# Patient Record
Sex: MALE | Race: WHITE | NOT HISPANIC OR LATINO | Employment: FULL TIME | URBAN - METROPOLITAN AREA
[De-identification: names, ages, dates, MRNs, and addresses within clinical notes are randomized per-mention and may not be internally consistent; named-entity substitution may affect disease eponyms.]

---

## 2018-09-12 ENCOUNTER — HOSPITAL ENCOUNTER (OUTPATIENT)
Dept: ORTHOPEDIC SURGERY | Facility: CLINIC | Age: 43
Discharge: HOME | End: 2018-09-12
Attending: ORTHOPAEDIC SURGERY | Admitting: ORTHOPAEDIC SURGERY

## 2019-02-12 ENCOUNTER — HOSPITAL ENCOUNTER (OUTPATIENT)
Dept: PREADMISSION TESTING | Facility: HOSPITAL | Age: 44
Discharge: HOME | End: 2019-02-12
Attending: ORTHOPAEDIC SURGERY | Admitting: ORTHOPAEDIC SURGERY

## 2019-02-12 LAB
ANION GAP SERPL CALC-SCNC: 13.4 MMOL/L (ref 10–20)
APTT BLD: 27.4 SEC (ref 24–31)
BACTERIA SPEC AEROBE CULT: NORMAL
BASOPHILS # BLD AUTO: 0 10*3/UL (ref 0–0.2)
BASOPHILS NFR BLD AUTO: 1 % (ref 0–2)
BUN SERPL-MCNC: 7 MG/DL (ref 8–20)
BUN/CREAT SERPL: 8.8 (ref 6.2–20.3)
CALCIUM SERPL-MCNC: 9.3 MG/DL (ref 8.9–10.3)
CHLORIDE SERPL-SCNC: 99 MMOL/L (ref 101–111)
CONV CO2: 27 MMOL/L (ref 22–32)
CREAT UR-MCNC: 0.8 MG/DL (ref 0.7–1.2)
DIFFERENTIAL METHOD BLD: (no result)
EOSINOPHIL # BLD AUTO: 0.1 10*3/UL (ref 0–0.3)
EOSINOPHIL # BLD AUTO: 1 % (ref 0–3)
ERYTHROCYTE [DISTWIDTH] IN BLOOD BY AUTOMATED COUNT: 13.6 % (ref 11.5–14.5)
GLUCOSE SERPL-MCNC: 97 MG/DL (ref 65–99)
HCT VFR BLD AUTO: 49.2 % (ref 40–54)
HGB BLD-MCNC: 16.7 G/DL (ref 14–18)
INR PPP: 1.1
LYMPHOCYTES # BLD AUTO: 1.4 10*3/UL (ref 0.8–4.8)
LYMPHOCYTES NFR BLD AUTO: 22 % (ref 18–42)
Lab: NORMAL
MCH RBC QN AUTO: 28.6 PG (ref 26–32)
MCHC RBC AUTO-ENTMCNC: 33.8 G/DL (ref 32–36)
MCV RBC AUTO: 84.5 FL (ref 80–94)
MICRO REPORT STATUS: NORMAL
MONOCYTES # BLD AUTO: 0.6 10*3/UL (ref 0.1–1.3)
MONOCYTES NFR BLD AUTO: 9 % (ref 2–11)
NEUTROPHILS # BLD AUTO: 4.3 10*3/UL (ref 2.3–8.6)
NEUTROPHILS NFR BLD AUTO: 67 % (ref 50–75)
NRBC BLD AUTO-RTO: 0 /100{WBCS}
NRBC/RBC NFR BLD MANUAL: 0 10*3/UL
PLATELET # BLD AUTO: 246 10*3/UL (ref 150–450)
PMV BLD AUTO: 7.7 FL (ref 7.4–10.4)
POTASSIUM SERPL-SCNC: 4.4 MMOL/L (ref 3.6–5.1)
PROTHROMBIN TIME: 10.9 SEC (ref 9.6–11.7)
RBC # BLD AUTO: 5.83 10*6/UL (ref 4.6–6)
SODIUM SERPL-SCNC: 135 MMOL/L (ref 136–144)
SPECIMEN SOURCE: NORMAL
WBC # BLD AUTO: 6.4 10*3/UL (ref 4.5–11.5)

## 2019-02-22 ENCOUNTER — HOSPITAL ENCOUNTER (OUTPATIENT)
Dept: PREOP | Facility: HOSPITAL | Age: 44
Setting detail: HOSPITAL OUTPATIENT SURGERY
Discharge: HOME | End: 2019-02-22
Attending: ORTHOPAEDIC SURGERY | Admitting: ORTHOPAEDIC SURGERY

## 2019-06-07 ENCOUNTER — HOSPITAL ENCOUNTER (OUTPATIENT)
Dept: MRI IMAGING | Facility: HOSPITAL | Age: 44
Discharge: HOME | End: 2019-06-07
Attending: PSYCHIATRY & NEUROLOGY | Admitting: PSYCHIATRY & NEUROLOGY

## 2019-06-08 ENCOUNTER — HOSPITAL ENCOUNTER (OUTPATIENT)
Dept: OTHER | Facility: HOSPITAL | Age: 44
Setting detail: RECURRING SERIES
Discharge: HOME | End: 2019-06-10
Attending: PSYCHIATRY & NEUROLOGY | Admitting: PSYCHIATRY & NEUROLOGY

## 2019-07-15 ENCOUNTER — TRANSCRIBE ORDERS (OUTPATIENT)
Dept: ADMINISTRATIVE | Facility: HOSPITAL | Age: 44
End: 2019-07-15

## 2019-07-15 DIAGNOSIS — G35 MS (MULTIPLE SCLEROSIS) (HCC): Primary | ICD-10-CM

## 2019-07-23 ENCOUNTER — APPOINTMENT (OUTPATIENT)
Dept: MRI IMAGING | Facility: HOSPITAL | Age: 44
End: 2019-07-23

## 2019-12-16 ENCOUNTER — TRANSCRIBE ORDERS (OUTPATIENT)
Dept: ADMINISTRATIVE | Facility: HOSPITAL | Age: 44
End: 2019-12-16

## 2019-12-16 DIAGNOSIS — G35 MULTIPLE SCLEROSIS (HCC): Primary | ICD-10-CM

## 2019-12-18 ENCOUNTER — HOSPITAL ENCOUNTER (OUTPATIENT)
Dept: MRI IMAGING | Facility: HOSPITAL | Age: 44
Discharge: HOME | End: 2019-12-18
Admitting: NURSE PRACTITIONER

## 2019-12-18 DIAGNOSIS — G35 MULTIPLE SCLEROSIS (HCC): ICD-10-CM

## 2019-12-18 LAB — CREAT BLDA-MCNC: 0.9 MG/DL (ref 0.6–1.3)

## 2019-12-18 PROCEDURE — 72156 MRI NECK SPINE W/O & W/DYE: CPT

## 2019-12-18 PROCEDURE — 82565 ASSAY OF CREATININE: CPT

## 2019-12-18 PROCEDURE — 25010000002 GADOTERIDOL PER 1 ML: Performed by: NURSE PRACTITIONER

## 2019-12-18 PROCEDURE — A9576 INJ PROHANCE MULTIPACK: HCPCS | Performed by: NURSE PRACTITIONER

## 2019-12-18 RX ADMIN — GADOTERIDOL 20 ML: 279.3 INJECTION, SOLUTION INTRAVENOUS at 16:11

## 2019-12-19 ENCOUNTER — TRANSCRIBE ORDERS (OUTPATIENT)
Dept: ADMINISTRATIVE | Facility: HOSPITAL | Age: 44
End: 2019-12-19

## 2019-12-19 DIAGNOSIS — G35 MULTIPLE SCLEROSIS (HCC): Primary | ICD-10-CM

## 2020-01-06 ENCOUNTER — TRANSCRIBE ORDERS (OUTPATIENT)
Dept: ADMINISTRATIVE | Facility: HOSPITAL | Age: 45
End: 2020-01-06

## 2020-01-06 DIAGNOSIS — Q61.3 POLYCYSTIC KIDNEY DISEASE: Primary | ICD-10-CM

## 2020-01-10 ENCOUNTER — OFFICE (OUTPATIENT)
Dept: URBAN - METROPOLITAN AREA CLINIC 64 | Facility: CLINIC | Age: 45
End: 2020-01-10
Payer: COMMERCIAL

## 2020-01-10 VITALS
HEIGHT: 71 IN | HEART RATE: 84 BPM | DIASTOLIC BLOOD PRESSURE: 83 MMHG | SYSTOLIC BLOOD PRESSURE: 137 MMHG | WEIGHT: 223 LBS

## 2020-01-10 DIAGNOSIS — R10.32 LEFT LOWER QUADRANT PAIN: ICD-10-CM

## 2020-01-10 DIAGNOSIS — G35 MULTIPLE SCLEROSIS: ICD-10-CM

## 2020-01-10 DIAGNOSIS — N53.12 PAINFUL EJACULATION: ICD-10-CM

## 2020-01-10 PROCEDURE — 99202 OFFICE O/P NEW SF 15 MIN: CPT | Performed by: NURSE PRACTITIONER

## 2020-01-13 ENCOUNTER — APPOINTMENT (OUTPATIENT)
Dept: ULTRASOUND IMAGING | Facility: HOSPITAL | Age: 45
End: 2020-01-13

## 2020-03-09 ENCOUNTER — RESULTS ENCOUNTER (OUTPATIENT)
Dept: PAIN MEDICINE | Facility: CLINIC | Age: 45
End: 2020-03-09

## 2020-03-09 ENCOUNTER — OFFICE VISIT (OUTPATIENT)
Dept: PAIN MEDICINE | Facility: CLINIC | Age: 45
End: 2020-03-09

## 2020-03-09 VITALS
DIASTOLIC BLOOD PRESSURE: 87 MMHG | HEART RATE: 71 BPM | HEIGHT: 71 IN | SYSTOLIC BLOOD PRESSURE: 137 MMHG | RESPIRATION RATE: 16 BRPM | OXYGEN SATURATION: 99 % | TEMPERATURE: 97.6 F | WEIGHT: 200 LBS | BODY MASS INDEX: 28 KG/M2

## 2020-03-09 DIAGNOSIS — G89.4 CHRONIC PAIN SYNDROME: ICD-10-CM

## 2020-03-09 DIAGNOSIS — G35 MULTIPLE SCLEROSIS (HCC): ICD-10-CM

## 2020-03-09 DIAGNOSIS — F19.90 CURRENT DRUG USE: Primary | ICD-10-CM

## 2020-03-09 DIAGNOSIS — F19.90 CURRENT DRUG USE: ICD-10-CM

## 2020-03-09 DIAGNOSIS — Z79.899 HIGH RISK MEDICATION USE: ICD-10-CM

## 2020-03-09 DIAGNOSIS — M79.2 NEUROPATHIC PAIN: ICD-10-CM

## 2020-03-09 DIAGNOSIS — M79.18 MYOFASCIAL PAIN SYNDROME: ICD-10-CM

## 2020-03-09 PROCEDURE — G0463 HOSPITAL OUTPT CLINIC VISIT: HCPCS | Performed by: ANESTHESIOLOGY

## 2020-03-09 PROCEDURE — 99204 OFFICE O/P NEW MOD 45 MIN: CPT | Performed by: ANESTHESIOLOGY

## 2020-03-09 RX ORDER — HYDROCODONE BITARTRATE AND ACETAMINOPHEN 7.5; 325 MG/1; MG/1
1 TABLET ORAL 2 TIMES DAILY PRN
Qty: 14 TABLET | Refills: 0 | Status: SHIPPED | OUTPATIENT
Start: 2020-03-09 | End: 2020-03-09 | Stop reason: SDUPTHER

## 2020-03-09 RX ORDER — PREGABALIN 75 MG/1
75 CAPSULE ORAL DAILY
COMMUNITY
End: 2021-06-17

## 2020-03-09 RX ORDER — HYDROCODONE BITARTRATE AND ACETAMINOPHEN 7.5; 325 MG/1; MG/1
1 TABLET ORAL 2 TIMES DAILY PRN
Qty: 46 TABLET | Refills: 0 | Status: SHIPPED | OUTPATIENT
Start: 2020-03-09 | End: 2020-04-08 | Stop reason: SDUPTHER

## 2020-03-09 RX ORDER — HYDROCODONE BITARTRATE AND ACETAMINOPHEN 7.5; 325 MG/1; MG/1
1 TABLET ORAL EVERY 8 HOURS PRN
COMMUNITY
End: 2020-03-09 | Stop reason: SDUPTHER

## 2020-03-09 RX ORDER — LISINOPRIL AND HYDROCHLOROTHIAZIDE 12.5; 1 MG/1; MG/1
1 TABLET ORAL DAILY
COMMUNITY
Start: 2020-01-17

## 2020-03-09 RX ORDER — GLATIRAMER 40 MG/ML
40 INJECTION, SOLUTION SUBCUTANEOUS 3 TIMES WEEKLY
COMMUNITY
Start: 2019-06-24 | End: 2021-06-17

## 2020-03-09 NOTE — PROGRESS NOTES
Subjective    CC bilateral feet and leg pain.  Andrea Dumont is a 44 y.o. male with multiple sclerosis, peripheral neuropathy here for evaluation.  Referred by PCP  Complains of continued and worsening bilateral feet and lower leg neuropathic pain and myofascial pain constant but worse with activity.  Denies new weakness saddle anesthesia bladder bowel continence.  Chronic axial back pain without radicular pain.  Chronic polyarthralgia.  Sees neurology for MS  Pain interfering with daily activity work and sleep.  Tried gabapentin did not tolerate.  Currently utilizes Lyrica but can only tolerate once a day 75 mg.  Prescriber PCP.  Also utilizes hydrocodone as needed with good relief and functional benefit.  Cannot take NSAIDs due to polycystic kidney disease    T-spine MRI 2019 suspected demyelinating plaque centered at about the T8-9 level.  No other definite demyelinating plaques are seen.  L-spine and C-spine MRI 2019 no cord abnormality, minimal degenerative disc disease without canal stenosis or significant foraminal narrowing.    Pain Assessment   Location of Pain:  ana Leg, joint, back  Description of Pain: Dull/Aching, Throbbing, Stabbing  Previous Pain Rating :4   Current Pain Ratin  Aggravating Factors: Activity  Alleviating Factors: Rest, Medication    PEG Assessment   What number best describes your pain on average in the past week?4  What number best describes how, during the past week, pain has interfered with your enjoyment of life?4  What number best describes how, during the past week, pain has interfered with your general activity?10      The following portions of the patient's history were reviewed and updated as appropriate: allergies, current medications, past family history, past medical history, past social history, past surgical history and problem list.     has a past medical history of Arthritis, High blood pressure, Knee pain, left, Leg pain, Multiple sclerosis (CMS/HCC), Pain in  both feet, and Polycystic kidney disease.   has a past surgical history that includes Vasectomy; Cyst Removal; and Shoulder surgery (Left, 02/22/2018).  family history includes Cancer in an other family member; Diabetes in an other family member; Hyperlipidemia in his father; Transient ischemic attack in his father.  Social History     Tobacco Use   • Smoking status: Former Smoker     Last attempt to quit: 2010     Years since quitting: 10.1   • Smokeless tobacco: Never Used   Substance Use Topics   • Alcohol use: Yes     Frequency: Never     Comment: occ       Review of Systems   Constitutional: Negative for chills, diaphoresis, fatigue and fever.   HENT: Negative for swollen glands and trouble swallowing.    Respiratory: Negative.  Negative for shortness of breath.    Cardiovascular: Negative for chest pain, palpitations and leg swelling.   Gastrointestinal: Negative for abdominal pain, nausea and vomiting.   Genitourinary: Negative for urinary incontinence.   Musculoskeletal: Positive for back pain and neck pain. Negative for arthralgias, gait problem, joint swelling, myalgias and neck stiffness.        Ronan leg pain   Skin: Negative for color change, dry skin, rash and skin lesions.   Neurological: Positive for weakness. Negative for dizziness, syncope, light-headedness, headache and confusion.   Hematological: Negative for adenopathy. Does not bruise/bleed easily.   Psychiatric/Behavioral: Negative for behavioral problems, suicidal ideas and depressed mood.   All other systems reviewed and are negative.    Objective   Physical Exam   Constitutional: He is oriented to person, place, and time. He appears well-developed and well-nourished. No distress.   Eyes: Pupils are equal, round, and reactive to light. Conjunctivae are normal.   Neck: Normal range of motion and full passive range of motion without pain. No Kernig's sign noted.   Cardiovascular: Normal heart sounds and intact distal pulses.   Pulmonary/Chest:  "Effort normal and breath sounds normal.   Musculoskeletal:        Lumbar back: He exhibits tenderness.   Back: Paraspinal tenderness, negative leg raise.  Pain with extension/side rotation.   Lumbar loading positive, pain on extension of low back past 5 degrees.  TTP on the lumbar facets noted     Vascular Status -  His right foot exhibits no edema. His left foot exhibits no edema.  Lymphadenopathy:     He has no cervical adenopathy.   Neurological: He is alert and oriented to person, place, and time. He has normal strength and normal reflexes. A sensory deficit is present. Coordination and gait normal.   Reflex Scores:       Bicep reflexes are 2+ on the right side and 2+ on the left side.       Brachioradialis reflexes are 2+ on the right side and 2+ on the left side.       Patellar reflexes are 2+ on the right side and 2+ on the left side.       Achilles reflexes are 2+ on the right side and 2+ on the left side.  Skin: Skin is warm and dry. Capillary refill takes less than 2 seconds.   Psychiatric: He has a normal mood and affect. His behavior is normal.   Vitals reviewed.    /87   Pulse 71   Temp 97.6 °F (36.4 °C)   Resp 16   Ht 180.3 cm (71\")   Wt 90.7 kg (200 lb)   SpO2 99%   BMI 27.89 kg/m²     PHQ 9 on chart  Opioid risk tool moderate risk (age)    Assessment/Plan   Andrea was seen today for foot pain, leg pain and initial evaluation.    Diagnoses and all orders for this visit:    Current drug use  -     Urine Drug Screen - Urine, Clean Catch; Future    Chronic pain syndrome  -     Discontinue: HYDROcodone-acetaminophen (NORCO) 7.5-325 MG per tablet; Take 1 tablet by mouth 2 (Two) Times a Day As Needed for Severe Pain  (takes prn).  -     HYDROcodone-acetaminophen (NORCO) 7.5-325 MG per tablet; Take 1 tablet by mouth 2 (Two) Times a Day As Needed for Severe Pain  (takes prn).    Neuropathic pain  -     Discontinue: HYDROcodone-acetaminophen (NORCO) 7.5-325 MG per tablet; Take 1 tablet by " mouth 2 (Two) Times a Day As Needed for Severe Pain  (takes prn).  -     HYDROcodone-acetaminophen (NORCO) 7.5-325 MG per tablet; Take 1 tablet by mouth 2 (Two) Times a Day As Needed for Severe Pain  (takes prn).    Multiple sclerosis (CMS/HCC)  -     Discontinue: HYDROcodone-acetaminophen (NORCO) 7.5-325 MG per tablet; Take 1 tablet by mouth 2 (Two) Times a Day As Needed for Severe Pain  (takes prn).  -     HYDROcodone-acetaminophen (NORCO) 7.5-325 MG per tablet; Take 1 tablet by mouth 2 (Two) Times a Day As Needed for Severe Pain  (takes prn).    Myofascial pain syndrome    High risk medication use    Summary  Andrea Dumont is a 44 y.o. male with multiple sclerosis, peripheral neuropathy here for evaluation.  Referred by PCP  Continued worsening bilateral lower extremity neuropathic pain/myofascial pain related to MS.  Fitting with ADL and work.  Cannot take NSAIDs due to polycystic kidney disease.  Good relief with hydrocodone as needed and Lyrica once daily.    We will continue hydrocodone 7.5/325 twice daily as needed for severe pain.  UDS sent.  Inspect reviewed.  Controlled substance agreement signed.  Discussed risk of tolerance, dependence, respiratory depression, coma and death associated with use of oral opioids for treatment of chronic nonmalignant pain.     Keep follow-up with neurology.    RTC 1 month

## 2020-04-02 ENCOUNTER — TELEPHONE (OUTPATIENT)
Dept: PAIN MEDICINE | Facility: CLINIC | Age: 45
End: 2020-04-02

## 2020-04-02 NOTE — TELEPHONE ENCOUNTER
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 He will be out of medication if we reschedule after the quarantine

## 2020-04-08 ENCOUNTER — TELEMEDICINE (OUTPATIENT)
Dept: PAIN MEDICINE | Facility: CLINIC | Age: 45
End: 2020-04-08

## 2020-04-08 DIAGNOSIS — G35 MULTIPLE SCLEROSIS (HCC): ICD-10-CM

## 2020-04-08 DIAGNOSIS — M79.18 MYOFASCIAL PAIN SYNDROME: Primary | ICD-10-CM

## 2020-04-08 DIAGNOSIS — Z79.899 HIGH RISK MEDICATION USE: ICD-10-CM

## 2020-04-08 DIAGNOSIS — M79.2 NEUROPATHIC PAIN: ICD-10-CM

## 2020-04-08 DIAGNOSIS — G89.4 CHRONIC PAIN SYNDROME: ICD-10-CM

## 2020-04-08 PROCEDURE — 99214 OFFICE O/P EST MOD 30 MIN: CPT | Performed by: ANESTHESIOLOGY

## 2020-04-08 RX ORDER — HYDROCODONE BITARTRATE AND ACETAMINOPHEN 7.5; 325 MG/1; MG/1
1 TABLET ORAL 2 TIMES DAILY PRN
Qty: 60 TABLET | Refills: 0 | Status: SHIPPED | OUTPATIENT
Start: 2020-04-08 | End: 2020-05-05 | Stop reason: SDUPTHER

## 2020-04-08 NOTE — PROGRESS NOTES
Subjective    CC bilateral feet and leg pain.  Andrea Dumont is a 45 y.o. male with multiple sclerosis, peripheral neuropathy here for follow up via Video to mitigate coronavirus exposure/epidemic.  Continued Hydrocodone last visit and increased to BID with good relief. Chronic bilateral feet and lower leg neuropathic pain and myofascial pain constant but worse with activity.  Denies new weakness saddle anesthesia bladder bowel continence.  Chronic axial back pain without radicular pain.  Chronic polyarthralgia.  Sees neurology for MS  Pain interfering with daily activity work and sleep.  Tried gabapentin did not tolerate.  Currently utilizes Lyrica but can only tolerate once a day 75 mg.  Prescribed by PCP.  Cannot take NSAIDs due to polycystic kidney disease    T-spine MRI 2019 suspected demyelinating plaque centered at about the T8-9 level.  No other definite demyelinating plaques are seen.  L-spine and C-spine MRI 2019 no cord abnormality, minimal degenerative disc disease without canal stenosis or significant foraminal narrowing.    Pain Assessment   Location of Pain:  ana Leg, joint, back  Description of Pain: Dull/Aching, Throbbing, Stabbing  Previous Pain Rating :4   Current Pain Rating: 3  Aggravating Factors: Activity  Alleviating Factors: Rest, Medication    PEG Assessment   What number best describes your pain on average in the past week?5  What number best describes how, during the past week, pain has interfered with your enjoyment of life?4  What number best describes how, during the past week, pain has interfered with your general activity?7     The following portions of the patient's history were reviewed and updated as appropriate: allergies, current medications, past family history, past medical history, past social history, past surgical history and problem list.     has a past medical history of Arthritis, High blood pressure, Knee pain, left, Leg pain, Multiple sclerosis (CMS/HCC), Pain in  both feet, and Polycystic kidney disease.   has a past surgical history that includes Vasectomy; Cyst Removal; and Shoulder surgery (Left, 02/22/2018).  family history includes Cancer in an other family member; Diabetes in an other family member; Hyperlipidemia in his father; Transient ischemic attack in his father.  Social History     Tobacco Use   • Smoking status: Former Smoker     Last attempt to quit: 2010     Years since quitting: 10.2   • Smokeless tobacco: Never Used   Substance Use Topics   • Alcohol use: Yes     Frequency: Never     Comment: occ       Review of Systems   Constitutional: Negative for chills, diaphoresis, fatigue and fever.   HENT: Negative for swollen glands and trouble swallowing.    Respiratory: Negative.  Negative for shortness of breath.    Cardiovascular: Negative for chest pain, palpitations and leg swelling.   Gastrointestinal: Negative for abdominal pain, nausea and vomiting.   Genitourinary: Negative for urinary incontinence.   Musculoskeletal: Positive for back pain and neck pain. Negative for arthralgias, gait problem, joint swelling, myalgias and neck stiffness.        Ronan leg pain   Skin: Negative for color change, dry skin, rash and skin lesions.   Neurological: Positive for weakness. Negative for dizziness, syncope, light-headedness, headache and confusion.   Hematological: Negative for adenopathy. Does not bruise/bleed easily.   Psychiatric/Behavioral: Negative for behavioral problems, suicidal ideas and depressed mood.   All other systems reviewed and are negative.    Objective   Physical Exam   Constitutional: He is oriented to person, place, and time. No distress.   Pulmonary/Chest: Effort normal.   Neurological: He is alert and oriented to person, place, and time.   Psychiatric: He has a normal mood and affect. His behavior is normal.     PHQ 9 on chart  Opioid risk tool moderate risk (age)    Assessment/Plan   Andrea was seen today for neuralgia, generalized body  aches, extremity pain and follow-up.    Diagnoses and all orders for this visit:    Myofascial pain syndrome    Chronic pain syndrome  -     HYDROcodone-acetaminophen (NORCO) 7.5-325 MG per tablet; Take 1 tablet by mouth 2 (Two) Times a Day As Needed for Severe Pain  (takes prn).    Neuropathic pain  -     HYDROcodone-acetaminophen (NORCO) 7.5-325 MG per tablet; Take 1 tablet by mouth 2 (Two) Times a Day As Needed for Severe Pain  (takes prn).    Multiple sclerosis (CMS/HCC)  -     HYDROcodone-acetaminophen (NORCO) 7.5-325 MG per tablet; Take 1 tablet by mouth 2 (Two) Times a Day As Needed for Severe Pain  (takes prn).    High risk medication use    Summary  Andrea Dumont is a 44 y.o. male with multiple sclerosis, peripheral neuropathy here for f/u by Video.   Continued  bilateral lower extremity neuropathic pain/myofascial pain related to MS.  Fitting with ADL and work.  Cannot take NSAIDs due to polycystic kidney disease.    We will continue hydrocodone 7.5/325 twice daily as needed for severe pain.  UDS and Inspect reviewed.   Discussed risk of tolerance, dependence, respiratory depression, coma and death associated with use of oral opioids for treatment of chronic nonmalignant pain.     Keep follow-up with neurology.    Discussed CDC guidelines/precaution regarding coroanvirus.     RTC 1 month

## 2020-05-05 ENCOUNTER — TELEMEDICINE (OUTPATIENT)
Dept: PAIN MEDICINE | Facility: CLINIC | Age: 45
End: 2020-05-05

## 2020-05-05 DIAGNOSIS — G89.4 CHRONIC PAIN SYNDROME: Primary | ICD-10-CM

## 2020-05-05 DIAGNOSIS — M79.18 MYOFASCIAL PAIN SYNDROME: ICD-10-CM

## 2020-05-05 DIAGNOSIS — G35 MULTIPLE SCLEROSIS (HCC): ICD-10-CM

## 2020-05-05 DIAGNOSIS — M79.2 NEUROPATHIC PAIN: ICD-10-CM

## 2020-05-05 PROCEDURE — 99214 OFFICE O/P EST MOD 30 MIN: CPT | Performed by: ANESTHESIOLOGY

## 2020-05-05 RX ORDER — HYDROCODONE BITARTRATE AND ACETAMINOPHEN 7.5; 325 MG/1; MG/1
1 TABLET ORAL 2 TIMES DAILY PRN
Qty: 60 TABLET | Refills: 0 | Status: SHIPPED | OUTPATIENT
Start: 2020-05-05 | End: 2020-06-04 | Stop reason: SDUPTHER

## 2020-05-05 NOTE — PROGRESS NOTES
Subjective    CC bilateral feet and leg pain.  Andrea Dumont is a 45 y.o. male with multiple sclerosis, peripheral neuropathy here for follow up via Video.  Continued chronic bilateral feet and lower leg neuropathic pain and myofascial pain constant but worse with activity.  Denies new weakness saddle anesthesia bladder bowel continence.  Chronic axial back pain without radicular pain.  Chronic polyarthralgia.  Sees neurology for MS  Pain interfering with daily activity work and sleep.  Tried gabapentin did not tolerate.  Currently utilizes Lyrica but can only tolerate once a day 75 mg.  Prescribed by PCP.  Cannot take NSAIDs due to polycystic kidney disease    T-spine MRI 2019 suspected demyelinating plaque centered at about the T8-9 level.  No other definite demyelinating plaques are seen.  L-spine and C-spine MRI 2019 no cord abnormality, minimal degenerative disc disease without canal stenosis or significant foraminal narrowing.    Pain Assessment   Location of Pain:  ana Leg, joint, back  Description of Pain: Dull/Aching, Throbbing, Stabbing  Previous Pain Rating :3   Current Pain Ratin  Aggravating Factors: Activity  Alleviating Factors: Rest, Medication    PEG Assessment   What number best describes your pain on average in the past week?4  What number best describes how, during the past week, pain has interfered with your enjoyment of life?4  What number best describes how, during the past week, pain has interfered with your general activity?7     The following portions of the patient's history were reviewed and updated as appropriate: allergies, current medications, past family history, past medical history, past social history, past surgical history and problem list.     has a past medical history of Arthritis, High blood pressure, Knee pain, left, Leg pain, Multiple sclerosis (CMS/HCC), Pain in both feet, and Polycystic kidney disease.   has a past surgical history that includes Vasectomy; Cyst  Removal; and Shoulder surgery (Left, 02/22/2018).  family history includes Cancer in an other family member; Diabetes in an other family member; Hyperlipidemia in his father; Transient ischemic attack in his father.  Social History     Tobacco Use   • Smoking status: Former Smoker     Last attempt to quit: 2010     Years since quitting: 10.3   • Smokeless tobacco: Never Used   Substance Use Topics   • Alcohol use: Yes     Frequency: Never     Comment: occ       Review of Systems   Constitutional: Negative for chills, diaphoresis, fatigue and fever.   HENT: Negative for swollen glands and trouble swallowing.    Respiratory: Negative.  Negative for shortness of breath.    Cardiovascular: Negative for chest pain, palpitations and leg swelling.   Gastrointestinal: Negative for abdominal pain, nausea and vomiting.   Genitourinary: Negative for urinary incontinence.   Musculoskeletal: Positive for back pain and neck pain. Negative for arthralgias, gait problem, joint swelling, myalgias and neck stiffness.        Ronan leg pain   Skin: Negative for color change, dry skin, rash and skin lesions.   Neurological: Positive for weakness. Negative for dizziness, syncope, light-headedness, headache and confusion.   Hematological: Negative for adenopathy. Does not bruise/bleed easily.   Psychiatric/Behavioral: Negative for behavioral problems, suicidal ideas and depressed mood.   All other systems reviewed and are negative.    Objective   Physical Exam   Constitutional: He is oriented to person, place, and time. No distress.   Pulmonary/Chest: Effort normal.   Neurological: He is alert and oriented to person, place, and time.   Psychiatric: He has a normal mood and affect. His behavior is normal.     PHQ 9 on chart  Opioid risk tool moderate risk (age)    Assessment/Plan   Andrea was seen today for extremity pain, generalized body aches and follow-up.    Diagnoses and all orders for this visit:    Chronic pain syndrome  -      HYDROcodone-acetaminophen (NORCO) 7.5-325 MG per tablet; Take 1 tablet by mouth 2 (Two) Times a Day As Needed for Severe Pain  (takes prn).    Neuropathic pain  -     HYDROcodone-acetaminophen (NORCO) 7.5-325 MG per tablet; Take 1 tablet by mouth 2 (Two) Times a Day As Needed for Severe Pain  (takes prn).    Myofascial pain syndrome    Multiple sclerosis (CMS/HCC)  -     HYDROcodone-acetaminophen (NORCO) 7.5-325 MG per tablet; Take 1 tablet by mouth 2 (Two) Times a Day As Needed for Severe Pain  (takes prn).    Summary  Andrea Dumont is a 45 y.o. male with multiple sclerosis, peripheral neuropathy here for f/u by Video Visit.   Chronic bilateral lower extremity neuropathic pain/myofascial pain related to MS.  Fitting with ADL and work.  Cannot take NSAIDs due to polycystic kidney disease.    We will continue hydrocodone 7.5/325 twice daily as needed for severe pain.  UDS and Inspect reviewed.   Discussed risk of tolerance, dependence, respiratory depression, coma and death associated with use of oral opioids for treatment of chronic nonmalignant pain.     Keep follow-up with neurology.    Videovisit done to avoid coronavirus exposure.  Discussed CDC guidelines and precaution regarding current epidemic.    RTC 1 month

## 2020-06-03 ENCOUNTER — APPOINTMENT (OUTPATIENT)
Dept: PAIN MEDICINE | Facility: CLINIC | Age: 45
End: 2020-06-03

## 2020-06-04 ENCOUNTER — TELEMEDICINE (OUTPATIENT)
Dept: PAIN MEDICINE | Facility: CLINIC | Age: 45
End: 2020-06-04

## 2020-06-04 ENCOUNTER — TELEPHONE (OUTPATIENT)
Dept: PAIN MEDICINE | Facility: HOSPITAL | Age: 45
End: 2020-06-04

## 2020-06-04 DIAGNOSIS — M79.2 NEUROPATHIC PAIN: ICD-10-CM

## 2020-06-04 DIAGNOSIS — G35 MULTIPLE SCLEROSIS (HCC): ICD-10-CM

## 2020-06-04 DIAGNOSIS — Z79.899 HIGH RISK MEDICATION USE: ICD-10-CM

## 2020-06-04 DIAGNOSIS — G89.4 CHRONIC PAIN SYNDROME: Primary | ICD-10-CM

## 2020-06-04 PROCEDURE — 99214 OFFICE O/P EST MOD 30 MIN: CPT | Performed by: ANESTHESIOLOGY

## 2020-06-04 RX ORDER — HYDROCODONE BITARTRATE AND ACETAMINOPHEN 7.5; 325 MG/1; MG/1
1 TABLET ORAL 2 TIMES DAILY PRN
Qty: 60 TABLET | Refills: 0 | Status: SHIPPED | OUTPATIENT
Start: 2020-06-05 | End: 2020-07-31 | Stop reason: SDUPTHER

## 2020-06-04 RX ORDER — HYDROCODONE BITARTRATE AND ACETAMINOPHEN 7.5; 325 MG/1; MG/1
1 TABLET ORAL 2 TIMES DAILY PRN
Qty: 60 TABLET | Refills: 0 | Status: SHIPPED | OUTPATIENT
Start: 2020-07-05 | End: 2020-07-31 | Stop reason: SDUPTHER

## 2020-06-04 NOTE — PROGRESS NOTES
Subjective    CC bilateral feet and leg pain.  Andrea Dumont is a 45 y.o. male with multiple sclerosis, peripheral neuropathy here for follow up via Video.  Reports worsening myofascial pain this week, but he has also increased activity and hours at work.   Chronic bilateral feet and lower leg neuropathic pain and myofascial pain constant but worse with activity.  Denies new weakness saddle anesthesia bladder bowel continence.  Chronic axial back pain without radicular pain.  Chronic polyarthralgia.  Sees neurology for MS  Pain interfering with daily activity work and sleep.  Tried gabapentin did not tolerate.  Currently utilizes Lyrica but can only tolerate once a day 75 mg.  Prescribed by PCP.  Cannot take NSAIDs due to polycystic kidney disease    T-spine MRI 2019 suspected demyelinating plaque centered at about the T8-9 level.  No other definite demyelinating plaques are seen.  L-spine and C-spine MRI 2019 no cord abnormality, minimal degenerative disc disease without canal stenosis or significant foraminal narrowing.    Pain Assessment   Location of Pain:  ana Leg, joint, back  Description of Pain: Dull/Aching, Throbbing, Stabbing  Previous Pain Rating :5   Current Pain Ratin  Aggravating Factors: Activity  Alleviating Factors: Rest, Medication    PEG Assessment   What number best describes your pain on average in the past week?8  What number best describes how, during the past week, pain has interfered with your enjoyment of life?4  What number best describes how, during the past week, pain has interfered with your general activity?8     The following portions of the patient's history were reviewed and updated as appropriate: allergies, current medications, past family history, past medical history, past social history, past surgical history and problem list.     has a past medical history of Arthritis, High blood pressure, Knee pain, left, Leg pain, Multiple sclerosis (CMS/HCC), Pain in both feet,  and Polycystic kidney disease.   has a past surgical history that includes Vasectomy; Cyst Removal; and Shoulder surgery (Left, 02/22/2018).  family history includes Cancer in an other family member; Diabetes in an other family member; Hyperlipidemia in his father; Transient ischemic attack in his father.  Social History     Tobacco Use   • Smoking status: Former Smoker     Last attempt to quit: 2010     Years since quitting: 10.4   • Smokeless tobacco: Never Used   Substance Use Topics   • Alcohol use: Yes     Frequency: Never     Comment: occ       Review of Systems   Constitutional: Negative for chills, diaphoresis, fatigue and fever.   HENT: Negative for swollen glands and trouble swallowing.    Respiratory: Negative.  Negative for shortness of breath.    Cardiovascular: Negative for chest pain, palpitations and leg swelling.   Gastrointestinal: Negative for abdominal pain, nausea and vomiting.   Genitourinary: Negative for urinary incontinence.   Musculoskeletal: Positive for back pain and neck pain. Negative for arthralgias, gait problem, joint swelling, myalgias and neck stiffness.        Ronan leg pain   Skin: Negative for color change, dry skin, rash and skin lesions.   Neurological: Positive for weakness. Negative for dizziness, syncope, light-headedness, headache and confusion.   Hematological: Negative for adenopathy. Does not bruise/bleed easily.   Psychiatric/Behavioral: Negative for behavioral problems, suicidal ideas and depressed mood.   All other systems reviewed and are negative.    Objective   Physical Exam   Constitutional: He is oriented to person, place, and time. No distress.   Pulmonary/Chest: Effort normal.   Neurological: He is alert and oriented to person, place, and time.   Psychiatric: He has a normal mood and affect. His behavior is normal.     PHQ 9 on chart  Opioid risk tool moderate risk (age)    Assessment/Plan    Andrea was seen today for generalized body aches and  follow-up.    Diagnoses and all orders for this visit:    Chronic pain syndrome  -     HYDROcodone-acetaminophen (NORCO) 7.5-325 MG per tablet; Take 1 tablet by mouth 2 (Two) Times a Day As Needed for Severe Pain . DNF before 7/5/2020  -     HYDROcodone-acetaminophen (NORCO) 7.5-325 MG per tablet; Take 1 tablet by mouth 2 (Two) Times a Day As Needed for Severe Pain  (takes prn).    Neuropathic pain  -     HYDROcodone-acetaminophen (NORCO) 7.5-325 MG per tablet; Take 1 tablet by mouth 2 (Two) Times a Day As Needed for Severe Pain  (takes prn).    Multiple sclerosis (CMS/HCC)  -     HYDROcodone-acetaminophen (NORCO) 7.5-325 MG per tablet; Take 1 tablet by mouth 2 (Two) Times a Day As Needed for Severe Pain  (takes prn).    High risk medication use    Summary  Andrea Dumont is a 45 y.o. male with multiple sclerosis, peripheral neuropathy here for f/u by Video Visit.   Chronic bilateral lower extremity neuropathic pain/myofascial pain related to MS.  Fitting with ADL and work.  Cannot take NSAIDs due to polycystic kidney disease.    New UDS tomorrow  Continue hydrocodone 7.5/325 twice daily as needed for severe pain.  UDS and Inspect reviewed.   Discussed risk of tolerance, dependence, respiratory depression, coma and death associated with use of oral opioids for treatment of chronic nonmalignant pain.     Keep follow-up with neurology.    Videovisit done to avoid coronavirus exposure.  Discussed CDC guidelines and precaution regarding current epidemic.    RTC 2  month

## 2020-06-05 ENCOUNTER — LAB (OUTPATIENT)
Dept: LAB | Facility: HOSPITAL | Age: 45
End: 2020-06-05

## 2020-06-05 ENCOUNTER — RESULTS ENCOUNTER (OUTPATIENT)
Dept: PAIN MEDICINE | Facility: HOSPITAL | Age: 45
End: 2020-06-05

## 2020-06-05 DIAGNOSIS — F19.90 CURRENT DRUG USE: Primary | ICD-10-CM

## 2020-06-05 DIAGNOSIS — F19.90 CURRENT DRUG USE: ICD-10-CM

## 2020-06-05 NOTE — TELEPHONE ENCOUNTER
Called patient to let him know he needs to come in for UDS today. He stated he is on the way to work and he cant come in today, he said he can come in Monday morning he need it heads up. I explain to him about the pain management agreement. He said he will call Dr Pemberton later today.

## 2020-06-30 ENCOUNTER — TELEPHONE (OUTPATIENT)
Dept: PAIN MEDICINE | Facility: CLINIC | Age: 45
End: 2020-06-30

## 2020-07-13 ENCOUNTER — LAB (OUTPATIENT)
Dept: LAB | Facility: HOSPITAL | Age: 45
End: 2020-07-13

## 2020-07-13 NOTE — TELEPHONE ENCOUNTER
Had # 38 Norco on pill count filled 07/05/2020 verified per [facility] and JALEEL Mayfield RN. LD of [facility] 07/13/2020 0700.

## 2020-07-31 ENCOUNTER — OFFICE VISIT (OUTPATIENT)
Dept: PAIN MEDICINE | Facility: CLINIC | Age: 45
End: 2020-07-31

## 2020-07-31 DIAGNOSIS — G89.4 CHRONIC PAIN SYNDROME: Primary | ICD-10-CM

## 2020-07-31 DIAGNOSIS — G35 MULTIPLE SCLEROSIS (HCC): ICD-10-CM

## 2020-07-31 DIAGNOSIS — Z79.899 HIGH RISK MEDICATION USE: ICD-10-CM

## 2020-07-31 DIAGNOSIS — M79.2 NEUROPATHIC PAIN: ICD-10-CM

## 2020-07-31 DIAGNOSIS — M79.18 MYOFASCIAL PAIN SYNDROME: ICD-10-CM

## 2020-07-31 PROCEDURE — 99443 PR PHYS/QHP TELEPHONE EVALUATION 21-30 MIN: CPT | Performed by: ANESTHESIOLOGY

## 2020-07-31 RX ORDER — PREGABALIN 75 MG/1
75 CAPSULE ORAL AS NEEDED
COMMUNITY
Start: 2019-12-19 | End: 2021-06-17

## 2020-07-31 RX ORDER — HYDROCODONE BITARTRATE AND ACETAMINOPHEN 7.5; 325 MG/1; MG/1
1 TABLET ORAL 2 TIMES DAILY PRN
Qty: 60 TABLET | Refills: 0 | Status: SHIPPED | OUTPATIENT
Start: 2020-08-03 | End: 2020-10-01 | Stop reason: SDUPTHER

## 2020-07-31 RX ORDER — HYDROCODONE BITARTRATE AND ACETAMINOPHEN 7.5; 325 MG/1; MG/1
1 TABLET ORAL 2 TIMES DAILY PRN
Qty: 60 TABLET | Refills: 0 | Status: SHIPPED | OUTPATIENT
Start: 2020-09-02 | End: 2020-10-01 | Stop reason: SDUPTHER

## 2020-07-31 NOTE — PROGRESS NOTES
Subjective    CC bilateral feet and leg pain.  Andrea Dumont is a 45 y.o. male with multiple sclerosis, peripheral neuropathy here for follow up by tel  Continued chronic bilateral feet and lower leg neuropathic pain and myofascial pain constant but worse with activity.  Denies new weakness saddle anesthesia bladder bowel continence.  Chronic axial back pain without radicular pain.  Chronic polyarthralgia.  Sees neurology for MS  Pain interfering with daily activity work and sleep.  Tried gabapentin did not tolerate.  Currently utilizes Lyrica but can only tolerate once a day 75 mg.  Prescribed by PCP.  Cannot take NSAIDs due to polycystic kidney disease    T-spine MRI 2019 suspected demyelinating plaque centered at about the T8-9 level.  No other definite demyelinating plaques are seen.  L-spine and C-spine MRI 2019 no cord abnormality, minimal degenerative disc disease without canal stenosis or significant foraminal narrowing.    Pain Assessment   Location of Pain:  ana Leg, joint, back  Description of Pain: Dull/Aching, Throbbing, Stabbing  Previous Pain Rating :5   Current Pain Ratin  Aggravating Factors: Activity  Alleviating Factors: Rest, Medication    PEG Assessment   What number best describes your pain on average in the past week?8  What number best describes how, during the past week, pain has interfered with your enjoyment of life?4  What number best describes how, during the past week, pain has interfered with your general activity?5     The following portions of the patient's history were reviewed and updated as appropriate: allergies, current medications, past family history, past medical history, past social history, past surgical history and problem list.     has a past medical history of Arthritis, High blood pressure, Knee pain, left, Leg pain, Multiple sclerosis (CMS/HCC), Pain in both feet, and Polycystic kidney disease.   has a past surgical history that includes Vasectomy; Cyst  Removal; and Shoulder surgery (Left, 02/22/2018).  family history includes Cancer in an other family member; Diabetes in an other family member; Hyperlipidemia in his father; Transient ischemic attack in his father.  Social History     Tobacco Use   • Smoking status: Former Smoker     Last attempt to quit: 2010     Years since quitting: 10.5   • Smokeless tobacco: Never Used   Substance Use Topics   • Alcohol use: Yes     Frequency: Never     Comment: occ       Review of Systems   Constitutional: Negative for chills, diaphoresis, fatigue and fever.   HENT: Negative for swollen glands and trouble swallowing.    Respiratory: Negative.  Negative for shortness of breath.    Cardiovascular: Negative for chest pain, palpitations and leg swelling.   Gastrointestinal: Negative for abdominal pain, nausea and vomiting.   Genitourinary: Negative for urinary incontinence.   Musculoskeletal: Positive for back pain and neck pain. Negative for arthralgias, gait problem, joint swelling, myalgias and neck stiffness.        Ronan leg pain   Skin: Negative for color change, dry skin, rash and skin lesions.   Neurological: Positive for weakness. Negative for dizziness, syncope, light-headedness, headache and confusion.   Hematological: Negative for adenopathy. Does not bruise/bleed easily.   Psychiatric/Behavioral: Negative for behavioral problems, suicidal ideas and depressed mood.   All other systems reviewed and are negative.    Objective   Physical Exam   Constitutional: He is oriented to person, place, and time. No distress.   Pulmonary/Chest: Effort normal.   Neurological: He is alert and oriented to person, place, and time.   Psychiatric: He has a normal mood and affect. His behavior is normal.     PHQ 9 on chart  Opioid risk tool moderate risk (age)    Assessment/Plan    Andrea was seen today for peripheral neuropathy, back pain, generalized body aches and follow-up.    Diagnoses and all orders for this visit:    Chronic  pain syndrome  -     HYDROcodone-acetaminophen (NORCO) 7.5-325 MG per tablet; Take 1 tablet by mouth 2 (Two) Times a Day As Needed for Severe Pain . DNF before 9/2/2020  -     HYDROcodone-acetaminophen (NORCO) 7.5-325 MG per tablet; Take 1 tablet by mouth 2 (Two) Times a Day As Needed for Severe Pain  (takes prn).    Neuropathic pain  -     HYDROcodone-acetaminophen (NORCO) 7.5-325 MG per tablet; Take 1 tablet by mouth 2 (Two) Times a Day As Needed for Severe Pain  (takes prn).    Multiple sclerosis (CMS/HCC)  -     HYDROcodone-acetaminophen (NORCO) 7.5-325 MG per tablet; Take 1 tablet by mouth 2 (Two) Times a Day As Needed for Severe Pain  (takes prn).    Myofascial pain syndrome    High risk medication use    Summary  Andrea Dumont is a 45 y.o. male with multiple sclerosis, peripheral neuropathy here for f/u by Video Visit.   Chronic bilateral lower extremity neuropathic pain/myofascial pain related to MS.  Fitting with ADL and work.  Cannot take NSAIDs due to polycystic kidney disease.    Continue hydrocodone 7.5/325 twice daily as needed for severe pain.  UDS and Inspect reviewed.   Discussed risk of tolerance, dependence, respiratory depression, coma and death associated with use of oral opioids for treatment of chronic nonmalignant pain.     Keep follow-up with neurology.    telemedicine done to avoid coronavirus exposure.Discussed CDC guidelines and precaution regarding current epidemic.  Unable to complete visit using a video connection to the patient. A phone visit was used to complete visit. Total time  21 minutes    RTC 2  month

## 2020-08-04 ENCOUNTER — APPOINTMENT (OUTPATIENT)
Dept: PAIN MEDICINE | Facility: CLINIC | Age: 45
End: 2020-08-04

## 2020-09-09 ENCOUNTER — TRANSCRIBE ORDERS (OUTPATIENT)
Dept: ADMINISTRATIVE | Facility: HOSPITAL | Age: 45
End: 2020-09-09

## 2020-09-09 DIAGNOSIS — G35 MS (MULTIPLE SCLEROSIS) (HCC): Primary | ICD-10-CM

## 2020-09-15 ENCOUNTER — HOSPITAL ENCOUNTER (OUTPATIENT)
Dept: MRI IMAGING | Facility: HOSPITAL | Age: 45
End: 2020-09-15

## 2020-09-15 ENCOUNTER — APPOINTMENT (OUTPATIENT)
Dept: MRI IMAGING | Facility: HOSPITAL | Age: 45
End: 2020-09-15

## 2020-10-01 ENCOUNTER — TELEPHONE (OUTPATIENT)
Dept: PAIN MEDICINE | Facility: HOSPITAL | Age: 45
End: 2020-10-01

## 2020-10-01 ENCOUNTER — OFFICE VISIT (OUTPATIENT)
Dept: PAIN MEDICINE | Facility: CLINIC | Age: 45
End: 2020-10-01

## 2020-10-01 VITALS — BODY MASS INDEX: 27.77 KG/M2 | WEIGHT: 205 LBS | HEIGHT: 72 IN | RESPIRATION RATE: 16 BRPM

## 2020-10-01 DIAGNOSIS — G35 MULTIPLE SCLEROSIS (HCC): ICD-10-CM

## 2020-10-01 DIAGNOSIS — M79.2 NEUROPATHIC PAIN: ICD-10-CM

## 2020-10-01 DIAGNOSIS — G89.4 CHRONIC PAIN SYNDROME: Primary | ICD-10-CM

## 2020-10-01 DIAGNOSIS — M79.18 MYOFASCIAL PAIN SYNDROME: ICD-10-CM

## 2020-10-01 DIAGNOSIS — Z79.899 HIGH RISK MEDICATION USE: ICD-10-CM

## 2020-10-01 PROCEDURE — 99443 PR PHYS/QHP TELEPHONE EVALUATION 21-30 MIN: CPT | Performed by: ANESTHESIOLOGY

## 2020-10-01 RX ORDER — HYDROCODONE BITARTRATE AND ACETAMINOPHEN 7.5; 325 MG/1; MG/1
1 TABLET ORAL 2 TIMES DAILY PRN
Qty: 60 TABLET | Refills: 0 | Status: SHIPPED | OUTPATIENT
Start: 2020-11-01 | End: 2020-11-30 | Stop reason: SDUPTHER

## 2020-10-01 RX ORDER — HYDROCODONE BITARTRATE AND ACETAMINOPHEN 7.5; 325 MG/1; MG/1
1 TABLET ORAL 2 TIMES DAILY PRN
Qty: 60 TABLET | Refills: 0 | Status: SHIPPED | OUTPATIENT
Start: 2020-10-01 | End: 2020-11-30 | Stop reason: SDUPTHER

## 2020-10-01 NOTE — PROGRESS NOTES
Subjective    CC bilateral feet and leg pain.  Andrea Dumont is a 45 y.o. male with multiple sclerosis, peripheral neuropathy here for follow up by telephone.  No new issues. Traveling a lot for work. Functional benefit with Hydrocodone.   Chronic bilateral feet and lower leg neuropathic pain and myofascial pain constant but worse with activity.  Denies new weakness saddle anesthesia bladder bowel continence.  Chronic axial back pain without radicular pain.  Chronic polyarthralgia.  Sees neurology for MS  Pain interfering with daily activity work and sleep.  Tried gabapentin did not tolerate.  Currently utilizes Lyrica but can only tolerate once a day 75 mg.  Prescribed by PCP.  Cannot take NSAIDs due to polycystic kidney disease    T-spine MRI 2019 suspected demyelinating plaque centered at about the T8-9 level.  No other definite demyelinating plaques are seen.  L-spine and C-spine MRI 2019 no cord abnormality, minimal degenerative disc disease without canal stenosis or significant foraminal narrowing.    Pain Assessment   Location of Pain:  ana Leg, joint, back  Description of Pain: Dull/Aching, Throbbing, Stabbing  Previous Pain Rating :5   Current Pain Ratin  Aggravating Factors: Activity  Alleviating Factors: Rest, Medication    PEG Assessment   What number best describes your pain on average in the past week?4  What number best describes how, during the past week, pain has interfered with your enjoyment of life?4  What number best describes how, during the past week, pain has interfered with your general activity?5     The following portions of the patient's history were reviewed and updated as appropriate: allergies, current medications, past family history, past medical history, past social history, past surgical history and problem list.     has a past medical history of Arthritis, High blood pressure, Knee pain, left, Leg pain, Multiple sclerosis (CMS/HCC), Pain in both feet, and Polycystic  kidney disease.   has a past surgical history that includes Vasectomy; Cyst Removal; and Shoulder surgery (Left, 02/22/2018).  family history includes Cancer in an other family member; Diabetes in an other family member; Hyperlipidemia in his father; Transient ischemic attack in his father.  Social History     Tobacco Use   • Smoking status: Former Smoker     Quit date: 2010     Years since quitting: 10.7   • Smokeless tobacco: Never Used   Substance Use Topics   • Alcohol use: Yes     Frequency: Never     Comment: occ       Review of Systems   Constitutional: Negative for chills, diaphoresis, fatigue and fever.   HENT: Negative for swollen glands and trouble swallowing.    Respiratory: Negative.  Negative for shortness of breath.    Cardiovascular: Negative for chest pain, palpitations and leg swelling.   Gastrointestinal: Negative for abdominal pain, nausea and vomiting.   Genitourinary: Negative for urinary incontinence.   Musculoskeletal: Positive for back pain and neck pain. Negative for arthralgias, gait problem, joint swelling, myalgias and neck stiffness.        Ronan leg pain   Skin: Negative for color change, dry skin, rash and skin lesions.   Neurological: Positive for weakness. Negative for dizziness, syncope, light-headedness, headache and confusion.   Hematological: Negative for adenopathy. Does not bruise/bleed easily.   Psychiatric/Behavioral: Negative for behavioral problems, suicidal ideas and depressed mood.   All other systems reviewed and are negative.    Objective   Physical Exam   Constitutional: No distress.   Neurological: He is alert.     PHQ 9 on chart  Opioid risk tool moderate risk (age)    Assessment/Plan    Andrea was seen today for foot pain, generalized body aches and follow-up.    Diagnoses and all orders for this visit:    Chronic pain syndrome  -     HYDROcodone-acetaminophen (NORCO) 7.5-325 MG per tablet; Take 1 tablet by mouth 2 (Two) Times a Day As Needed for Severe Pain   (takes prn).  -     HYDROcodone-acetaminophen (NORCO) 7.5-325 MG per tablet; Take 1 tablet by mouth 2 (Two) Times a Day As Needed for Severe Pain . DNF before 11/1/2020    Neuropathic pain  -     HYDROcodone-acetaminophen (NORCO) 7.5-325 MG per tablet; Take 1 tablet by mouth 2 (Two) Times a Day As Needed for Severe Pain  (takes prn).    Multiple sclerosis (CMS/HCC)  -     HYDROcodone-acetaminophen (NORCO) 7.5-325 MG per tablet; Take 1 tablet by mouth 2 (Two) Times a Day As Needed for Severe Pain  (takes prn).    Myofascial pain syndrome    High risk medication use    Summary  Andrea Dumont is a 45 y.o. male with multiple sclerosis, peripheral neuropathy here for f/u by Video Visit.   Chronic bilateral lower extremity neuropathic pain/myofascial pain related to MS.  Fitting with ADL and work.  Cannot take NSAIDs due to polycystic kidney disease.    Continue hydrocodone 7.5/325 twice daily as needed for severe pain.  UDS and Inspect reviewed.   Discussed risk of tolerance, dependence, respiratory depression, coma and death associated with use of oral opioids for treatment of chronic nonmalignant pain.     telemedicine done to avoid coronavirus exposure.   A phone visit was used to complete visit. Total time  23 minutes    RTC 2  month

## 2020-10-05 ENCOUNTER — APPOINTMENT (OUTPATIENT)
Dept: MRI IMAGING | Facility: HOSPITAL | Age: 45
End: 2020-10-05

## 2020-10-08 ENCOUNTER — HOSPITAL ENCOUNTER (OUTPATIENT)
Dept: MRI IMAGING | Facility: HOSPITAL | Age: 45
Discharge: HOME | End: 2020-10-08

## 2020-10-08 ENCOUNTER — LAB (OUTPATIENT)
Dept: LAB | Facility: HOSPITAL | Age: 45
End: 2020-10-08

## 2020-10-08 DIAGNOSIS — G35 MULTIPLE SCLEROSIS (HCC): ICD-10-CM

## 2020-10-08 DIAGNOSIS — G35 MS (MULTIPLE SCLEROSIS) (HCC): ICD-10-CM

## 2020-10-08 LAB — CREAT BLDA-MCNC: 0.9 MG/DL (ref 0.6–1.3)

## 2020-10-08 PROCEDURE — 72157 MRI CHEST SPINE W/O & W/DYE: CPT

## 2020-10-08 PROCEDURE — 82565 ASSAY OF CREATININE: CPT

## 2020-10-08 PROCEDURE — 72156 MRI NECK SPINE W/O & W/DYE: CPT

## 2020-10-08 PROCEDURE — A9579 GAD-BASE MR CONTRAST NOS,1ML: HCPCS | Performed by: NURSE PRACTITIONER

## 2020-10-08 PROCEDURE — 25010000002 GADOTERIDOL PER 1 ML: Performed by: NURSE PRACTITIONER

## 2020-10-08 PROCEDURE — 70553 MRI BRAIN STEM W/O & W/DYE: CPT

## 2020-10-08 RX ADMIN — GADOTERIDOL 20 ML: 279.3 INJECTION, SOLUTION INTRAVENOUS at 14:45

## 2020-11-30 ENCOUNTER — OFFICE VISIT (OUTPATIENT)
Dept: PAIN MEDICINE | Facility: CLINIC | Age: 45
End: 2020-11-30

## 2020-11-30 VITALS
HEART RATE: 107 BPM | BODY MASS INDEX: 29.4 KG/M2 | TEMPERATURE: 97.1 F | OXYGEN SATURATION: 96 % | DIASTOLIC BLOOD PRESSURE: 87 MMHG | RESPIRATION RATE: 16 BRPM | HEIGHT: 71 IN | SYSTOLIC BLOOD PRESSURE: 147 MMHG | WEIGHT: 210 LBS

## 2020-11-30 DIAGNOSIS — G35 MULTIPLE SCLEROSIS (HCC): ICD-10-CM

## 2020-11-30 DIAGNOSIS — G89.4 CHRONIC PAIN SYNDROME: Primary | ICD-10-CM

## 2020-11-30 DIAGNOSIS — M79.2 NEUROPATHIC PAIN: ICD-10-CM

## 2020-11-30 DIAGNOSIS — M79.18 MYOFASCIAL PAIN SYNDROME: ICD-10-CM

## 2020-11-30 DIAGNOSIS — Z79.899 HIGH RISK MEDICATION USE: ICD-10-CM

## 2020-11-30 PROCEDURE — 99214 OFFICE O/P EST MOD 30 MIN: CPT | Performed by: ANESTHESIOLOGY

## 2020-11-30 RX ORDER — HYDROCODONE BITARTRATE AND ACETAMINOPHEN 7.5; 325 MG/1; MG/1
1 TABLET ORAL 3 TIMES DAILY PRN
Qty: 75 TABLET | Refills: 0 | Status: SHIPPED | OUTPATIENT
Start: 2020-12-30 | End: 2021-01-28 | Stop reason: SDUPTHER

## 2020-11-30 RX ORDER — HYDROCODONE BITARTRATE AND ACETAMINOPHEN 7.5; 325 MG/1; MG/1
1 TABLET ORAL 3 TIMES DAILY PRN
Qty: 75 TABLET | Refills: 0 | Status: SHIPPED | OUTPATIENT
Start: 2020-11-30 | End: 2021-01-28 | Stop reason: SDUPTHER

## 2020-11-30 NOTE — PROGRESS NOTES
Subjective    CC bilateral feet and leg pain.  Andrea Dumont is a 45 y.o. male with multiple sclerosis, peripheral neuropathy here for follow up.  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  Continued chronic bilateral feet and lower leg neuropathic pain and myofascial pain constant but worse with activity.  Denies new weakness saddle anesthesia bladder bowel continence.  Chronic axial back pain without radicular pain.  Chronic polyarthralgia.  Sees neurology for MS  Pain interfering with daily activity work and sleep.  Tried gabapentin did not tolerate.  Currently utilizes Lyrica but can only tolerate once a day 75 mg.  Prescribed by PCP.  Cannot take NSAIDs due to polycystic kidney disease    T-spine MRI 2019 suspected demyelinating plaque centered at about the T8-9 level.  No other definite demyelinating plaques are seen.  L-spine and C-spine MRI 2019 no cord abnormality, minimal degenerative disc disease without canal stenosis or significant foraminal narrowing.    Pain Assessment   Location of Pain:  ana Leg, joint, back  Description of Pain: Dull/Aching, Throbbing, Stabbing  Previous Pain Rating :5   Current Pain Ratin  Aggravating Factors: Activity  Alleviating Factors: Rest, Medication    PEG Assessment   What number best describes your pain on average in the past week?4  What number best describes how, during the past week, pain has interfered with your enjoyment of life?4  What number best describes how, during the past week, pain has interfered with your general activity?5     The following portions of the patient's history were reviewed and updated as appropriate: allergies, current medications, past family history, past medical history, past social history, past surgical history and problem list.     has a past medical history of Arthritis, High blood pressure, Knee pain, left,  Leg pain, Multiple sclerosis (CMS/HCC), Pain in both feet, and Polycystic kidney disease.   has a past surgical history that includes Vasectomy; Cyst Removal; and Shoulder surgery (Left, 02/22/2018).  family history includes Cancer in an other family member; Diabetes in an other family member; Hyperlipidemia in his father; Transient ischemic attack in his father.  Social History     Tobacco Use   • Smoking status: Former Smoker     Quit date: 2010     Years since quitting: 10.9   • Smokeless tobacco: Never Used   Substance Use Topics   • Alcohol use: Yes     Frequency: Never     Comment: occ       Review of Systems   Constitutional: Negative for chills, diaphoresis, fatigue and fever.   HENT: Negative for swollen glands and trouble swallowing.    Respiratory: Negative.  Negative for shortness of breath.    Cardiovascular: Negative for chest pain, palpitations and leg swelling.   Gastrointestinal: Negative for abdominal pain, nausea and vomiting.   Genitourinary: Negative for urinary incontinence.   Musculoskeletal: Positive for back pain and neck pain. Negative for arthralgias, gait problem, joint swelling, myalgias and neck stiffness.        Ronan leg pain   Skin: Negative for color change, dry skin, rash and skin lesions.   Neurological: Positive for weakness. Negative for dizziness, syncope, light-headedness, headache and confusion.   Hematological: Negative for adenopathy. Does not bruise/bleed easily.   Psychiatric/Behavioral: Negative for behavioral problems, suicidal ideas and depressed mood.   All other systems reviewed and are negative.    Objective   Physical Exam   Constitutional: He is oriented to person, place, and time. He appears well-developed and well-nourished. No distress.   Eyes: Pupils are equal, round, and reactive to light. Conjunctivae are normal.   Neck: Normal range of motion and full passive range of motion without pain. No Kernig's sign noted.   Cardiovascular: Normal heart sounds.    Pulmonary/Chest: Effort normal and breath sounds normal.   Musculoskeletal:      Cervical back: He exhibits tenderness.      Lumbar back: He exhibits tenderness.      Comments: Back: Paraspinal tenderness, positive leg raise on the left.  Pain with extension/side rotation.      Vascular Status -  His right foot exhibits no edema. His left foot exhibits no edema.  Lymphadenopathy:     He has no cervical adenopathy.   Neurological: He is alert and oriented to person, place, and time. He has normal reflexes. A sensory deficit is present. Gait abnormal. Coordination normal.   Skin: Skin is warm and dry. Capillary refill takes less than 2 seconds.   Psychiatric: His behavior is normal.   Vitals reviewed.    PHQ 9 on chart  Opioid risk tool moderate risk (age)    Assessment/Plan    Diagnoses and all orders for this visit:    1. Chronic pain syndrome (Primary)  -     HYDROcodone-acetaminophen (NORCO) 7.5-325 MG per tablet; Take 1 tablet by mouth 3 (Three) Times a Day As Needed for Severe Pain  (takes prn).  Dispense: 75 tablet; Refill: 0  -     HYDROcodone-acetaminophen (NORCO) 7.5-325 MG per tablet; Take 1 tablet by mouth 3 (Three) Times a Day As Needed for Severe Pain . DNF before 12/30/2020  Dispense: 75 tablet; Refill: 0    2. Neuropathic pain    3. Multiple sclerosis (CMS/HCC)    4. Myofascial pain syndrome    5. High risk medication use    Summary  Andrea Dumont is a 45 y.o. male with multiple sclerosis, peripheral neuropathy here for f/u by Video Visit.   Chronic bilateral lower extremity neuropathic pain/myofascial pain related to MS.  Fitting with ADL and work.  Cannot take NSAIDs due to polycystic kidney disease.    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  Last dose of hydrocodone 5 days ago  We will increase to 75 pills a month to take  twice or 3 times daily as needed  Continue  hydrocodone 7.5/325 2-3 daily as needed for severe pain.#75  UDS and Inspect reviewed.   Discussed risk of tolerance, dependence, respiratory depression, coma and death associated with use of oral opioids for treatment of chronic nonmalignant pain.     RTC 2  month

## 2021-01-08 ENCOUNTER — APPOINTMENT (OUTPATIENT)
Dept: GENERAL RADIOLOGY | Facility: HOSPITAL | Age: 46
End: 2021-01-08

## 2021-01-08 ENCOUNTER — HOSPITAL ENCOUNTER (EMERGENCY)
Facility: HOSPITAL | Age: 46
Discharge: HOME | End: 2021-01-08
Admitting: EMERGENCY MEDICINE

## 2021-01-08 VITALS
TEMPERATURE: 98.2 F | HEIGHT: 72 IN | DIASTOLIC BLOOD PRESSURE: 93 MMHG | WEIGHT: 218.7 LBS | RESPIRATION RATE: 18 BRPM | HEART RATE: 91 BPM | SYSTOLIC BLOOD PRESSURE: 147 MMHG | BODY MASS INDEX: 29.62 KG/M2 | OXYGEN SATURATION: 98 %

## 2021-01-08 DIAGNOSIS — J18.9 PNEUMONIA OF LEFT LUNG DUE TO INFECTIOUS ORGANISM, UNSPECIFIED PART OF LUNG: ICD-10-CM

## 2021-01-08 DIAGNOSIS — U07.1 CLINICAL DIAGNOSIS OF COVID-19: Primary | ICD-10-CM

## 2021-01-08 DIAGNOSIS — R05.9 COUGH: ICD-10-CM

## 2021-01-08 PROCEDURE — 99283 EMERGENCY DEPT VISIT LOW MDM: CPT

## 2021-01-08 PROCEDURE — 71045 X-RAY EXAM CHEST 1 VIEW: CPT

## 2021-01-08 PROCEDURE — 25010000002 DEXAMETHASONE PER 1 MG: Performed by: NURSE PRACTITIONER

## 2021-01-08 PROCEDURE — 96374 THER/PROPH/DIAG INJ IV PUSH: CPT

## 2021-01-08 RX ORDER — AZITHROMYCIN 250 MG/1
TABLET, FILM COATED ORAL
Qty: 6 TABLET | Refills: 0 | Status: SHIPPED | OUTPATIENT
Start: 2021-01-08 | End: 2021-03-18 | Stop reason: ALTCHOICE

## 2021-01-08 RX ORDER — DEXAMETHASONE SODIUM PHOSPHATE 4 MG/ML
6 INJECTION, SOLUTION INTRA-ARTICULAR; INTRALESIONAL; INTRAMUSCULAR; INTRAVENOUS; SOFT TISSUE ONCE
Status: COMPLETED | OUTPATIENT
Start: 2021-01-08 | End: 2021-01-08

## 2021-01-08 RX ORDER — DEXTROMETHORPHAN HYDROBROMIDE AND PROMETHAZINE HYDROCHLORIDE 15; 6.25 MG/5ML; MG/5ML
5 SYRUP ORAL 4 TIMES DAILY PRN
Qty: 100 ML | Refills: 0 | Status: SHIPPED | OUTPATIENT
Start: 2021-01-08 | End: 2021-06-17

## 2021-01-08 RX ADMIN — HYDROCODONE POLISTIREX AND CHLORPHENIRAMINE POLISITREX 5 ML: 10; 8 SUSPENSION, EXTENDED RELEASE ORAL at 19:16

## 2021-01-08 RX ADMIN — DEXAMETHASONE SODIUM PHOSPHATE 6 MG: 4 INJECTION, SOLUTION INTRAMUSCULAR; INTRAVENOUS at 19:16

## 2021-01-08 NOTE — ED PROVIDER NOTES
Subjective   Patient is a 45-year-old male with a history of hypertension, multiple sclerosis, who presents emergency department with a complaint of chest pain with coughing, fever, body aches.  Patient reports he did test positive for COVID-19 on Saturday.  He has had symptoms for about 8 days.  He reports that he feels like his coughing is severe.  He denies any abnormal leg pain or swelling.  No shortness of breath.  Does report he has had intermittent fever at home, for which he is taking Tylenol.  No abdominal pain, nausea, vomiting or diarrhea.  Patient is a nurse here in the emergency department.          Review of Systems   Constitutional: Positive for chills and fever.   Respiratory: Positive for cough. Negative for chest tightness and shortness of breath.    Cardiovascular: Positive for chest pain. Negative for palpitations and leg swelling.   Gastrointestinal: Negative for abdominal pain, diarrhea, nausea and vomiting.   Musculoskeletal: Positive for myalgias.   Skin: Negative for color change.       Past Medical History:   Diagnosis Date   • Arthritis    • High blood pressure    • Knee pain, left    • Leg pain    • Multiple sclerosis (CMS/HCC)    • Pain in both feet    • Polycystic kidney disease        Allergies   Allergen Reactions   • Codeine Hives   • Nsaids Other (See Comments)     Polycystic Kidney disease-- sees nephrologist   • Oxycodone Other (See Comments)     Ringing in ears with meds  ( can take Norco, pt stated)   • Toradol [Ketorolac Tromethamine] Unknown - High Severity     Pt thinks  He can not take this medication   • Tramadol Hives       Past Surgical History:   Procedure Laterality Date   • CYST REMOVAL      on left wrist   • SHOULDER SURGERY Left 02/22/2018    LEFT SHOULDER SCOPE   • VASECTOMY         Family History   Problem Relation Age of Onset   • Cancer Other    • Diabetes Other    • Hyperlipidemia Father    • Transient ischemic attack Father        Social History      Socioeconomic History   • Marital status:      Spouse name: Not on file   • Number of children: Not on file   • Years of education: Not on file   • Highest education level: Not on file   Tobacco Use   • Smoking status: Former Smoker     Quit date:      Years since quittin.0   • Smokeless tobacco: Never Used   Substance and Sexual Activity   • Alcohol use: Yes     Frequency: Never     Comment: occ   • Drug use: No   • Sexual activity: Defer           Objective   Physical Exam  Vitals signs and nursing note reviewed.   Constitutional:       General: He is not in acute distress.     Appearance: He is not ill-appearing, toxic-appearing or diaphoretic.   HENT:      Head: Normocephalic and atraumatic.      Nose: Nose normal.      Mouth/Throat:      Mouth: Mucous membranes are moist.      Pharynx: Oropharynx is clear.   Eyes:      Extraocular Movements: Extraocular movements intact.      Conjunctiva/sclera: Conjunctivae normal.      Pupils: Pupils are equal, round, and reactive to light.   Neck:      Musculoskeletal: Normal range of motion and neck supple.   Cardiovascular:      Rate and Rhythm: Normal rate and regular rhythm.      Heart sounds: Normal heart sounds. No murmur. No friction rub. No gallop.    Pulmonary:      Effort: Pulmonary effort is normal.      Breath sounds: Normal breath sounds. No wheezing, rhonchi or rales.   Abdominal:      General: Bowel sounds are normal.      Palpations: Abdomen is soft.   Musculoskeletal: Normal range of motion.      Right lower leg: No edema.      Left lower leg: No edema.   Skin:     General: Skin is warm and dry.      Capillary Refill: Capillary refill takes less than 2 seconds.   Neurological:      General: No focal deficit present.      Mental Status: He is alert and oriented to person, place, and time.   Psychiatric:         Mood and Affect: Mood normal.         Behavior: Behavior normal.         Procedures           ED Course  ED Course as of   0003   Fri Jan 08, 2021 1929 We discussed Bamlanivimab treatment given patient's medical history and symptoms.  We discussed this at tbe bedside, he declines at this time.     [LB]      ED Course User Index  [LB] Adrianne Gonsales, APRN                                           MDM  Number of Diagnoses or Management Options  Clinical diagnosis of COVID-19:   Cough:   Pneumonia of left lung due to infectious organism, unspecified part of lung:   Diagnosis management comments: Differentials: Pneumonia, worsening COVID-19, PE  This list is not all inclusive and does not constitute the entireity of considered causes.     Labs reviewed by me and significant for the following:     Imaging, Interpreted per radiologist, independently viewed by myself: Xr Chest 1 View    Result Date: 1/8/2021  1.There are slightly prominent vague markings within the lungs particularly the left lung. Whether this might reflect sequela to viral pneumonia given the clinical history is uncertain.  Electronically Signed By-Vladimir Donald MD On:1/8/2021 6:51 PM This report was finalized on [id number] by  Vladimir Donald MD.        PERC negative    Patient was brought back to the emergency department room for evaluation and  placed on appropriate monitoring.   IV was established, blood work obtained.  Vital signs have remained non-concerning, afebrile.  Patient does have a history of hypertension, and reports that he has not been taking his medications per normal.  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  His chest today does reveal findings concerning for some pneumonia, he was given a dose of Decadron here in the ED, will be started on Zithromax, he was also given a dose of test next in the ED as he has been coughing heavily.   He will be given Phenergan DM for coughing at home.        Plan and Disposition: I spoke with the patient at the bedside regarding their plan of care, discharge instruction, home care, prescriptions, and importance follow-up.  We discussed test results at the bedside.  Patient was made aware of indications to return to the emergency department.  Patient agrees with the current plan of care for discharge, verbalized understanding of all instructions    Pt is aware that discharge does not mean that nothing is wrong but it indicates no emergency is present and they must continue care with follow-up as given below or physician of their choice           Amount and/or Complexity of Data Reviewed  Tests in the radiology section of CPT®: reviewed    Patient Progress  Patient progress: stable      Final diagnoses:   Clinical diagnosis of COVID-19   Cough   Pneumonia of left lung due to infectious organism, unspecified part of lung            Adrianne Gonsales, APRN  01/09/21 0003

## 2021-01-09 NOTE — DISCHARGE INSTRUCTIONS
Follow-up your primary care provider, call for an appointment  Return the ED for new worsening symptoms  Drink plenty of fluids

## 2021-01-28 ENCOUNTER — OFFICE VISIT (OUTPATIENT)
Dept: PAIN MEDICINE | Facility: CLINIC | Age: 46
End: 2021-01-28

## 2021-01-28 ENCOUNTER — TELEPHONE (OUTPATIENT)
Dept: PAIN MEDICINE | Facility: CLINIC | Age: 46
End: 2021-01-28

## 2021-01-28 VITALS — HEIGHT: 72 IN | RESPIRATION RATE: 16 BRPM | WEIGHT: 218 LBS | BODY MASS INDEX: 29.53 KG/M2

## 2021-01-28 DIAGNOSIS — G89.4 CHRONIC PAIN SYNDROME: ICD-10-CM

## 2021-01-28 PROCEDURE — 99443 PR PHYS/QHP TELEPHONE EVALUATION 21-30 MIN: CPT | Performed by: ANESTHESIOLOGY

## 2021-01-28 RX ORDER — HYDROCODONE BITARTRATE AND ACETAMINOPHEN 7.5; 325 MG/1; MG/1
1 TABLET ORAL 3 TIMES DAILY PRN
Qty: 75 TABLET | Refills: 0 | Status: SHIPPED | OUTPATIENT
Start: 2021-01-28 | End: 2021-03-18 | Stop reason: SDUPTHER

## 2021-01-28 RX ORDER — HYDROCODONE BITARTRATE AND ACETAMINOPHEN 7.5; 325 MG/1; MG/1
1 TABLET ORAL 3 TIMES DAILY PRN
Qty: 75 TABLET | Refills: 0 | Status: SHIPPED | OUTPATIENT
Start: 2021-02-28 | End: 2021-03-18 | Stop reason: SDUPTHER

## 2021-01-28 RX ORDER — BENZONATATE 100 MG/1
100 CAPSULE ORAL AS NEEDED
COMMUNITY
Start: 2021-01-14 | End: 2021-06-17

## 2021-01-28 NOTE — PROGRESS NOTES
Subjective    CC bilateral feet and leg pain.  Andrea Dumont is a 45 y.o. male with multiple sclerosis, peripheral neuropathy here for follow up by tel.  Doing better with hydrocodone 2-3 times daily since last visit. Recovering from COVID.   Chronic bilateral feet and lower leg neuropathic pain and myofascial pain constant but worse with activity.  Denies new weakness saddle anesthesia bladder bowel continence.  Chronic axial back pain without radicular pain.  Chronic polyarthralgia.  Sees neurology for MS  Pain interfering with daily activity work and sleep.  Tried gabapentin did not tolerate.  Currently utilizes Lyrica but can only tolerate once a day 75 mg.  Prescribed by PCP.  Cannot take NSAIDs due to polycystic kidney disease    T-spine MRI 2019 suspected demyelinating plaque centered at about the T8-9 level.  No other definite demyelinating plaques are seen.  L-spine and C-spine MRI 2019 no cord abnormality, minimal degenerative disc disease without canal stenosis or significant foraminal narrowing.    Pain Assessment   Location of Pain:  ana Leg, joint, back  Description of Pain: Dull/Aching, Throbbing, Stabbing  Previous Pain Rating :5   Current Pain Ratin  Aggravating Factors: Activity  Alleviating Factors: Rest, Medication    PEG Assessment   What number best describes your pain on average in the past week?4  What number best describes how, during the past week, pain has interfered with your enjoyment of life?4  What number best describes how, during the past week, pain has interfered with your general activity?5     The following portions of the patient's history were reviewed and updated as appropriate: allergies, current medications, past family history, past medical history, past social history, past surgical history and problem list.     has a past medical history of Arthritis, COVID-19, High blood pressure, Knee pain, left, Leg pain, Multiple sclerosis (CMS/HCC), Pain in both feet,  Pneumonia, and Polycystic kidney disease.   has a past surgical history that includes Vasectomy; Cyst Removal; and Shoulder surgery (Left, 2018).  family history includes Cancer in an other family member; Diabetes in an other family member; Hyperlipidemia in his father; Transient ischemic attack in his father.  Social History     Tobacco Use   • Smoking status: Former Smoker     Quit date:      Years since quittin.0   • Smokeless tobacco: Never Used   Substance Use Topics   • Alcohol use: Yes     Frequency: Never     Comment: occ       Review of Systems   Musculoskeletal: Positive for arthralgias, back pain and myalgias.   All other systems reviewed and are negative.    Objective   Physical Exam   Constitutional: No distress.     PHQ 9 on chart  Opioid risk tool moderate risk (age)    Assessment/Plan    Diagnoses and all orders for this visit:    1. Chronic pain syndrome  -     HYDROcodone-acetaminophen (NORCO) 7.5-325 MG per tablet; Take 1 tablet by mouth 3 (Three) Times a Day As Needed for Severe Pain . DNF before 2021  Dispense: 75 tablet; Refill: 0  -     HYDROcodone-acetaminophen (NORCO) 7.5-325 MG per tablet; Take 1 tablet by mouth 3 (Three) Times a Day As Needed for Severe Pain  (takes prn).  Dispense: 75 tablet; Refill: 0    Summary  Andrea Dumont is a 45 y.o. male with multiple sclerosis, peripheral neuropathy here for f/u by Video Visit.   Chronic bilateral lower extremity neuropathic pain/myofascial pain related to MS.  Fitting with ADL and work.  Cannot take NSAIDs due to polycystic kidney disease.    Doing better with hydrocodone 2-3 times daily since last visit. Recovering from COVID.   Cont.75 pills a month to take  twice or 3 times daily as needed    Continue hydrocodone 7.5/325 2-3 daily as needed for severe pain.#75  UDS and Inspect reviewed.   Discussed risk of tolerance, dependence, respiratory depression, coma and death associated with use of oral opioids for treatment  of chronic nonmalignant pain.     telemedicine done to avoid coronavirus exposure.  A phone visit was used to complete visit. Total time  22 minutes    RTC 2  month

## 2021-01-29 DIAGNOSIS — F19.90 CURRENT DRUG USE: Primary | ICD-10-CM

## 2021-03-18 ENCOUNTER — OFFICE VISIT (OUTPATIENT)
Dept: PAIN MEDICINE | Facility: CLINIC | Age: 46
End: 2021-03-18

## 2021-03-18 VITALS — BODY MASS INDEX: 29.53 KG/M2 | HEIGHT: 72 IN | WEIGHT: 218 LBS | RESPIRATION RATE: 16 BRPM

## 2021-03-18 DIAGNOSIS — M79.18 MYOFASCIAL PAIN SYNDROME: ICD-10-CM

## 2021-03-18 DIAGNOSIS — G89.4 CHRONIC PAIN SYNDROME: Primary | ICD-10-CM

## 2021-03-18 DIAGNOSIS — Z79.899 HIGH RISK MEDICATION USE: ICD-10-CM

## 2021-03-18 DIAGNOSIS — M79.2 NEUROPATHIC PAIN: ICD-10-CM

## 2021-03-18 DIAGNOSIS — G35 MULTIPLE SCLEROSIS (HCC): ICD-10-CM

## 2021-03-18 PROCEDURE — 99443 PR PHYS/QHP TELEPHONE EVALUATION 21-30 MIN: CPT | Performed by: ANESTHESIOLOGY

## 2021-03-18 RX ORDER — HYDROCODONE BITARTRATE AND ACETAMINOPHEN 7.5; 325 MG/1; MG/1
1 TABLET ORAL 3 TIMES DAILY PRN
Qty: 75 TABLET | Refills: 0 | Status: SHIPPED | OUTPATIENT
Start: 2021-04-28 | End: 2021-05-10 | Stop reason: SDUPTHER

## 2021-03-18 RX ORDER — HYDROCODONE BITARTRATE AND ACETAMINOPHEN 7.5; 325 MG/1; MG/1
1 TABLET ORAL 3 TIMES DAILY PRN
Qty: 75 TABLET | Refills: 0 | Status: SHIPPED | OUTPATIENT
Start: 2021-03-29 | End: 2021-05-10 | Stop reason: SDUPTHER

## 2021-03-18 RX ORDER — GLATIRAMER 40 MG/ML
INJECTION, SOLUTION SUBCUTANEOUS
COMMUNITY

## 2021-05-10 ENCOUNTER — OFFICE VISIT (OUTPATIENT)
Dept: PAIN MEDICINE | Facility: CLINIC | Age: 46
End: 2021-05-10

## 2021-05-10 VITALS
HEART RATE: 99 BPM | SYSTOLIC BLOOD PRESSURE: 135 MMHG | RESPIRATION RATE: 16 BRPM | BODY MASS INDEX: 29.53 KG/M2 | OXYGEN SATURATION: 98 % | HEIGHT: 72 IN | DIASTOLIC BLOOD PRESSURE: 85 MMHG | WEIGHT: 218 LBS

## 2021-05-10 DIAGNOSIS — Z79.899 HIGH RISK MEDICATION USE: ICD-10-CM

## 2021-05-10 DIAGNOSIS — M79.18 MYOFASCIAL PAIN SYNDROME: ICD-10-CM

## 2021-05-10 DIAGNOSIS — G35 MULTIPLE SCLEROSIS (HCC): ICD-10-CM

## 2021-05-10 DIAGNOSIS — M79.2 NEUROPATHIC PAIN: ICD-10-CM

## 2021-05-10 DIAGNOSIS — G89.4 CHRONIC PAIN SYNDROME: Primary | ICD-10-CM

## 2021-05-10 PROCEDURE — 99214 OFFICE O/P EST MOD 30 MIN: CPT | Performed by: ANESTHESIOLOGY

## 2021-05-10 RX ORDER — HYDROCODONE BITARTRATE AND ACETAMINOPHEN 7.5; 325 MG/1; MG/1
1 TABLET ORAL 3 TIMES DAILY PRN
Qty: 75 TABLET | Refills: 0 | Status: SHIPPED | OUTPATIENT
Start: 2021-05-28 | End: 2021-06-17

## 2021-05-10 RX ORDER — HYDROCODONE BITARTRATE AND ACETAMINOPHEN 7.5; 325 MG/1; MG/1
1 TABLET ORAL 3 TIMES DAILY PRN
Qty: 75 TABLET | Refills: 0 | Status: SHIPPED | OUTPATIENT
Start: 2021-06-27 | End: 2021-07-27 | Stop reason: SDUPTHER

## 2021-05-10 NOTE — PROGRESS NOTES
Subjective    CC bilateral feet and leg pain.  Andrea Dumont is a 46 y.o. male with multiple sclerosis, peripheral neuropathy here for follow up.  Denies new issues.  Chronic bilateral feet and lower leg neuropathic pain and myofascial pain constant but worse with activity.  Denies new weakness saddle anesthesia bladder bowel continence.  Chronic axial back pain without radicular pain.  Chronic polyarthralgia.  Sees neurology for MS  Pain interfering with daily activity work and sleep.  Tried gabapentin did not tolerate.  Currently utilizes Lyrica but can only tolerate once a day 75 mg.  Prescribed by PCP.  Cannot take NSAIDs due to polycystic kidney disease    T-spine MRI 2019 suspected demyelinating plaque centered at about the T8-9 level.  No other definite demyelinating plaques are seen.  L-spine and C-spine MRI 2019 no cord abnormality, minimal degenerative disc disease without canal stenosis or significant foraminal narrowing.    Pain Assessment   Location of Pain:  ana Leg, joint, back  Description of Pain: Dull/Aching, Throbbing, Stabbing  Previous Pain Rating :3   Current Pain Rating: 3  Aggravating Factors: Activity  Alleviating Factors: Rest, Medication    PEG Assessment   What number best describes your pain on average in the past week?3  What number best describes how, during the past week, pain has interfered with your enjoyment of life?4  What number best describes how, during the past week, pain has interfered with your general activity?5     The following portions of the patient's history were reviewed and updated as appropriate: allergies, current medications, past family history, past medical history, past social history, past surgical history and problem list.     has a past medical history of Arthritis, COVID-19, High blood pressure, Knee pain, left, Leg pain, Multiple sclerosis (CMS/HCC), Pain in both feet, Pneumonia, and Polycystic kidney disease.   has a past surgical history that  includes Vasectomy; Cyst Removal; and Shoulder surgery (Left, 2018).  family history includes Cancer in an other family member; Diabetes in an other family member; Hyperlipidemia in his father; Transient ischemic attack in his father.  Social History     Tobacco Use   • Smoking status: Former Smoker     Quit date:      Years since quittin.3   • Smokeless tobacco: Never Used   Substance Use Topics   • Alcohol use: Yes     Comment: occ       Review of Systems   Musculoskeletal: Positive for arthralgias, back pain and myalgias.   All other systems reviewed and are negative.    Objective   Physical Exam   Constitutional: No distress.   Pulmonary/Chest: Effort normal.   Psychiatric: His behavior is normal.     PHQ 9 on chart  Opioid risk tool moderate risk (age)    Assessment/Plan    Diagnoses and all orders for this visit:    1. Chronic pain syndrome (Primary)  -     HYDROcodone-acetaminophen (NORCO) 7.5-325 MG per tablet; Take 1 tablet by mouth 3 (Three) Times a Day As Needed for Severe Pain . DNF before 2021  Dispense: 75 tablet; Refill: 0  -     HYDROcodone-acetaminophen (NORCO) 7.5-325 MG per tablet; Take 1 tablet by mouth 3 (Three) Times a Day As Needed for Severe Pain  (takes prn). DNF before 2021  Dispense: 75 tablet; Refill: 0    2. Neuropathic pain    3. Multiple sclerosis (CMS/HCC)    4. Myofascial pain syndrome    5. High risk medication use  -     Urine Drug Screen - Urine, Clean Catch; Future    Summary  Andrea Dumont is a 45 y.o. male with multiple sclerosis, peripheral neuropathy here for f/u.   Chronic bilateral lower extremity neuropathic pain/myofascial pain related to MS.  Fitting with ADL and work.  Cannot take NSAIDs due to polycystic kidney disease.    Denies new issue.    Continue hydrocodone 7.5/325 2-3 daily as needed for severe pain.#75  UDS and Inspect reviewed.   Discussed risk of tolerance, dependence, respiratory depression, coma and death associated with use  of oral opioids for treatment of chronic nonmalignant pain.     RTC 2  month

## 2021-06-17 ENCOUNTER — OFFICE VISIT (OUTPATIENT)
Dept: ORTHOPEDIC SURGERY | Facility: CLINIC | Age: 46
End: 2021-06-17

## 2021-06-17 VITALS
WEIGHT: 214 LBS | HEIGHT: 72 IN | SYSTOLIC BLOOD PRESSURE: 125 MMHG | HEART RATE: 81 BPM | DIASTOLIC BLOOD PRESSURE: 85 MMHG | BODY MASS INDEX: 28.99 KG/M2

## 2021-06-17 DIAGNOSIS — M25.511 CHRONIC RIGHT SHOULDER PAIN: Primary | ICD-10-CM

## 2021-06-17 DIAGNOSIS — G89.29 CHRONIC RIGHT SHOULDER PAIN: Primary | ICD-10-CM

## 2021-06-17 PROCEDURE — 20610 DRAIN/INJ JOINT/BURSA W/O US: CPT | Performed by: ORTHOPAEDIC SURGERY

## 2021-06-17 PROCEDURE — 99213 OFFICE O/P EST LOW 20 MIN: CPT | Performed by: ORTHOPAEDIC SURGERY

## 2021-06-17 RX ORDER — TRIAMCINOLONE ACETONIDE 40 MG/ML
40 INJECTION, SUSPENSION INTRA-ARTICULAR; INTRAMUSCULAR ONCE
Status: COMPLETED | OUTPATIENT
Start: 2021-06-17 | End: 2021-06-17

## 2021-06-17 RX ADMIN — TRIAMCINOLONE ACETONIDE 40 MG: 40 INJECTION, SUSPENSION INTRA-ARTICULAR; INTRAMUSCULAR at 09:11

## 2021-06-17 NOTE — PROGRESS NOTES
"     Patient ID: Andrea Dumont is a 46 y.o. male.    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  No injury.  Most of the pain is over the AC joint.  Worse with lifting.  There is a grinding sensation.  Is a little bit better with heat and ice and oral medication  Past Medical History:   Diagnosis Date   • Arthritis    • COVID-19     2021   • High blood pressure    • Knee pain, left    • Leg pain    • Multiple sclerosis (CMS/HCC)    • Pain in both feet    • Pneumonia      with covid 2021   • Polycystic kidney disease        Past Surgical History:   Procedure Laterality Date   • CYST REMOVAL      on left wrist   • SHOULDER SURGERY Left 2018    LEFT SHOULDER SCOPE   • VASECTOMY         Family History   Problem Relation Age of Onset   • Cancer Other    • Diabetes Other    • Hyperlipidemia Father    • Transient ischemic attack Father           Social History     Occupational History   • Not on file   Tobacco Use   • Smoking status: Former Smoker     Quit date:      Years since quittin.4   • Smokeless tobacco: Never Used   Vaping Use   • Vaping Use: Never used   Substance and Sexual Activity   • Alcohol use: Yes     Comment: occ   • Drug use: No   • Sexual activity: Defer      Review of Systems   Cardiovascular: Negative for chest pain.   Musculoskeletal: Positive for arthralgias.       Objective:    /85   Pulse 81   Ht 182.9 cm (72\")   Wt 97.1 kg (214 lb)   BMI 29.02 kg/m²     Physical Examination:  Right shoulder demonstrates intact skin with moderate pain over the AC joint.  He has significant pain on crossarm adduction.  Passive elevation 180 abduction 150 external rotation 50 internal rotation L5.  Pepin demonstrates mild pain but no weakness.  Speed and supraspinatus negative.  Belly press and liftoff are " 5/5  Sensory and motor exam are intact all distributions. Radial pulse is palpable and capillary refill is less than two seconds to all digits    Imaging:  right Shoulder X-Ray  Indication: Right shoulder pain no injury  AP Y and Lateral views  Findings: Well-maintained glenohumeral joint spaces with complete loss of AC joint space  no bony lesion  Soft tissues normal  normal joint spaces  Hardware appropriately positioned not applicable      no prior studies available for comparison    Assessment:  Right shoulder AC joint arthritis    Plan:  Treatment options discussed.  Recommend beginning with conservative treatment.I recommend injection after todays evaluation.  Risks and benefits were discussed. Under sterile technique and written consent I injected 40mg of Kenalog and 1cc of 1% Lidocaine plain into the AC joint and subacromial space. It was well tolerated.  See me as needed      Procedures         Disclaimer: Please note that areas of this note were completed with computer voice recognition software.  Quite often unanticipated grammatical, syntax, homophones, and other interpretive errors are inadvertently transcribed by the computer software. Please excuse any errors that have escaped final proofreading.

## 2021-07-27 ENCOUNTER — OFFICE VISIT (OUTPATIENT)
Dept: PAIN MEDICINE | Facility: CLINIC | Age: 46
End: 2021-07-27

## 2021-07-27 VITALS
HEART RATE: 78 BPM | RESPIRATION RATE: 16 BRPM | DIASTOLIC BLOOD PRESSURE: 89 MMHG | HEIGHT: 72 IN | OXYGEN SATURATION: 97 % | BODY MASS INDEX: 27.77 KG/M2 | WEIGHT: 205 LBS | SYSTOLIC BLOOD PRESSURE: 126 MMHG

## 2021-07-27 DIAGNOSIS — G89.4 CHRONIC PAIN SYNDROME: Primary | ICD-10-CM

## 2021-07-27 DIAGNOSIS — M79.18 MYOFASCIAL PAIN SYNDROME: ICD-10-CM

## 2021-07-27 DIAGNOSIS — Z79.899 HIGH RISK MEDICATION USE: ICD-10-CM

## 2021-07-27 DIAGNOSIS — M79.2 NEUROPATHIC PAIN: ICD-10-CM

## 2021-07-27 DIAGNOSIS — G35 MULTIPLE SCLEROSIS (HCC): ICD-10-CM

## 2021-07-27 PROCEDURE — 99214 OFFICE O/P EST MOD 30 MIN: CPT | Performed by: ANESTHESIOLOGY

## 2021-07-27 RX ORDER — HYDROCODONE BITARTRATE AND ACETAMINOPHEN 7.5; 325 MG/1; MG/1
1 TABLET ORAL 3 TIMES DAILY PRN
Qty: 75 TABLET | Refills: 0 | Status: SHIPPED | OUTPATIENT
Start: 2021-07-27 | End: 2021-09-21 | Stop reason: SDUPTHER

## 2021-07-27 RX ORDER — HYDROCODONE BITARTRATE AND ACETAMINOPHEN 7.5; 325 MG/1; MG/1
1 TABLET ORAL 3 TIMES DAILY PRN
Qty: 75 TABLET | Refills: 0 | Status: SHIPPED | OUTPATIENT
Start: 2021-08-26 | End: 2021-09-21 | Stop reason: SDUPTHER

## 2021-07-27 NOTE — PROGRESS NOTES
Subjective    CC bilateral feet and leg pain.  Andrea Dumont is a 46 y.o. male with multiple sclerosis, peripheral neuropathy here for follow up.  Continued chronic bilateral feet and lower leg neuropathic pain and myofascial pain constant but worse with activity.  Denies new weakness saddle anesthesia bladder bowel continence.  Chronic axial back pain without radicular pain.  Chronic polyarthralgia.  Sees neurology for MS  Pain interfering with daily activity work and sleep.  Tried gabapentin did not tolerate.  Currently utilizes Lyrica but can only tolerate once a day 75 mg.  Prescribed by PCP.  Cannot take NSAIDs due to polycystic kidney disease    T-spine MRI 2019 suspected demyelinating plaque centered at about the T8-9 level.  No other definite demyelinating plaques are seen.  L-spine and C-spine MRI 2019 no cord abnormality, minimal degenerative disc disease without canal stenosis or significant foraminal narrowing.    Pain Assessment   Location of Pain:  ana Leg, joint, back  Description of Pain: Dull/Aching, Throbbing, Stabbing  Previous Pain Rating :3   Current Pain Ratin  Aggravating Factors: Activity  Alleviating Factors: Rest, Medication    PEG Assessment   What number best describes your pain on average in the past week?3  What number best describes how, during the past week, pain has interfered with your enjoyment of life?3  What number best describes how, during the past week, pain has interfered with your general activity?7     The following portions of the patient's history were reviewed and updated as appropriate: allergies, current medications, past family history, past medical history, past social history, past surgical history and problem list.     has a past medical history of Arthritis, COVID-19, High blood pressure, Knee pain, left, Leg pain, Multiple sclerosis (CMS/HCC), Multiple sclerosis (CMS/HCC), Pain in both feet, Pneumonia, and Polycystic kidney disease.   has a past surgical  history that includes Vasectomy; Cyst Removal; and Shoulder surgery (Left, 2018).  family history includes Cancer in an other family member; Diabetes in an other family member; Hyperlipidemia in his father; Transient ischemic attack in his father.  Social History     Tobacco Use   • Smoking status: Former Smoker     Quit date:      Years since quittin.5   • Smokeless tobacco: Never Used   Substance Use Topics   • Alcohol use: Yes     Comment: occ       Review of Systems   Musculoskeletal: Positive for arthralgias, back pain and myalgias.   All other systems reviewed and are negative.    Objective   Physical Exam   Constitutional: No distress.   Pulmonary/Chest: Effort normal.   Psychiatric: His behavior is normal.     PHQ 9 on chart  Opioid risk tool moderate risk (age)    Assessment/Plan    Diagnoses and all orders for this visit:    1. Chronic pain syndrome (Primary)  -     HYDROcodone-acetaminophen (NORCO) 7.5-325 MG per tablet; Take 1 tablet by mouth 3 (Three) Times a Day As Needed for Severe Pain .  Dispense: 75 tablet; Refill: 0  -     HYDROcodone-acetaminophen (NORCO) 7.5-325 MG per tablet; Take 1 tablet by mouth 3 (Three) Times a Day As Needed for Severe Pain . DNF before 2021  Dispense: 75 tablet; Refill: 0    2. Multiple sclerosis (CMS/HCC)    3. Neuropathic pain    4. Myofascial pain syndrome    5. High risk medication use    Summary  Andrea Dmuont is a 45 y.o. male with multiple sclerosis, peripheral neuropathy here for f/u.   Chronic bilateral lower extremity neuropathic pain/myofascial pain related to MS.  Fitting with ADL and work.  Cannot take NSAIDs due to polycystic kidney disease.    Has stopped Lyrica.  Was utilizing as needed.  No new issues today.    Continue hydrocodone 7.5/325 2-3 daily as needed for severe pain.#75  UDS and Inspect reviewed.   Discussed risk of tolerance, dependence, respiratory depression, coma and death associated with use of oral opioids for  treatment of chronic nonmalignant pain.     RTC 2  month

## 2021-09-21 ENCOUNTER — OFFICE VISIT (OUTPATIENT)
Dept: PAIN MEDICINE | Facility: CLINIC | Age: 46
End: 2021-09-21

## 2021-09-21 VITALS
HEIGHT: 72 IN | SYSTOLIC BLOOD PRESSURE: 148 MMHG | HEART RATE: 80 BPM | OXYGEN SATURATION: 100 % | DIASTOLIC BLOOD PRESSURE: 97 MMHG | BODY MASS INDEX: 27.77 KG/M2 | RESPIRATION RATE: 16 BRPM | WEIGHT: 205 LBS

## 2021-09-21 DIAGNOSIS — Z79.899 HIGH RISK MEDICATION USE: ICD-10-CM

## 2021-09-21 DIAGNOSIS — M79.2 NEUROPATHIC PAIN: ICD-10-CM

## 2021-09-21 DIAGNOSIS — G89.4 CHRONIC PAIN SYNDROME: Primary | ICD-10-CM

## 2021-09-21 DIAGNOSIS — G35 MULTIPLE SCLEROSIS (HCC): ICD-10-CM

## 2021-09-21 DIAGNOSIS — Z79.899 HIGH RISK MEDICATION USE: Primary | ICD-10-CM

## 2021-09-21 DIAGNOSIS — M79.18 MYOFASCIAL PAIN SYNDROME: ICD-10-CM

## 2021-09-21 PROCEDURE — 99214 OFFICE O/P EST MOD 30 MIN: CPT | Performed by: ANESTHESIOLOGY

## 2021-09-21 RX ORDER — HYDROCODONE BITARTRATE AND ACETAMINOPHEN 7.5; 325 MG/1; MG/1
1 TABLET ORAL 3 TIMES DAILY PRN
Qty: 90 TABLET | Refills: 0 | Status: SHIPPED | OUTPATIENT
Start: 2021-10-21 | End: 2021-11-16 | Stop reason: SDUPTHER

## 2021-09-21 RX ORDER — HYDROCODONE BITARTRATE AND ACETAMINOPHEN 7.5; 325 MG/1; MG/1
1 TABLET ORAL 3 TIMES DAILY PRN
Qty: 90 TABLET | Refills: 0 | Status: SHIPPED | OUTPATIENT
Start: 2021-09-21 | End: 2021-11-16 | Stop reason: SDUPTHER

## 2021-09-21 NOTE — PROGRESS NOTES
Subjective    CC bilateral feet and leg pain.  Andrea Dumont is a 46 y.o. male with multiple sclerosis, peripheral neuropathy here for follow up.  Reports having flareups throughout the last 2 months.  Has follow-up with neurology next week with new MRIs.  Has been taking hydrocodone 3 times daily consistently.  Chronic bilateral feet and lower leg neuropathic pain and myofascial pain constant but worse with activity.  Denies new weakness saddle anesthesia bladder bowel continence.  Chronic axial back pain without radicular pain.  Chronic polyarthralgia.  Sees neurology for MS  Pain interfering with daily activity work and sleep.  Tried gabapentin did not tolerate.  Currently utilizes Lyrica but can only tolerate once a day 75 mg.  Prescribed by PCP.  Cannot take NSAIDs due to polycystic kidney disease    T-spine MRI 2019 suspected demyelinating plaque centered at about the T8-9 level.  No other definite demyelinating plaques are seen.  L-spine and C-spine MRI 2019 no cord abnormality, minimal degenerative disc disease without canal stenosis or significant foraminal narrowing.    Pain Assessment   Location of Pain:  ana Leg, joint, back  Description of Pain: Dull/Aching, Throbbing, Stabbing  Previous Pain Rating :3   Current Pain Ratin  Aggravating Factors: Activity  Alleviating Factors: Rest, Medication    PEG Assessment   What number best describes your pain on average in the past week?3  What number best describes how, during the past week, pain has interfered with your enjoyment of life?5  What number best describes how, during the past week, pain has interfered with your general activity?7     The following portions of the patient's history were reviewed and updated as appropriate: allergies, current medications, past family history, past medical history, past social history, past surgical history and problem list.     has a past medical history of Arthritis, COVID-19, High blood pressure, Knee pain,  left, Leg pain, MS (multiple sclerosis) (CMS/HCC), Multiple sclerosis (CMS/HCC), Multiple sclerosis (CMS/HCC), Pain in both feet, Pneumonia, and Polycystic kidney disease.   has a past surgical history that includes Vasectomy; Cyst Removal; and Shoulder surgery (Left, 2018).  family history includes Cancer in an other family member; Diabetes in an other family member; Hyperlipidemia in his father; Transient ischemic attack in his father.  Social History     Tobacco Use   • Smoking status: Former Smoker     Quit date:      Years since quittin.7   • Smokeless tobacco: Never Used   Substance Use Topics   • Alcohol use: Yes     Comment: occ       Review of Systems   Musculoskeletal: Positive for arthralgias, back pain and myalgias.   All other systems reviewed and are negative.    Objective   Physical Exam   Constitutional: No distress.   Pulmonary/Chest: Effort normal.   Vitals reviewed.    PHQ 9 on chart  Opioid risk tool moderate risk (age)    Assessment/Plan    Diagnoses and all orders for this visit:    1. Chronic pain syndrome (Primary)  -     HYDROcodone-acetaminophen (NORCO) 7.5-325 MG per tablet; Take 1 tablet by mouth 3 (Three) Times a Day As Needed for Severe Pain .  Dispense: 90 tablet; Refill: 0  -     HYDROcodone-acetaminophen (NORCO) 7.5-325 MG per tablet; Take 1 tablet by mouth 3 (Three) Times a Day As Needed for Severe Pain . DNF before 10/21/2021  Dispense: 90 tablet; Refill: 0    2. Multiple sclerosis (CMS/HCC)    3. Neuropathic pain    4. Myofascial pain syndrome    5. High risk medication use    Summary  Andrea Dumont is a 46 y.o. male with multiple sclerosis, peripheral neuropathy here for f/u.   Chronic bilateral lower extremity neuropathic pain/myofascial pain related to MS.  Fitting with ADL and work.  Cannot take NSAIDs due to polycystic kidney disease.    Reports having flareups throughout the last 2 months.  Has follow-up with neurology next week with new MRIs.  Has been  taking hydrocodone 3 times daily consistently.    Continue hydrocodone 7.5/325 3 daily as needed for severe pain.#75  UDS and Inspect reviewed.   Discussed risk of tolerance, dependence, respiratory depression, coma and death associated with use of oral opioids for treatment of chronic nonmalignant pain.     RTC 2  month

## 2021-10-13 ENCOUNTER — TRANSCRIBE ORDERS (OUTPATIENT)
Dept: ADMINISTRATIVE | Facility: HOSPITAL | Age: 46
End: 2021-10-13

## 2021-10-13 DIAGNOSIS — G35 MULTIPLE SCLEROSIS (HCC): Primary | ICD-10-CM

## 2021-11-09 ENCOUNTER — APPOINTMENT (OUTPATIENT)
Dept: MRI IMAGING | Facility: HOSPITAL | Age: 46
End: 2021-11-09

## 2021-11-16 ENCOUNTER — OFFICE VISIT (OUTPATIENT)
Dept: PAIN MEDICINE | Facility: CLINIC | Age: 46
End: 2021-11-16

## 2021-11-16 VITALS
DIASTOLIC BLOOD PRESSURE: 85 MMHG | SYSTOLIC BLOOD PRESSURE: 131 MMHG | BODY MASS INDEX: 27.77 KG/M2 | WEIGHT: 205 LBS | HEIGHT: 72 IN | HEART RATE: 77 BPM | OXYGEN SATURATION: 100 % | RESPIRATION RATE: 16 BRPM

## 2021-11-16 DIAGNOSIS — Z79.899 HIGH RISK MEDICATION USE: Primary | ICD-10-CM

## 2021-11-16 DIAGNOSIS — M79.2 NEUROPATHIC PAIN: ICD-10-CM

## 2021-11-16 DIAGNOSIS — G89.4 CHRONIC PAIN SYNDROME: Primary | ICD-10-CM

## 2021-11-16 DIAGNOSIS — Z79.899 HIGH RISK MEDICATION USE: ICD-10-CM

## 2021-11-16 DIAGNOSIS — M79.18 MYOFASCIAL PAIN SYNDROME: ICD-10-CM

## 2021-11-16 DIAGNOSIS — G35 MULTIPLE SCLEROSIS (HCC): ICD-10-CM

## 2021-11-16 PROCEDURE — 99214 OFFICE O/P EST MOD 30 MIN: CPT | Performed by: ANESTHESIOLOGY

## 2021-11-16 RX ORDER — HYDROCODONE BITARTRATE AND ACETAMINOPHEN 7.5; 325 MG/1; MG/1
1 TABLET ORAL 3 TIMES DAILY PRN
Qty: 90 TABLET | Refills: 0 | Status: SHIPPED | OUTPATIENT
Start: 2021-12-19 | End: 2022-01-13 | Stop reason: SDUPTHER

## 2021-11-16 RX ORDER — HYDROCODONE BITARTRATE AND ACETAMINOPHEN 7.5; 325 MG/1; MG/1
1 TABLET ORAL 3 TIMES DAILY PRN
Qty: 90 TABLET | Refills: 0 | Status: SHIPPED | OUTPATIENT
Start: 2021-11-20 | End: 2021-11-18 | Stop reason: SDUPTHER

## 2021-11-16 RX ORDER — METHOCARBAMOL 750 MG/1
50000 TABLET ORAL WEEKLY
COMMUNITY
Start: 2021-10-18 | End: 2022-01-13

## 2021-11-16 RX ORDER — CYANOCOBALAMIN 1000 UG/ML
INJECTION, SOLUTION INTRAMUSCULAR; SUBCUTANEOUS
COMMUNITY
Start: 2021-10-18

## 2021-11-16 NOTE — PROGRESS NOTES
Subjective    CC bilateral feet and leg pain.  Andrea Dumont is a 46 y.o. male with multiple sclerosis, peripheral neuropathy here for follow up.  Had his first Covid vaccine last month and developed lumbar he was hospitalized in .  States MRI brain and spine were done and has seen neurology also has follow-up.  Current complains of continued bilateral upper extremity neuropathic pain and weakness.  Chronic bilateral feet and lower leg neuropathic pain and myofascial pain constant but worse with activity.  Denies new weakness saddle anesthesia bladder bowel continence.  Chronic axial back pain without radicular pain.  Chronic polyarthralgia.  Sees neurology for MS  Pain interfering with daily activity work and sleep.  Tried gabapentin did not tolerate.  Currently utilizes Lyrica but can only tolerate once a day 75 mg.  Prescribed by PCP.  Cannot take NSAIDs due to polycystic kidney disease    T-spine MRI 2019 suspected demyelinating plaque centered at about the T8-9 level.  No other definite demyelinating plaques are seen.  L-spine and C-spine MRI 2019 no cord abnormality, minimal degenerative disc disease without canal stenosis or significant foraminal narrowing.    Pain Assessment   Location of Pain:  ana Leg, joint, back  Description of Pain: Dull/Aching, Throbbing, Stabbing  Previous Pain Rating :6   Current Pain Ratin  Aggravating Factors: Activity  Alleviating Factors: Rest, Medication    PEG Assessment   What number best describes your pain on average in the past week?3  What number best describes how, during the past week, pain has interfered with your enjoyment of life?5  What number best describes how, during the past week, pain has interfered with your general activity?7     The following portions of the patient's history were reviewed and updated as appropriate: allergies, current medications, past family history, past medical history, past social history, past surgical history and problem  list.     has a past medical history of Arthritis, COVID-19, Guillain Barré syndrome (HCC), High blood pressure, Knee pain, left, Leg pain, MS (multiple sclerosis) (HCC), Multiple sclerosis (HCC), Multiple sclerosis (HCC), Pain in both feet, Pneumonia, and Polycystic kidney disease.   has a past surgical history that includes Vasectomy; Cyst Removal; and Shoulder surgery (Left, 2018).  family history includes Cancer in an other family member; Diabetes in an other family member; Hyperlipidemia in his father; Transient ischemic attack in his father.  Social History     Tobacco Use   • Smoking status: Former Smoker     Quit date:      Years since quittin.8   • Smokeless tobacco: Never Used   Substance Use Topics   • Alcohol use: Yes     Comment: occ       Review of Systems   Musculoskeletal: Positive for arthralgias, back pain and myalgias.   All other systems reviewed and are negative.    Objective   Physical Exam   Constitutional: No distress.   Pulmonary/Chest: Effort normal.   Neurological: A sensory deficit is present.   Vitals reviewed.    PHQ 9 on chart  Opioid risk tool moderate risk (age)    Assessment/Plan    Diagnoses and all orders for this visit:    1. Chronic pain syndrome (Primary)  -     HYDROcodone-acetaminophen (NORCO) 7.5-325 MG per tablet; Take 1 tablet by mouth 3 (Three) Times a Day As Needed for Severe Pain . DNF before 2021  Dispense: 90 tablet; Refill: 0  -     HYDROcodone-acetaminophen (NORCO) 7.5-325 MG per tablet; Take 1 tablet by mouth 3 (Three) Times a Day As Needed for Severe Pain .  Dispense: 90 tablet; Refill: 0    2. Multiple sclerosis (HCC)    3. Neuropathic pain    4. Myofascial pain syndrome    5. High risk medication use    Summary  Andrea Dumont is a 46 y.o. male with multiple sclerosis, peripheral neuropathy here for f/u.   Chronic bilateral lower extremity neuropathic pain/myofascial pain related to MS.  Fitting with ADL and work.  Cannot take NSAIDs due  to polycystic kidney disease.    Had his first Covid vaccine last month and developed lumbar he was hospitalized in .  States MRI brain and spine were done and has seen neurology also has follow-up.  Current complains of continued bilateral upper extremity neuropathic pain and weakness.  No change in MRI as far as MS lesions.    Obtain records from [facility].    He currently is not taking Lyrica.  He will check with neurology at next follow-up if it is okay to restart.    Continue hydrocodone 7.5/325 3 daily as needed for severe pain.#75  UDS and Inspect reviewed.   Discussed risk of tolerance, dependence, respiratory depression, coma and death associated with use of oral opioids for treatment of chronic nonmalignant pain.     RTC 2  month

## 2021-11-18 ENCOUNTER — TELEPHONE (OUTPATIENT)
Dept: PAIN MEDICINE | Facility: CLINIC | Age: 46
End: 2021-11-18

## 2021-11-18 DIAGNOSIS — G89.4 CHRONIC PAIN SYNDROME: ICD-10-CM

## 2021-11-18 RX ORDER — HYDROCODONE BITARTRATE AND ACETAMINOPHEN 7.5; 325 MG/1; MG/1
1 TABLET ORAL 3 TIMES DAILY PRN
Qty: 90 TABLET | Refills: 0 | Status: SHIPPED | OUTPATIENT
Start: 2021-11-18 | End: 2022-01-13 | Stop reason: SDUPTHER

## 2021-11-18 NOTE — TELEPHONE ENCOUNTER
Caller: Andrea Dumont    Relationship to patient: Self    Best call back number: [phone]    Patient is needing: PATIENT CALLING AND CHECKING ON THIS. I ADVISED WE WOULD CALL HIM WHEN WE CAN.

## 2021-11-18 NOTE — TELEPHONE ENCOUNTER
Pt LM asking if he could fill his Norco tomorrow instead of Saturday. 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 Pt would like to fill tomorrow before he leaves. Please advise.

## 2022-01-06 ENCOUNTER — OFFICE VISIT (OUTPATIENT)
Dept: ORTHOPEDIC SURGERY | Facility: CLINIC | Age: 47
End: 2022-01-06

## 2022-01-06 VITALS
HEIGHT: 72 IN | DIASTOLIC BLOOD PRESSURE: 79 MMHG | HEART RATE: 84 BPM | BODY MASS INDEX: 27.77 KG/M2 | WEIGHT: 205 LBS | SYSTOLIC BLOOD PRESSURE: 121 MMHG

## 2022-01-06 DIAGNOSIS — M25.511 CHRONIC RIGHT SHOULDER PAIN: Primary | ICD-10-CM

## 2022-01-06 DIAGNOSIS — G89.29 CHRONIC RIGHT SHOULDER PAIN: Primary | ICD-10-CM

## 2022-01-06 PROCEDURE — 20610 DRAIN/INJ JOINT/BURSA W/O US: CPT | Performed by: ORTHOPAEDIC SURGERY

## 2022-01-06 RX ORDER — TRIAMCINOLONE ACETONIDE 40 MG/ML
40 INJECTION, SUSPENSION INTRA-ARTICULAR; INTRAMUSCULAR ONCE
Status: COMPLETED | OUTPATIENT
Start: 2022-01-06 | End: 2022-01-06

## 2022-01-06 RX ADMIN — TRIAMCINOLONE ACETONIDE 40 MG: 40 INJECTION, SUSPENSION INTRA-ARTICULAR; INTRAMUSCULAR at 11:29

## 2022-01-06 NOTE — PROGRESS NOTES
"     Patient ID: Andrea Dumont is a 46 y.o. male.  Right shoulder pain  Follows up for right shoulder pain, had an injection in the AC joint in June 2021  Injection lasted about 6 months and is returned over the AC joint    Review of Systems:  Right shoulder pain      Objective:    /79   Pulse 84   Ht 182.9 cm (72\")   Wt 93 kg (205 lb)   BMI 27.80 kg/m²     Physical Examination:     Right shoulder demonstrates intact skin moderate pain over the AC joint has mild pain no weakness on Speed [place] supraspinatus testing    Imaging:       Assessment:    Right shoulder AC joint arthritis    Plan:   Treatment options again discussed, would like to proceed with repeat injection  I recommend injection after todays evaluation.  Risks and benefits were discussed. Under sterile technique and after timeout and verbal consent I injected 40 mg of Kenalog and 1 mL of 1% Lidocaine plain into the subacromial space and AC joint. It was well tolerated.      Procedures          Disclaimer: Part of this note may be an electronic transcription/translation of spoken language to printed text using the Dragon Dictation System  "

## 2022-01-13 ENCOUNTER — OFFICE VISIT (OUTPATIENT)
Dept: PAIN MEDICINE | Facility: CLINIC | Age: 47
End: 2022-01-13

## 2022-01-13 VITALS
WEIGHT: 205 LBS | DIASTOLIC BLOOD PRESSURE: 100 MMHG | BODY MASS INDEX: 27.77 KG/M2 | SYSTOLIC BLOOD PRESSURE: 150 MMHG | RESPIRATION RATE: 16 BRPM | HEIGHT: 72 IN | OXYGEN SATURATION: 99 % | HEART RATE: 74 BPM

## 2022-01-13 DIAGNOSIS — Z79.899 HIGH RISK MEDICATION USE: ICD-10-CM

## 2022-01-13 DIAGNOSIS — G35 MULTIPLE SCLEROSIS: ICD-10-CM

## 2022-01-13 DIAGNOSIS — M79.18 MYOFASCIAL PAIN SYNDROME: ICD-10-CM

## 2022-01-13 DIAGNOSIS — M79.2 NEUROPATHIC PAIN: ICD-10-CM

## 2022-01-13 DIAGNOSIS — G89.4 CHRONIC PAIN SYNDROME: Primary | ICD-10-CM

## 2022-01-13 PROCEDURE — 99214 OFFICE O/P EST MOD 30 MIN: CPT | Performed by: ANESTHESIOLOGY

## 2022-01-13 RX ORDER — HYDROCODONE BITARTRATE AND ACETAMINOPHEN 7.5; 325 MG/1; MG/1
1 TABLET ORAL 3 TIMES DAILY PRN
Qty: 90 TABLET | Refills: 0 | Status: SHIPPED | OUTPATIENT
Start: 2022-02-14 | End: 2022-03-09 | Stop reason: SDUPTHER

## 2022-01-13 RX ORDER — HYDROCODONE BITARTRATE AND ACETAMINOPHEN 7.5; 325 MG/1; MG/1
1 TABLET ORAL 3 TIMES DAILY PRN
Qty: 90 TABLET | Refills: 0 | Status: SHIPPED | OUTPATIENT
Start: 2022-01-13 | End: 2022-03-09 | Stop reason: SDUPTHER

## 2022-01-13 NOTE — PROGRESS NOTES
Subjective    CC bilateral feet and leg pain.  Andrea Dumont is a 46 y.o. male with multiple sclerosis, peripheral neuropathy here for follow up.  Denies any complaints today except slight worsening of left foot neuropathic pain.  Chronic bilateral feet and lower leg neuropathic pain and myofascial pain constant but worse with activity.  Denies new weakness saddle anesthesia bladder bowel continence.  Chronic axial back pain without radicular pain.  Chronic polyarthralgia.  Sees neurology for MS  Pain interfering with daily activity work and sleep.  Tried gabapentin did not tolerate.  Currently utilizes Lyrica but can only tolerate once a day 75 mg.  Prescribed by PCP.  Cannot take NSAIDs due to polycystic kidney disease    T-spine MRI 2019 suspected demyelinating plaque centered at about the T8-9 level.  No other definite demyelinating plaques are seen.  L-spine and C-spine MRI 2019 no cord abnormality, minimal degenerative disc disease without canal stenosis or significant foraminal narrowing.    Pain Assessment   Location of Pain:  ana Leg, joint, back  Description of Pain: Dull/Aching, Throbbing, Stabbing  Previous Pain Rating :5  Current Pain Ratin  Aggravating Factors: Activity  Alleviating Factors: Rest, Medication    PEG Assessment   What number best describes your pain on average in the past week?3  What number best describes how, during the past week, pain has interfered with your enjoyment of life?5  What number best describes how, during the past week, pain has interfered with your general activity?7     The following portions of the patient's history were reviewed and updated as appropriate: allergies, current medications, past family history, past medical history, past social history, past surgical history and problem list.     has a past medical history of Arthritis, COVID-19, Foot pain, Guillain Barré syndrome (HCC), High blood pressure, Knee pain, left, Leg pain, MS (multiple sclerosis)  (HCC), Multiple sclerosis (HCC), Multiple sclerosis (HCC), Pain in both feet, Pneumonia, and Polycystic kidney disease.   has a past surgical history that includes Vasectomy; Cyst Removal; and Shoulder surgery (Left, 2018).  family history includes Cancer in an other family member; Diabetes in an other family member; Hyperlipidemia in his father; Transient ischemic attack in his father.  Social History     Tobacco Use   • Smoking status: Former Smoker     Quit date:      Years since quittin.0   • Smokeless tobacco: Never Used   Substance Use Topics   • Alcohol use: Yes     Comment: occ       Review of Systems   Musculoskeletal: Positive for arthralgias, back pain and myalgias.   All other systems reviewed and are negative.    Objective   Physical Exam   Constitutional: No distress.   Pulmonary/Chest: Effort normal.   Neurological: A sensory deficit is present.   Vitals reviewed.    PHQ 9 on chart  Opioid risk tool moderate risk (age)    Assessment/Plan    Diagnoses and all orders for this visit:    1. Chronic pain syndrome (Primary)  -     HYDROcodone-acetaminophen (NORCO) 7.5-325 MG per tablet; Take 1 tablet by mouth 3 (Three) Times a Day As Needed for Severe Pain . DNF before 2022  Dispense: 90 tablet; Refill: 0  -     HYDROcodone-acetaminophen (NORCO) 7.5-325 MG per tablet; Take 1 tablet by mouth 3 (Three) Times a Day As Needed for Severe Pain .  Dispense: 90 tablet; Refill: 0    2. Multiple sclerosis (HCC)    3. Neuropathic pain    4. Myofascial pain syndrome    5. High risk medication use    Summary  Andrea Dumont is a 46 y.o. male with multiple sclerosis, peripheral neuropathy here for f/u.   Chronic bilateral lower extremity neuropathic pain/myofascial pain related to MS.  Fitting with ADL and work.  Cannot take NSAIDs due to polycystic kidney disease.    Denies any complaints today except slight worsening of left foot neuropathic pain.  Compounded neuropathic/anti-inflammatory cream  with ketamine.  He currently is not taking Lyrica.  He will check with neurology at next follow-up if it is okay to restart.  Issues he was having of the COVID-vaccine now resolved.    Continue hydrocodone 7.5/325 3 daily as needed for severe pain.#75  UDS and Inspect reviewed.   Discussed risk of tolerance, dependence, respiratory depression, coma and death associated with use of oral opioids for treatment of chronic nonmalignant pain.     RTC 2  month

## 2022-02-05 ENCOUNTER — APPOINTMENT (OUTPATIENT)
Dept: GENERAL RADIOLOGY | Facility: HOSPITAL | Age: 47
End: 2022-02-05

## 2022-02-05 ENCOUNTER — HOSPITAL ENCOUNTER (EMERGENCY)
Facility: HOSPITAL | Age: 47
Discharge: HOME | End: 2022-02-05
Attending: EMERGENCY MEDICINE | Admitting: EMERGENCY MEDICINE

## 2022-02-05 VITALS
DIASTOLIC BLOOD PRESSURE: 83 MMHG | WEIGHT: 211 LBS | OXYGEN SATURATION: 98 % | RESPIRATION RATE: 18 BRPM | HEART RATE: 76 BPM | BODY MASS INDEX: 29.54 KG/M2 | TEMPERATURE: 98.1 F | HEIGHT: 71 IN | SYSTOLIC BLOOD PRESSURE: 122 MMHG
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
## 2022-02-05 DIAGNOSIS — S30.0XXA CONTUSION OF BUTTOCK, INITIAL ENCOUNTER: ICD-10-CM

## 2022-02-05 DIAGNOSIS — S70.01XA CONTUSION OF RIGHT HIP, INITIAL ENCOUNTER: Primary | ICD-10-CM

## 2022-02-05 PROCEDURE — 99283 EMERGENCY DEPT VISIT LOW MDM: CPT

## 2022-02-05 PROCEDURE — 73502 X-RAY EXAM HIP UNI 2-3 VIEWS: CPT

## 2022-02-05 PROCEDURE — 72220 X-RAY EXAM SACRUM TAILBONE: CPT

## 2022-02-05 PROCEDURE — 63710000001 ONDANSETRON ODT 4 MG TABLET DISPERSIBLE: Performed by: EMERGENCY MEDICINE

## 2022-02-05 PROCEDURE — 25010000002 HYDROMORPHONE PER 4 MG: Performed by: EMERGENCY MEDICINE

## 2022-02-05 PROCEDURE — 96372 THER/PROPH/DIAG INJ SC/IM: CPT

## 2022-02-05 RX ORDER — HYDROMORPHONE HCL 110MG/55ML
1 PATIENT CONTROLLED ANALGESIA SYRINGE INTRAVENOUS ONCE
Status: COMPLETED | OUTPATIENT
Start: 2022-02-05 | End: 2022-02-05

## 2022-02-05 RX ORDER — ONDANSETRON 4 MG/1
4 TABLET, ORALLY DISINTEGRATING ORAL ONCE
Status: COMPLETED | OUTPATIENT
Start: 2022-02-05 | End: 2022-02-05

## 2022-02-05 RX ORDER — THIAMINE HCL 50 MG
50 TABLET ORAL DAILY
COMMUNITY

## 2022-02-05 RX ADMIN — ONDANSETRON 4 MG: 4 TABLET, ORALLY DISINTEGRATING ORAL at 13:31

## 2022-02-05 RX ADMIN — HYDROMORPHONE HYDROCHLORIDE 1 MG: 2 INJECTION, SOLUTION INTRAMUSCULAR; INTRAVENOUS; SUBCUTANEOUS at 13:29

## 2022-02-05 NOTE — ED NOTES
Pt c/o right hip pain, pt slipped and fell on ice around midnight last night. Pt is not feeling any numbness or tingling in leg. Pt did not hit head or have LOC, pt is alert and oriented. Pt rates pain in right hip 2/10 at rest and a 7/10 with activity. Pt +2 pedal pulses.      Siomara Milan, RN  02/05/22 9946

## 2022-02-05 NOTE — ED PROVIDER NOTES
Subjective   Chief complaint: Right hip pain    46-year-old male presents with right hip pain after a fall.  Patient states he slipped on ice last night around midnight.  He landed on his right hip and buttocks.  He reports moderate pain.  Pain is worse with movement.  He denies hitting his head or any loss of consciousness.  He denies any other injuries.  He states he has been able to ambulate but with some pain.      History provided by:  Patient      Review of Systems   Constitutional: Negative for fever.   HENT: Negative for congestion.    Respiratory: Negative for cough and shortness of breath.    Cardiovascular: Negative for chest pain.   Gastrointestinal: Negative for abdominal pain.   Musculoskeletal: Negative for back pain and neck pain.        Right hip and tailbone pain   Skin: Negative for rash.   Neurological: Negative for weakness and numbness.       Past Medical History:   Diagnosis Date   • Arthritis    • COVID-19     2021   • Foot pain     PAIN AND NUMBNESS   • Guillain Barré syndrome (HCC)    • High blood pressure    • Knee pain, left    • Leg pain    • MS (multiple sclerosis) (HCC)    • Multiple sclerosis (HCC)    • Multiple sclerosis (HCC)    • Pain in both feet    • Pneumonia      with covid 2021   • Polycystic kidney disease        Allergies   Allergen Reactions   • Codeine Hives   • Nsaids Other (See Comments)     Polycystic Kidney disease-- sees nephrologist   • Tramadol Hives       Past Surgical History:   Procedure Laterality Date   • CYST REMOVAL      on left wrist   • SHOULDER SURGERY Left 2018    LEFT SHOULDER SCOPE   • VASECTOMY         Family History   Problem Relation Age of Onset   • Cancer Other    • Diabetes Other    • Hyperlipidemia Father    • Transient ischemic attack Father        Social History     Socioeconomic History   • Marital status: Legally    Tobacco Use   • Smoking status: Former Smoker     Quit date:      Years since quittin.1   •  "Smokeless tobacco: Never Used   Vaping Use   • Vaping Use: Never used   Substance and Sexual Activity   • Alcohol use: Not Currently     Comment: occ   • Drug use: No   • Sexual activity: Defer       /90   Pulse 84   Temp 97.9 °F (36.6 °C)   Resp 18   Ht 180.3 cm (71\")   Wt 95.7 kg (211 lb)   SpO2 98%   BMI 29.43 kg/m²       Objective   Physical Exam  Vitals and nursing note reviewed.   Constitutional:       Appearance: Normal appearance.   HENT:      Head: Normocephalic and atraumatic.      Mouth/Throat:      Mouth: Mucous membranes are moist.   Eyes:      Pupils: Pupils are equal, round, and reactive to light.   Cardiovascular:      Rate and Rhythm: Normal rate and regular rhythm.   Pulmonary:      Effort: Pulmonary effort is normal. No respiratory distress.   Musculoskeletal:      Comments: Tender to palpation to the right hip with no visible injury or deformity.  There is also tenderness to the sacrum and coccyx area with no visible injury.  Neurovascular intact distally.   Skin:     General: Skin is warm and dry.   Neurological:      General: No focal deficit present.      Mental Status: He is alert and oriented to person, place, and time.         Procedures           ED Course      XR Sacrum & Coccyx    Result Date: 2/5/2022  1. No acute osseous abnormality of the sacrum or coccyx.  Electronically Signed By-Mello Eckert MD On:2/5/2022 1:39 PM This report was finalized on [id number] by  Mello Eckert MD.    XR Hip With or Without Pelvis 2 - 3 View Right    Result Date: 2/5/2022  1. No acute osseous abnormality of the right hip.  Electronically Signed By-Mello Eckert MD On:2/5/2022 1:38 PM This report was finalized on [id number] by  Mello Eckert MD.                                               MDM   X-rays of the right hip as well as sacrum and coccyx showed no fractures.  Patient is stable for discharge.      Final diagnoses:   Contusion of right hip, initial encounter "   Contusion of buttock, initial encounter   Fall, initial encounter       ED Disposition  ED Disposition     ED Disposition Condition Comment    Discharge Stable           Sherrell Ramos, APRN  [address] [facility] [place] [facility]  [phone]    Call in 2 days           Medication List      No changes were made to your prescriptions during this visit.          Ridge Mcnulty MD  02/05/22 [id number]

## 2022-02-07 ENCOUNTER — TELEPHONE (OUTPATIENT)
Dept: PAIN MEDICINE | Facility: CLINIC | Age: 47
End: 2022-02-07

## 2022-02-07 NOTE — TELEPHONE ENCOUNTER
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

## 2022-03-09 ENCOUNTER — OFFICE VISIT (OUTPATIENT)
Dept: PAIN MEDICINE | Facility: CLINIC | Age: 47
End: 2022-03-09

## 2022-03-09 VITALS
OXYGEN SATURATION: 98 % | HEART RATE: 87 BPM | RESPIRATION RATE: 16 BRPM | SYSTOLIC BLOOD PRESSURE: 115 MMHG | WEIGHT: 211 LBS | HEIGHT: 71 IN | BODY MASS INDEX: 29.54 KG/M2 | DIASTOLIC BLOOD PRESSURE: 88 MMHG

## 2022-03-09 DIAGNOSIS — G89.4 CHRONIC PAIN SYNDROME: Primary | ICD-10-CM

## 2022-03-09 DIAGNOSIS — G35 MULTIPLE SCLEROSIS: ICD-10-CM

## 2022-03-09 DIAGNOSIS — M79.2 NEUROPATHIC PAIN: ICD-10-CM

## 2022-03-09 DIAGNOSIS — M79.18 MYOFASCIAL PAIN SYNDROME: ICD-10-CM

## 2022-03-09 DIAGNOSIS — Z79.899 HIGH RISK MEDICATION USE: Primary | ICD-10-CM

## 2022-03-09 DIAGNOSIS — Z79.899 HIGH RISK MEDICATION USE: ICD-10-CM

## 2022-03-09 PROCEDURE — 99214 OFFICE O/P EST MOD 30 MIN: CPT | Performed by: ANESTHESIOLOGY

## 2022-03-09 RX ORDER — HYDROCODONE BITARTRATE AND ACETAMINOPHEN 7.5; 325 MG/1; MG/1
1 TABLET ORAL 3 TIMES DAILY PRN
Qty: 90 TABLET | Refills: 0 | Status: SHIPPED | OUTPATIENT
Start: 2022-04-14 | End: 2022-05-09 | Stop reason: SDUPTHER

## 2022-03-09 RX ORDER — HYDROCODONE BITARTRATE AND ACETAMINOPHEN 7.5; 325 MG/1; MG/1
1 TABLET ORAL 3 TIMES DAILY PRN
Qty: 90 TABLET | Refills: 0 | Status: SHIPPED | OUTPATIENT
Start: 2022-03-15 | End: 2022-04-12 | Stop reason: SDUPTHER

## 2022-03-09 NOTE — PROGRESS NOTES
Subjective    CC bilateral feet and leg pain.  Andrea Dumont is a 46 y.o. male with multiple sclerosis, peripheral neuropathy here for follow up.  Continued chronic bilateral feet and lower leg neuropathic pain and myofascial pain constant but worse with activity.  Denies new weakness saddle anesthesia bladder bowel continence.  Chronic axial back pain without radicular pain.  Chronic polyarthralgia.  Sees neurology for MS  Pain interfering with daily activity work and sleep.  Tried gabapentin did not tolerate.  Currently utilizes Lyrica but can only tolerate once a day 75 mg.  Prescribed by PCP.  Cannot take NSAIDs due to polycystic kidney disease    T-spine MRI 2019 suspected demyelinating plaque centered at about the T8-9 level.  No other definite demyelinating plaques are seen.  L-spine and C-spine MRI 2019 no cord abnormality, minimal degenerative disc disease without canal stenosis or significant foraminal narrowing.    Pain Assessment   Location of Pain:  ana Leg, joint, back  Description of Pain: Dull/Aching, Throbbing, Stabbing  Previous Pain Rating :4  Current Pain Ratin  Aggravating Factors: Activity  Alleviating Factors: Rest, Medication    PEG Assessment   What number best describes your pain on average in the past week?3  What number best describes how, during the past week, pain has interfered with your enjoyment of life?3  What number best describes how, during the past week, pain has interfered with your general activity?8     The following portions of the patient's history were reviewed and updated as appropriate: allergies, current medications, past family history, past medical history, past social history, past surgical history and problem list.     has a past medical history of Arthritis, COVID-19, Foot pain, Guillain Barré syndrome (HCC), High blood pressure, Knee pain, left, Leg pain, MS (multiple sclerosis) (HCC), Multiple sclerosis (HCC), Multiple sclerosis (HCC), Pain in both feet,  Pneumonia, and Polycystic kidney disease.   has a past surgical history that includes Vasectomy; Cyst Removal; and Shoulder surgery (Left, 2018).  family history includes Cancer in an other family member; Diabetes in an other family member; Hyperlipidemia in his father; Transient ischemic attack in his father.  Social History     Tobacco Use   • Smoking status: Former Smoker     Quit date:      Years since quittin.1   • Smokeless tobacco: Never Used   Substance Use Topics   • Alcohol use: Not Currently     Comment: occ       Review of Systems   Musculoskeletal: Positive for arthralgias, back pain and myalgias.   All other systems reviewed and are negative.    Objective   Physical Exam   Constitutional: No distress.   Pulmonary/Chest: Effort normal.   Neurological: A sensory deficit is present.   Vitals reviewed.    PHQ 9 on chart  Opioid risk tool moderate risk (age)    Assessment/Plan    Diagnoses and all orders for this visit:    1. Chronic pain syndrome (Primary)  -     HYDROcodone-acetaminophen (NORCO) 7.5-325 MG per tablet; Take 1 tablet by mouth 3 (Three) Times a Day As Needed for Severe Pain .  Dispense: 90 tablet; Refill: 0  -     HYDROcodone-acetaminophen (NORCO) 7.5-325 MG per tablet; Take 1 tablet by mouth 3 (Three) Times a Day As Needed for Severe Pain . DNF before 2022  Dispense: 90 tablet; Refill: 0    2. Multiple sclerosis (HCC)    3. Neuropathic pain    4. Myofascial pain syndrome    5. High risk medication use    Summary  Andrea Dumont is a 46 y.o. male with multiple sclerosis, peripheral neuropathy here for f/u.   Chronic bilateral lower extremity neuropathic pain/myofascial pain related to MS.  Fitting with ADL and work.  Cannot take NSAIDs due to polycystic kidney disease.    Continue compounded neuropathic/anti-inflammatory cream with ketamine.    Continue hydrocodone 7.5/325 3 daily as needed for severe pain.#75  UDS and Inspect reviewed.   Discussed risk of tolerance,  dependence, respiratory depression, coma and death associated with use of oral opioids for treatment of chronic nonmalignant pain.     RTC 2  month

## 2022-04-12 ENCOUNTER — TELEPHONE (OUTPATIENT)
Dept: PAIN MEDICINE | Facility: CLINIC | Age: 47
End: 2022-04-12

## 2022-04-12 DIAGNOSIS — G89.4 CHRONIC PAIN SYNDROME: ICD-10-CM

## 2022-04-12 RX ORDER — HYDROCODONE BITARTRATE AND ACETAMINOPHEN 7.5; 325 MG/1; MG/1
1 TABLET ORAL 3 TIMES DAILY PRN
Qty: 90 TABLET | Refills: 0 | Status: SHIPPED | OUTPATIENT
Start: 2022-04-12 | End: 2022-05-09 | Stop reason: SDUPTHER

## 2022-04-12 NOTE — TELEPHONE ENCOUNTER
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 PLEASE ADVISE PT AT ABOVE PHONE NUMBER

## 2022-04-12 NOTE — TELEPHONE ENCOUNTER
Rx Refill Note  Requested Prescriptions     Pending Prescriptions Disp Refills   • HYDROcodone-acetaminophen (NORCO) 7.5-325 MG per tablet 90 tablet 0     Sig: Take 1 tablet by mouth 3 (Three) Times a Day As Needed for Severe Pain .      Last office visit with prescribing clinician: 3/9/2022      Next office visit with prescribing clinician: 5/9/2022            Janel Vásquez MA  04/12/22, 13:22 EDT

## 2022-05-09 ENCOUNTER — OFFICE VISIT (OUTPATIENT)
Dept: PAIN MEDICINE | Facility: CLINIC | Age: 47
End: 2022-05-09

## 2022-05-09 VITALS
HEART RATE: 90 BPM | SYSTOLIC BLOOD PRESSURE: 142 MMHG | WEIGHT: 211 LBS | HEIGHT: 71 IN | BODY MASS INDEX: 29.54 KG/M2 | DIASTOLIC BLOOD PRESSURE: 83 MMHG | RESPIRATION RATE: 16 BRPM | OXYGEN SATURATION: 96 %

## 2022-05-09 DIAGNOSIS — Z79.899 HIGH RISK MEDICATION USE: ICD-10-CM

## 2022-05-09 DIAGNOSIS — M79.2 NEUROPATHIC PAIN: ICD-10-CM

## 2022-05-09 DIAGNOSIS — M79.18 MYOFASCIAL PAIN SYNDROME: ICD-10-CM

## 2022-05-09 DIAGNOSIS — G89.4 CHRONIC PAIN SYNDROME: Primary | ICD-10-CM

## 2022-05-09 DIAGNOSIS — G35 MULTIPLE SCLEROSIS: ICD-10-CM

## 2022-05-09 PROCEDURE — 99214 OFFICE O/P EST MOD 30 MIN: CPT | Performed by: ANESTHESIOLOGY

## 2022-05-09 RX ORDER — ERGOCALCIFEROL 1.25 MG/1
CAPSULE ORAL WEEKLY
COMMUNITY
End: 2022-08-26 | Stop reason: HOSPADM

## 2022-05-09 RX ORDER — HYDROCODONE BITARTRATE AND ACETAMINOPHEN 7.5; 325 MG/1; MG/1
1 TABLET ORAL 3 TIMES DAILY PRN
Qty: 90 TABLET | Refills: 0 | Status: SHIPPED | OUTPATIENT
Start: 2022-06-11 | End: 2022-07-11 | Stop reason: SDUPTHER

## 2022-05-09 RX ORDER — HYDROCODONE BITARTRATE AND ACETAMINOPHEN 7.5; 325 MG/1; MG/1
1 TABLET ORAL 3 TIMES DAILY PRN
Qty: 90 TABLET | Refills: 0 | Status: SHIPPED | OUTPATIENT
Start: 2022-05-11 | End: 2022-07-11 | Stop reason: SDUPTHER

## 2022-05-09 NOTE — PROGRESS NOTES
Subjective    CC bilateral feet and leg pain.  Andrea Dumont is a 47 y.o. male with multiple sclerosis, peripheral neuropathy here for follow up.  Reports worsening bilateral feet/cough neuropathic pain and cramping last month.  Seen neurology ER eval, EMG/NCS and MRI ordered.  he has follow-up.  Denies significant back pain.  Continues to have good relief with hydrocodone.  Chronic bilateral feet and lower leg neuropathic pain and myofascial pain constant but worse with activity.  Denies new weakness saddle anesthesia bladder bowel continence.  Chronic axial back pain without radicular pain.  Chronic polyarthralgia.  Sees neurology for MS  Pain interfering with daily activity work and sleep.  Tried gabapentin did not tolerate.  Currently utilizes Lyrica but can only tolerate once a day 75 mg.  Prescribed by PCP.  Cannot take NSAIDs due to polycystic kidney disease    T-spine MRI 2019 suspected demyelinating plaque centered at about the T8-9 level.  No other definite demyelinating plaques are seen.  L-spine and C-spine MRI 2019 no cord abnormality, minimal degenerative disc disease without canal stenosis or significant foraminal narrowing.    Pain Assessment   Location of Pain:  ana Leg, joint, back  Description of Pain: Dull/Aching, Throbbing, Stabbing  Previous Pain Rating :4  Current Pain Ratin  Aggravating Factors: Activity  Alleviating Factors: Rest, Medication    PEG Assessment   What number best describes your pain on average in the past week?3  What number best describes how, during the past week, pain has interfered with your enjoyment of life?3  What number best describes how, during the past week, pain has interfered with your general activity?8     The following portions of the patient's history were reviewed and updated as appropriate: allergies, current medications, past family history, past medical history, past social history, past surgical history and problem list.     has a past medical  history of Arthritis, COVID-19, Foot pain, Guillain Barré syndrome (HCC), High blood pressure, Knee pain, left, Leg pain, MS (multiple sclerosis) (HCC), Multiple sclerosis (HCC), Multiple sclerosis (HCC), Pain in both feet, Pneumonia, and Polycystic kidney disease.   has a past surgical history that includes Vasectomy; Cyst Removal; and Shoulder surgery (Left, 2018).  family history includes Cancer in an other family member; Diabetes in an other family member; Hyperlipidemia in his father; Transient ischemic attack in his father.  Social History     Tobacco Use   • Smoking status: Former Smoker     Quit date:      Years since quittin.3   • Smokeless tobacco: Never Used   Substance Use Topics   • Alcohol use: Not Currently     Comment: occ       Review of Systems   Musculoskeletal: Positive for arthralgias, back pain and myalgias.   All other systems reviewed and are negative.    Objective   Physical Exam   Constitutional: No distress.   Pulmonary/Chest: Effort normal.   Neurological: A sensory deficit is present.   Vitals reviewed.    PHQ 9 on chart  Opioid risk tool moderate risk (age)    Assessment/Plan    Diagnoses and all orders for this visit:    1. Chronic pain syndrome (Primary)  -     HYDROcodone-acetaminophen (NORCO) 7.5-325 MG per tablet; Take 1 tablet by mouth 3 (Three) Times a Day As Needed for Severe Pain .  Dispense: 90 tablet; Refill: 0  -     HYDROcodone-acetaminophen (NORCO) 7.5-325 MG per tablet; Take 1 tablet by mouth 3 (Three) Times a Day As Needed for Severe Pain . DNF before 2022  Dispense: 90 tablet; Refill: 0    2. Multiple sclerosis (HCC)    3. Neuropathic pain    4. Myofascial pain syndrome    5. High risk medication use    Summary  Andrea Dumont is a 47 y.o. male with multiple sclerosis, peripheral neuropathy here for f/u.   Chronic bilateral lower extremity neuropathic pain/myofascial pain related to MS.  Fitting with ADL and work.  Cannot take NSAIDs due to  polycystic kidney disease.    Reports worsening bilateral feet/cough neuropathic pain and cramping last month.  Seen neurology ER eval, EMG/NCS and MRI ordered.  he has follow-up.  Denies significant back pain.  Continues to have good relief with hydrocodone.  Continue compounded neuropathic/anti-inflammatory cream with ketamine.    Continue hydrocodone 7.5/325 3 daily as needed for severe pain.#75  UDS and Inspect reviewed.   Discussed risk of tolerance, dependence, respiratory depression, coma and death associated with use of oral opioids for treatment of chronic nonmalignant pain.     RTC 2  month

## 2022-07-11 ENCOUNTER — OFFICE VISIT (OUTPATIENT)
Dept: PAIN MEDICINE | Facility: CLINIC | Age: 47
End: 2022-07-11

## 2022-07-11 VITALS
DIASTOLIC BLOOD PRESSURE: 82 MMHG | SYSTOLIC BLOOD PRESSURE: 127 MMHG | OXYGEN SATURATION: 93 % | WEIGHT: 211 LBS | HEIGHT: 71 IN | BODY MASS INDEX: 29.54 KG/M2 | HEART RATE: 97 BPM | RESPIRATION RATE: 16 BRPM

## 2022-07-11 DIAGNOSIS — M79.2 NEUROPATHIC PAIN: ICD-10-CM

## 2022-07-11 DIAGNOSIS — Z79.899 HIGH RISK MEDICATION USE: Primary | ICD-10-CM

## 2022-07-11 DIAGNOSIS — M79.18 MYOFASCIAL PAIN SYNDROME: ICD-10-CM

## 2022-07-11 DIAGNOSIS — G89.4 CHRONIC PAIN SYNDROME: ICD-10-CM

## 2022-07-11 DIAGNOSIS — G35 MULTIPLE SCLEROSIS: ICD-10-CM

## 2022-07-11 PROCEDURE — 99214 OFFICE O/P EST MOD 30 MIN: CPT | Performed by: ANESTHESIOLOGY

## 2022-07-11 RX ORDER — HYDROCODONE BITARTRATE AND ACETAMINOPHEN 7.5; 325 MG/1; MG/1
1 TABLET ORAL 3 TIMES DAILY PRN
Qty: 90 TABLET | Refills: 0 | Status: SHIPPED | OUTPATIENT
Start: 2022-08-10 | End: 2022-08-26 | Stop reason: HOSPADM

## 2022-07-11 RX ORDER — METHOCARBAMOL 750 MG/1
50000 TABLET ORAL
COMMUNITY
Start: 2022-05-06

## 2022-07-11 RX ORDER — HYDROCODONE BITARTRATE AND ACETAMINOPHEN 7.5; 325 MG/1; MG/1
1 TABLET ORAL 3 TIMES DAILY PRN
Qty: 90 TABLET | Refills: 0 | Status: SHIPPED | OUTPATIENT
Start: 2022-07-11 | End: 2022-08-26 | Stop reason: HOSPADM

## 2022-07-11 NOTE — PROGRESS NOTES
Subjective    CC bilateral feet and leg pain.  Andrea Dumont is a 47 y.o. male with multiple sclerosis, peripheral neuropathy here for follow up.  Had EMG/NCS bilateral lower extremity and left upper extremity.  Also have thoracic and cervical MRI.  Has follow-up with neurology next week.  Denies new complaints today.  Chronic bilateral feet and lower leg neuropathic pain and myofascial pain constant but worse with activity.  Denies new weakness saddle anesthesia bladder bowel continence.  Chronic axial back pain without radicular pain.  Chronic polyarthralgia.  Sees neurology for MS  Pain interfering with daily activity work and sleep.  Tried gabapentin did not tolerate.  Currently utilizes Lyrica but can only tolerate once a day 75 mg.  Prescribed by PCP.  Cannot take NSAIDs due to polycystic kidney disease    L-spine and T-spine MRI 2022 Probable old demyelinating plaque in the posterior thoracic cord at the T8 level with no abnormal cervical or thoracic cord enhancement to suggest active demyelination. No new demyelinating plaque is identified. At C3-C4, left greater than right uncinate process hypertrophy results in mild to moderate left neural foraminal narrowing.  Otherwise, no significant spinal canal stenosis or neural foraminal narrowing.    Pain Assessment   Location of Pain:  ana Leg, joint, back  Description of Pain: Dull/Aching, Throbbing, Stabbing  Previous Pain Rating :6  Current Pain Ratin  Aggravating Factors: Activity  Alleviating Factors: Rest, Medication    PEG Assessment   What number best describes your pain on average in the past week?3  What number best describes how, during the past week, pain has interfered with your enjoyment of life?5  What number best describes how, during the past week, pain has interfered with your general activity?7     The following portions of the patient's history were reviewed and updated as appropriate: allergies, current medications, past family  history, past medical history, past social history, past surgical history and problem list.     has a past medical history of Arthritis, COVID-19, Foot pain, Guillain Barré syndrome (HCC), Hand numbness, High blood pressure, Knee pain, left, Leg pain, MS (multiple sclerosis) (HCC), Multiple sclerosis (HCC), Multiple sclerosis (HCC), Pain in both feet, Pneumonia, and Polycystic kidney disease.   has a past surgical history that includes Vasectomy; Cyst Removal; and Shoulder surgery (Left, 2018).  family history includes Cancer in an other family member; Dementia in his mother; Diabetes in an other family member; Hyperlipidemia in his father; Transient ischemic attack in his father.  Social History     Tobacco Use   • Smoking status: Former Smoker     Quit date:      Years since quittin.5   • Smokeless tobacco: Never Used   Substance Use Topics   • Alcohol use: Not Currently     Comment: occ       Review of Systems   Musculoskeletal: Positive for arthralgias, back pain and myalgias.   All other systems reviewed and are negative.    Objective   Physical Exam   Constitutional: No distress.   Pulmonary/Chest: Effort normal.   Neurological: A sensory deficit is present.   Vitals reviewed.    PHQ 9 on chart  Opioid risk tool moderate risk (age)    Assessment & Plan    Diagnoses and all orders for this visit:    1. Chronic pain syndrome (Primary)  -     HYDROcodone-acetaminophen (NORCO) 7.5-325 MG per tablet; Take 1 tablet by mouth 3 (Three) Times a Day As Needed for Severe Pain . DNF before 8/10/2022  Dispense: 90 tablet; Refill: 0  -     HYDROcodone-acetaminophen (NORCO) 7.5-325 MG per tablet; Take 1 tablet by mouth 3 (Three) Times a Day As Needed for Severe Pain .  Dispense: 90 tablet; Refill: 0    2. Multiple sclerosis (HCC)    3. Neuropathic pain    4. Myofascial pain syndrome    5. High risk medication use    Summary  Andrea Dumont is a 47 y.o. male with multiple sclerosis, peripheral neuropathy  here for f/u.   Chronic bilateral lower extremity neuropathic pain/myofascial pain related to MS.  Fitting with ADL and work.  Cannot take NSAIDs due to polycystic kidney disease.    Had EMG/NCS bilateral lower extremity and left upper extremity.  Also have thoracic and cervical MRI.  Has follow-up with neurology next week.  Denies new complaints today.    Continue hydrocodone 7.5/325 3 daily as needed for severe pain.#75  UDS and Inspect reviewed.   Discussed risk of tolerance, dependence, respiratory depression, coma and death associated with use of oral opioids for treatment of chronic nonmalignant pain.     RTC 2  month

## 2022-08-04 ENCOUNTER — PREP FOR SURGERY (OUTPATIENT)
Dept: OTHER | Facility: HOSPITAL | Age: 47
End: 2022-08-04

## 2022-08-04 ENCOUNTER — OFFICE VISIT (OUTPATIENT)
Dept: ORTHOPEDIC SURGERY | Facility: CLINIC | Age: 47
End: 2022-08-04

## 2022-08-04 VITALS — BODY MASS INDEX: 29.54 KG/M2 | HEIGHT: 71 IN | WEIGHT: 211 LBS | HEART RATE: 77 BPM

## 2022-08-04 DIAGNOSIS — M19.011 DEGENERATIVE JOINT DISEASE OF RIGHT ACROMIOCLAVICULAR JOINT: Primary | ICD-10-CM

## 2022-08-04 PROCEDURE — 99214 OFFICE O/P EST MOD 30 MIN: CPT | Performed by: ORTHOPAEDIC SURGERY

## 2022-08-04 NOTE — PROGRESS NOTES
"     Patient ID: Andrea Dumont is a 47 y.o. male.  Right shoulder pain  Returns with worsening right shoulder pain over the AC joint has had 2 previous injections with diminishing returns most recently in January of this year    Review of Systems:  Right shoulder pain      Objective:    Pulse 77   Ht 180.3 cm (71\")   Wt 95.7 kg (211 lb)   BMI 29.43 kg/m²     Physical Examination:   Right shoulder demonstrates intact skin moderate pain over the AC joint has mild pain no weakness on Speed [place] supraspinatus testing.  Moderate pain on crossarm adduction.       Imaging:       Assessment:    AC joint arthritis right side    Plan:   Options discussed he would like to proceed with right shoulder arthroscopy with distal clavicle excision  Risks and benefits, specifically risks of bleeding, scar, infection, fracture, stiffness, retear, nerve, tendon or artery damage, the need for further surgery, DVT, and loss of life or limb and rehab were discussed. All questions were answered and addressed.      Procedures          Disclaimer: Part of this note may be an electronic transcription/translation of spoken language to printed text using the Dragon Dictation System  "

## 2022-08-10 ENCOUNTER — TELEPHONE (OUTPATIENT)
Dept: PAIN MEDICINE | Facility: CLINIC | Age: 47
End: 2022-08-10

## 2022-08-10 PROBLEM — M19.011 DEGENERATIVE JOINT DISEASE OF RIGHT ACROMIOCLAVICULAR JOINT: Status: ACTIVE | Noted: 2022-08-10

## 2022-08-10 NOTE — TELEPHONE ENCOUNTER
8/10/22-- Dr ROSA-- please read pt note from hub and advise-- I left pt a voice message that I will send message to you and usually the Surgeon will take over pain meds    And after surgery   Dr ROSA will take back over pts care, if that was the question about the auth ,that pt was needing

## 2022-08-10 NOTE — TELEPHONE ENCOUNTER
Caller: Andrea Dumont    Relationship to patient: Self    Best call back number:     Patient is needing: PATIENT IS HAVING SURGERY ON HIS SHOULDER ON  8/26/22 AND IS CALLING TO GET AUTH FROM OFFICE. HE WASN'T EXACTLY SURE EXACTLY WHAT HE NEEDED TO DO. PLEASE CALL TO ASSIST, THANK YOU!

## 2022-08-15 ENCOUNTER — HOSPITAL ENCOUNTER (OUTPATIENT)
Dept: CARDIOLOGY | Facility: HOSPITAL | Age: 47
Discharge: HOME | End: 2022-08-15

## 2022-08-15 ENCOUNTER — LAB (OUTPATIENT)
Dept: LAB | Facility: HOSPITAL | Age: 47
End: 2022-08-15

## 2022-08-15 DIAGNOSIS — M19.011 DEGENERATIVE JOINT DISEASE OF RIGHT ACROMIOCLAVICULAR JOINT: ICD-10-CM

## 2022-08-15 LAB
ANION GAP SERPL CALCULATED.3IONS-SCNC: 9 MMOL/L (ref 5–15)
APTT PPP: 31.6 SECONDS (ref 24–31)
BASOPHILS # BLD AUTO: 0.04 10*3/MM3 (ref 0–0.2)
BASOPHILS NFR BLD AUTO: 0.7 % (ref 0–1.5)
BUN SERPL-MCNC: 11 MG/DL (ref 6–20)
BUN/CREAT SERPL: 12.5 (ref 7–25)
CALCIUM SPEC-SCNC: 9.3 MG/DL (ref 8.6–10.5)
CHLORIDE SERPL-SCNC: 98 MMOL/L (ref 98–107)
CO2 SERPL-SCNC: 29 MMOL/L (ref 22–29)
CREAT SERPL-MCNC: 0.88 MG/DL (ref 0.76–1.27)
DEPRECATED RDW RBC AUTO: 38.8 FL (ref 37–54)
EGFRCR SERPLBLD CKD-EPI 2021: 108.1 ML/MIN/1.73
EOSINOPHIL # BLD AUTO: 0.11 10*3/MM3 (ref 0–0.4)
EOSINOPHIL NFR BLD AUTO: 1.8 % (ref 0.3–6.2)
ERYTHROCYTE [DISTWIDTH] IN BLOOD BY AUTOMATED COUNT: 13.3 % (ref 12.3–15.4)
GLUCOSE SERPL-MCNC: 101 MG/DL (ref 65–99)
HCT VFR BLD AUTO: 44.5 % (ref 37.5–51)
HGB BLD-MCNC: 15.3 G/DL (ref 13–17.7)
IMM GRANULOCYTES # BLD AUTO: 0.02 10*3/MM3 (ref 0–0.05)
IMM GRANULOCYTES NFR BLD AUTO: 0.3 % (ref 0–0.5)
INR PPP: 1.04 (ref 0.93–1.1)
LYMPHOCYTES # BLD AUTO: 1.62 10*3/MM3 (ref 0.7–3.1)
LYMPHOCYTES NFR BLD AUTO: 27 % (ref 19.6–45.3)
MCH RBC QN AUTO: 28.2 PG (ref 26.6–33)
MCHC RBC AUTO-ENTMCNC: 34.4 G/DL (ref 31.5–35.7)
MCV RBC AUTO: 82.1 FL (ref 79–97)
MONOCYTES # BLD AUTO: 0.71 10*3/MM3 (ref 0.1–0.9)
MONOCYTES NFR BLD AUTO: 11.9 % (ref 5–12)
NEUTROPHILS NFR BLD AUTO: 3.49 10*3/MM3 (ref 1.7–7)
NEUTROPHILS NFR BLD AUTO: 58.3 % (ref 42.7–76)
NRBC BLD AUTO-RTO: 0 /100 WBC (ref 0–0.2)
PLATELET # BLD AUTO: 211 10*3/MM3 (ref 140–450)
PMV BLD AUTO: 10.4 FL (ref 6–12)
POTASSIUM SERPL-SCNC: 3.9 MMOL/L (ref 3.5–5.2)
PROTHROMBIN TIME: 10.7 SECONDS (ref 9.6–11.7)
QT INTERVAL: 372 MS
RBC # BLD AUTO: 5.42 10*6/MM3 (ref 4.14–5.8)
SODIUM SERPL-SCNC: 136 MMOL/L (ref 136–145)
WBC NRBC COR # BLD: 5.99 10*3/MM3 (ref 3.4–10.8)

## 2022-08-15 PROCEDURE — 93005 ELECTROCARDIOGRAM TRACING: CPT | Performed by: ORTHOPAEDIC SURGERY

## 2022-08-15 PROCEDURE — 36415 COLL VENOUS BLD VENIPUNCTURE: CPT

## 2022-08-15 PROCEDURE — 93010 ELECTROCARDIOGRAM REPORT: CPT | Performed by: INTERNAL MEDICINE

## 2022-08-15 PROCEDURE — 85610 PROTHROMBIN TIME: CPT

## 2022-08-15 PROCEDURE — 85025 COMPLETE CBC W/AUTO DIFF WBC: CPT

## 2022-08-15 PROCEDURE — 85730 THROMBOPLASTIN TIME PARTIAL: CPT

## 2022-08-15 PROCEDURE — 80048 BASIC METABOLIC PNL TOTAL CA: CPT

## 2022-08-24 ENCOUNTER — LAB (OUTPATIENT)
Dept: LAB | Facility: HOSPITAL | Age: 47
End: 2022-08-24

## 2022-08-24 DIAGNOSIS — M19.011 DEGENERATIVE JOINT DISEASE OF RIGHT ACROMIOCLAVICULAR JOINT: ICD-10-CM

## 2022-08-24 PROCEDURE — U0004 COV-19 TEST NON-CDC HGH THRU: HCPCS

## 2022-08-24 PROCEDURE — C9803 HOPD COVID-19 SPEC COLLECT: HCPCS

## 2022-08-25 ENCOUNTER — ANESTHESIA EVENT (OUTPATIENT)
Dept: PERIOP | Facility: HOSPITAL | Age: 47
End: 2022-08-25

## 2022-08-25 LAB — SARS-COV-2 ORF1AB RESP QL NAA+PROBE: NOT DETECTED

## 2022-08-25 RX ORDER — PROMETHAZINE HYDROCHLORIDE 12.5 MG/1
12.5 TABLET ORAL EVERY 6 HOURS PRN
Qty: 21 TABLET | Refills: 1 | Status: SHIPPED | OUTPATIENT
Start: 2022-08-25 | End: 2022-12-21 | Stop reason: ALTCHOICE

## 2022-08-25 RX ORDER — OXYCODONE HYDROCHLORIDE AND ACETAMINOPHEN 5; 325 MG/1; MG/1
1 TABLET ORAL EVERY 6 HOURS PRN
Qty: 28 TABLET | Refills: 0 | Status: SHIPPED | OUTPATIENT
Start: 2022-08-25 | End: 2022-09-08

## 2022-08-25 RX ORDER — CEPHALEXIN 500 MG/1
500 CAPSULE ORAL 4 TIMES DAILY
Qty: 4 CAPSULE | Refills: 0 | Status: SHIPPED | OUTPATIENT
Start: 2022-08-25 | End: 2022-08-26

## 2022-08-25 NOTE — H&P
[facility]   HISTORY AND PHYSICAL    Patient Name: Andrae Dumont  : 1975  MRN: [id number]  Primary Care Physician:  Sherrell Ramos APRN  Date of admission: (Not on file)    Subjective   Subjective     Chief Complaint: Right shoulder pain    This is a 47-year-old gentleman presenting for shoulder arthroscopy distal clavicle excision no other acute complaints        Review of Systems   Cardiovascular: Negative for chest pain.   Musculoskeletal: Positive for arthralgias.        Personal History     Past Medical History:   Diagnosis Date   • Arthritis    • COVID-19     2021   • Guillain Barré syndrome (HCC)    • Hand numbness     left-- cramping   • High blood pressure    • Leg pain    • Multiple sclerosis (HCC)    • Pain in both feet    • Pneumonia      with covid 2021   • Polycystic kidney disease    • Right shoulder pain 2022       Past Surgical History:   Procedure Laterality Date   • CYST REMOVAL      on left wrist   • SHOULDER SURGERY Left 2018    LEFT SHOULDER SCOPE   • VASECTOMY         Family History: family history includes Cancer in an other family member; Dementia in his mother; Diabetes in an other family member; Hyperlipidemia in his father; Transient ischemic attack in his father. Otherwise pertinent FHx was reviewed and not pertinent to current issue.    Social History:  reports that he quit smoking about 12 years ago. He has never used smokeless tobacco. He reports previous alcohol use. He reports that he does not use drugs.    Home Medications:  Glatiramer Acetate, HYDROcodone-acetaminophen, cholecalciferol, cyanocobalamin, lisinopril-hydrochlorothiazide, vitamin B-1, and vitamin D    Allergies:  Allergies   Allergen Reactions   • Codeine Hives   • Nsaids Other (See Comments)     Polycystic Kidney disease-- sees nephrologist   • Tramadol Hives       Objective    Objective     Vitals:         Right shoulder demonstrates intact skin moderate pain over the AC joint has  mild pain no weakness on Speed [place] supraspinatus testing.  Moderate pain on crossarm adduction.        Imaging:         Assessment:    AC joint arthritis right side     Plan:   Options discussed he would like to proceed with right shoulder arthroscopy with distal clavicle excision  Risks and benefits, specifically risks of bleeding, scar, infection, fracture, stiffness, retear, nerve, tendon or artery damage, the need for further surgery, DVT, and loss of life or limb and rehab were discussed. All questions were answered and addressed.    Caden Bain MD

## 2022-08-26 ENCOUNTER — ANESTHESIA (OUTPATIENT)
Dept: PERIOP | Facility: HOSPITAL | Age: 47
End: 2022-08-26

## 2022-08-26 ENCOUNTER — HOSPITAL ENCOUNTER (OUTPATIENT)
Facility: HOSPITAL | Age: 47
Setting detail: HOSPITAL OUTPATIENT SURGERY
Discharge: HOME | End: 2022-08-26
Attending: ORTHOPAEDIC SURGERY | Admitting: ORTHOPAEDIC SURGERY

## 2022-08-26 VITALS
BODY MASS INDEX: 30.1 KG/M2 | HEART RATE: 63 BPM | WEIGHT: 215 LBS | TEMPERATURE: 97 F | OXYGEN SATURATION: 97 % | RESPIRATION RATE: 14 BRPM | HEIGHT: 71 IN | SYSTOLIC BLOOD PRESSURE: 116 MMHG | DIASTOLIC BLOOD PRESSURE: 76 MMHG

## 2022-08-26 DIAGNOSIS — M19.011 DEGENERATIVE JOINT DISEASE OF RIGHT ACROMIOCLAVICULAR JOINT: Primary | ICD-10-CM

## 2022-08-26 PROCEDURE — 29824 SHO ARTHRS SRG DSTL CLAVICLC: CPT | Performed by: ORTHOPAEDIC SURGERY

## 2022-08-26 PROCEDURE — 29824 SHO ARTHRS SRG DSTL CLAVICLC: CPT | Performed by: PHYSICIAN ASSISTANT

## 2022-08-26 PROCEDURE — 76942 ECHO GUIDE FOR BIOPSY: CPT | Performed by: ORTHOPAEDIC SURGERY

## 2022-08-26 PROCEDURE — 25010000002 ONDANSETRON PER 1 MG: Performed by: ANESTHESIOLOGIST ASSISTANT

## 2022-08-26 PROCEDURE — 25010000002 DEXAMETHASONE PER 1 MG: Performed by: ANESTHESIOLOGY

## 2022-08-26 PROCEDURE — 0 MEPERIDINE PER 100 MG: Performed by: ANESTHESIOLOGIST ASSISTANT

## 2022-08-26 PROCEDURE — 25010000002 CEFAZOLIN PER 500 MG: Performed by: ORTHOPAEDIC SURGERY

## 2022-08-26 PROCEDURE — 25010000002 FENTANYL CITRATE (PF) 50 MCG/ML SOLUTION: Performed by: ANESTHESIOLOGY

## 2022-08-26 PROCEDURE — 29826 SHO ARTHRS SRG DECOMPRESSION: CPT | Performed by: PHYSICIAN ASSISTANT

## 2022-08-26 PROCEDURE — 25010000002 PROPOFOL 10 MG/ML EMULSION: Performed by: ANESTHESIOLOGIST ASSISTANT

## 2022-08-26 PROCEDURE — 25010000002 ROPIVACAINE PER 1 MG: Performed by: ANESTHESIOLOGY

## 2022-08-26 PROCEDURE — 0 LIDOCAINE 1 % SOLUTION 10 ML VIAL: Performed by: ORTHOPAEDIC SURGERY

## 2022-08-26 PROCEDURE — 29826 SHO ARTHRS SRG DECOMPRESSION: CPT | Performed by: ORTHOPAEDIC SURGERY

## 2022-08-26 PROCEDURE — 25010000002 EPINEPHRINE PER 0.1 MG: Performed by: ORTHOPAEDIC SURGERY

## 2022-08-26 PROCEDURE — 25010000002 MIDAZOLAM PER 1 MG: Performed by: ANESTHESIOLOGY

## 2022-08-26 RX ORDER — IPRATROPIUM BROMIDE AND ALBUTEROL SULFATE 2.5; .5 MG/3ML; MG/3ML
3 SOLUTION RESPIRATORY (INHALATION) ONCE AS NEEDED
Status: DISCONTINUED | OUTPATIENT
Start: 2022-08-26 | End: 2022-08-26 | Stop reason: HOSPADM

## 2022-08-26 RX ORDER — FENTANYL CITRATE 50 UG/ML
25 INJECTION, SOLUTION INTRAMUSCULAR; INTRAVENOUS
Status: DISCONTINUED | OUTPATIENT
Start: 2022-08-26 | End: 2022-08-26 | Stop reason: HOSPADM

## 2022-08-26 RX ORDER — FENTANYL CITRATE 50 UG/ML
50 INJECTION, SOLUTION INTRAMUSCULAR; INTRAVENOUS
Status: DISCONTINUED | OUTPATIENT
Start: 2022-08-26 | End: 2022-08-26 | Stop reason: HOSPADM

## 2022-08-26 RX ORDER — PROPOFOL 10 MG/ML
VIAL (ML) INTRAVENOUS AS NEEDED
Status: DISCONTINUED | OUTPATIENT
Start: 2022-08-26 | End: 2022-08-26 | Stop reason: SURG

## 2022-08-26 RX ORDER — LIDOCAINE HYDROCHLORIDE 20 MG/ML
INJECTION, SOLUTION EPIDURAL; INFILTRATION; INTRACAUDAL; PERINEURAL AS NEEDED
Status: DISCONTINUED | OUTPATIENT
Start: 2022-08-26 | End: 2022-08-26 | Stop reason: SURG

## 2022-08-26 RX ORDER — HYDRALAZINE HYDROCHLORIDE 20 MG/ML
5 INJECTION INTRAMUSCULAR; INTRAVENOUS
Status: DISCONTINUED | OUTPATIENT
Start: 2022-08-26 | End: 2022-08-26 | Stop reason: HOSPADM

## 2022-08-26 RX ORDER — ROCURONIUM BROMIDE 10 MG/ML
INJECTION, SOLUTION INTRAVENOUS AS NEEDED
Status: DISCONTINUED | OUTPATIENT
Start: 2022-08-26 | End: 2022-08-26 | Stop reason: SURG

## 2022-08-26 RX ORDER — MEPERIDINE HYDROCHLORIDE 25 MG/ML
12.5 INJECTION INTRAMUSCULAR; INTRAVENOUS; SUBCUTANEOUS
Status: DISCONTINUED | OUTPATIENT
Start: 2022-08-26 | End: 2022-08-26 | Stop reason: HOSPADM

## 2022-08-26 RX ORDER — EPINEPHRINE 1 MG/ML
INJECTION, SOLUTION, CONCENTRATE INTRAVENOUS AS NEEDED
Status: DISCONTINUED | OUTPATIENT
Start: 2022-08-26 | End: 2022-08-26 | Stop reason: HOSPADM

## 2022-08-26 RX ORDER — MIDAZOLAM HYDROCHLORIDE 1 MG/ML
INJECTION INTRAMUSCULAR; INTRAVENOUS
Status: COMPLETED | OUTPATIENT
Start: 2022-08-26 | End: 2022-08-26

## 2022-08-26 RX ORDER — NALOXONE HCL 0.4 MG/ML
0.4 VIAL (ML) INJECTION AS NEEDED
Status: DISCONTINUED | OUTPATIENT
Start: 2022-08-26 | End: 2022-08-26 | Stop reason: HOSPADM

## 2022-08-26 RX ORDER — DEXAMETHASONE SODIUM PHOSPHATE 4 MG/ML
INJECTION, SOLUTION INTRA-ARTICULAR; INTRALESIONAL; INTRAMUSCULAR; INTRAVENOUS; SOFT TISSUE AS NEEDED
Status: DISCONTINUED | OUTPATIENT
Start: 2022-08-26 | End: 2022-08-26 | Stop reason: SURG

## 2022-08-26 RX ORDER — LABETALOL HYDROCHLORIDE 5 MG/ML
5 INJECTION, SOLUTION INTRAVENOUS
Status: DISCONTINUED | OUTPATIENT
Start: 2022-08-26 | End: 2022-08-26 | Stop reason: HOSPADM

## 2022-08-26 RX ORDER — ROPIVACAINE HYDROCHLORIDE 5 MG/ML
INJECTION, SOLUTION EPIDURAL; INFILTRATION; PERINEURAL
Status: COMPLETED | OUTPATIENT
Start: 2022-08-26 | End: 2022-08-26

## 2022-08-26 RX ORDER — FLUMAZENIL 0.1 MG/ML
0.2 INJECTION INTRAVENOUS AS NEEDED
Status: DISCONTINUED | OUTPATIENT
Start: 2022-08-26 | End: 2022-08-26 | Stop reason: HOSPADM

## 2022-08-26 RX ORDER — FENTANYL CITRATE 50 UG/ML
INJECTION, SOLUTION INTRAMUSCULAR; INTRAVENOUS
Status: COMPLETED | OUTPATIENT
Start: 2022-08-26 | End: 2022-08-26

## 2022-08-26 RX ORDER — DEXAMETHASONE SODIUM PHOSPHATE 4 MG/ML
INJECTION, SOLUTION INTRA-ARTICULAR; INTRALESIONAL; INTRAMUSCULAR; INTRAVENOUS; SOFT TISSUE
Status: COMPLETED | OUTPATIENT
Start: 2022-08-26 | End: 2022-08-26

## 2022-08-26 RX ORDER — HYDROCODONE BITARTRATE AND ACETAMINOPHEN 5; 325 MG/1; MG/1
1 TABLET ORAL ONCE AS NEEDED
Status: DISCONTINUED | OUTPATIENT
Start: 2022-08-26 | End: 2022-08-26 | Stop reason: HOSPADM

## 2022-08-26 RX ORDER — ONDANSETRON 2 MG/ML
4 INJECTION INTRAMUSCULAR; INTRAVENOUS ONCE AS NEEDED
Status: DISCONTINUED | OUTPATIENT
Start: 2022-08-26 | End: 2022-08-26 | Stop reason: HOSPADM

## 2022-08-26 RX ORDER — SODIUM CHLORIDE, SODIUM LACTATE, POTASSIUM CHLORIDE, CALCIUM CHLORIDE 600; 310; 30; 20 MG/100ML; MG/100ML; MG/100ML; MG/100ML
INJECTION, SOLUTION INTRAVENOUS CONTINUOUS PRN
Status: DISCONTINUED | OUTPATIENT
Start: 2022-08-26 | End: 2022-08-26 | Stop reason: SURG

## 2022-08-26 RX ORDER — ONDANSETRON 2 MG/ML
INJECTION INTRAMUSCULAR; INTRAVENOUS AS NEEDED
Status: DISCONTINUED | OUTPATIENT
Start: 2022-08-26 | End: 2022-08-26 | Stop reason: SURG

## 2022-08-26 RX ADMIN — PROPOFOL 150 MG: 10 INJECTION, EMULSION INTRAVENOUS at 10:56

## 2022-08-26 RX ADMIN — SUGAMMADEX 200 MG: 100 INJECTION, SOLUTION INTRAVENOUS at 10:44

## 2022-08-26 RX ADMIN — MIDAZOLAM 2 MG: 1 INJECTION INTRAMUSCULAR; INTRAVENOUS at 09:40

## 2022-08-26 RX ADMIN — DEXAMETHASONE SODIUM PHOSPHATE 4 MG: 4 INJECTION, SOLUTION INTRAMUSCULAR; INTRAVENOUS at 09:40

## 2022-08-26 RX ADMIN — FENTANYL CITRATE 100 MCG: 50 INJECTION, SOLUTION INTRAMUSCULAR; INTRAVENOUS at 09:40

## 2022-08-26 RX ADMIN — MEPERIDINE HYDROCHLORIDE 12.5 MG: 25 INJECTION INTRAMUSCULAR; INTRAVENOUS; SUBCUTANEOUS at 11:50

## 2022-08-26 RX ADMIN — ROPIVACAINE HYDROCHLORIDE 30 ML: 5 INJECTION EPIDURAL; INFILTRATION; PERINEURAL at 09:40

## 2022-08-26 RX ADMIN — LIDOCAINE HYDROCHLORIDE 100 MG: 20 INJECTION, SOLUTION EPIDURAL; INFILTRATION; INTRACAUDAL; PERINEURAL at 10:14

## 2022-08-26 RX ADMIN — ROCURONIUM BROMIDE 40 MG: 10 INJECTION, SOLUTION INTRAVENOUS at 10:14

## 2022-08-26 RX ADMIN — SODIUM CHLORIDE, SODIUM LACTATE, POTASSIUM CHLORIDE, AND CALCIUM CHLORIDE: .6; .31; .03; .02 INJECTION, SOLUTION INTRAVENOUS at 10:08

## 2022-08-26 RX ADMIN — PROPOFOL 200 MG: 10 INJECTION, EMULSION INTRAVENOUS at 10:14

## 2022-08-26 RX ADMIN — DEXAMETHASONE SODIUM PHOSPHATE 8 MG: 4 INJECTION, SOLUTION INTRA-ARTICULAR; INTRALESIONAL; INTRAMUSCULAR; INTRAVENOUS; SOFT TISSUE at 10:26

## 2022-08-26 RX ADMIN — ONDANSETRON 4 MG: 2 INJECTION INTRAMUSCULAR; INTRAVENOUS at 10:44

## 2022-08-26 RX ADMIN — CEFAZOLIN 2 G: 2 INJECTION, POWDER, FOR SOLUTION INTRAMUSCULAR; INTRAVENOUS at 10:19

## 2022-08-26 NOTE — ANESTHESIA PROCEDURE NOTES
Airway  Urgency: elective    Date/Time: 8/26/2022 10:16 AM  Airway not difficult    General Information and Staff    Patient location during procedure: OR  Anesthesiologist: Vito Block MD  CRNA/OBINNA: Venessa Randall CAA    Indications and Patient Condition  Indications for airway management: airway protection    Preoxygenated: yes  Mask difficulty assessment: 1 - vent by mask    Final Airway Details  Final airway type: endotracheal airway      Successful airway: ETT  Cuffed: yes   Successful intubation technique: direct laryngoscopy  Endotracheal tube insertion site: oral  Blade: Vitor  Blade size: 4  ETT size (mm): 7.5  Cormack-Lehane Classification: grade I - full view of glottis  Placement verified by: chest auscultation and capnometry   Measured from: teeth  ETT/EBT  to teeth (cm): 23  Number of attempts at approach: 1  Assessment: lips, teeth, and gum same as pre-op and atraumatic intubation               (Inserted Image. Unable to display)   February 11, 2020        YASSINE ARELLANO  [address]   [place], [place] [id number]        Dear YASSINE,      Thank you for selecting [facility] for your healthcare needs.    Our records indicate you are due for the following services:     Follow-up office visit     To schedule an appointment or if you have further questions, please contact your primary clinic:   [facility]       [phone]   [facility]       [phone]              [facility]     [phone]      Powered by [employer]    Sincerely,    Geovanna Green M.D.

## 2022-08-26 NOTE — ANESTHESIA POSTPROCEDURE EVALUATION
Patient: Andrea Dumont    Procedure Summary     Date: 08/26/22 Room / Location: [facility] OR 11 / [facility] MAIN OR    Anesthesia Start: 1008 Anesthesia Stop: 1117    Procedure: SHOULDER ARTHROSCOPY,  DISTAL CLAVICLE RESECTION,SUBACROMIAL DECOMPRESSION (Right Shoulder) Diagnosis:       Degenerative joint disease of right acromioclavicular joint      (Degenerative joint disease of right acromioclavicular joint [M19.011])    Surgeons: Caden Bain MD Provider: Vito Block MD    Anesthesia Type: general with block ASA Status: 3          Anesthesia Type: general with block    Vitals  Vitals Value Taken Time   /74 08/26/22 1203   Temp 97 °F (36.1 °C) 08/26/22 1200   Pulse 64 08/26/22 1204   Resp 14 08/26/22 1200   SpO2 98 % 08/26/22 1204   Vitals shown include unvalidated device data.        Post Anesthesia Care and Evaluation    Patient location during evaluation: PACU  Patient participation: complete - patient participated  Level of consciousness: awake  Pain scale: See nurse's notes for pain score.  Pain management: adequate    Airway patency: patent  Anesthetic complications: No anesthetic complications  PONV Status: none  Cardiovascular status: acceptable  Respiratory status: acceptable  Hydration status: acceptable    Comments: Patient seen and examined postoperatively; vital signs stable; SpO2 greater than or equal to 90%; cardiopulmonary status stable; nausea/vomiting adequately controlled; pain adequately controlled; no apparent anesthesia complications; patient discharged from anesthesia care when discharge criteria were met

## 2022-08-26 NOTE — ANESTHESIA PROCEDURE NOTES
Peripheral Block      Patient reassessed immediately prior to procedure    Patient location during procedure: pre-op  Start time: 8/26/2022 9:35 AM  Stop time: 8/26/2022 9:40 AM  Reason for block: procedure for pain, at surgeon's request, post-op pain management and secondary anesthetic  Performed by  Anesthesiologist: Vito Block MD  Preanesthetic Checklist  Completed: patient identified, IV checked, site marked, risks and benefits discussed, surgical consent, monitors and equipment checked, pre-op evaluation and timeout performed  Prep:  Pt Position: sitting  Sterile barriers:cap, gloves, mask and washed/disinfected hands  Prep: ChloraPrep  Patient monitoring: blood pressure monitoring, continuous pulse oximetry and EKG  Procedure    Sedation: yes  Performed under: local infiltration  Guidance:ultrasound guided and landmark technique    ULTRASOUND INTERPRETATION. Using ultrasound guidance a 20 G gauge needle was placed in close proximity to the nerve, at which point, under ultrasound guidance anesthetic was injected in the area of the nerve and spread of the anesthesia was seen on ultrasound in close proximity thereto.  There were no abnormalities seen on ultrasound; a digital image was taken; and the patient tolerated the procedure with no complications. Images:still images obtained, printed/placed on chart    Laterality:right  Block Type:supraclavicular  Injection Technique:single-shot  Needle Type:echogenic  Needle Gauge:20 G  Resistance on Injection: less than 15 psi  Sedation medications used: midazolam (VERSED) injection, 2 mg  fentaNYL citrate (PF) (SUBLIMAZE) injection, 100 mcg  Medications Used: dexamethasone (DECADRON) injection, 4 mg  ropivacaine (NAROPIN) 0.5 % injection, 30 mL  Med administered at 8/26/2022 9:40 AM      Medications  Comment:lisbet well    Post Assessment  Injection Assessment: negative aspiration for heme, no paresthesia on injection and incremental injection  Patient  Tolerance:comfortable throughout block  Complications:no  Additional Notes  Pre-procedure:  Peripheral nerve block performed preoperatively prior to the start of anesthesia time at the request of the patient and the surgeon for the management of postoperative acute surgical pain as well as for a secondary intraoperative anesthetic (general anesthesia is the primary intraoperative anesthetic); patient identified; pre-procedure vital signs, all relevant labs/studies, complete medical/surgical/anesthetic history, full medication list, full allergy list, and NPO status obtained/reviewed; physical assessment performed; anesthetic options, side effects, potential complications, risks, and benefits discussed; questions answered; patient wishes to proceed with the procedure; written anesthesia procedure consent obtained; patient cleared for procedure; time out performed; IV access in situ    Procedure:  ASA monitor placed; supplemental oxygen provided; patient positioned; hand hygiene performed; sterile technique maintained throughout the procedure; sterile prep applied; insertion site determined by anatomical landmarks, palpation, and ultrasound imaging; live ultrasound guidance throughout the procedure; target nerves/landmarks identified on live ultrasound; skin and subcutaneous tissues numbed by injection of 1% lidocaine; 4 inch 20G Ecoviate Ultra 360 Insulated Echogenic Needle used; realtime needle advancement and placement near the target nerves witnessed on live ultrasound; negative aspiration prior to injection; correct needle placement confirmed on live ultrasound by local anesthetic spread around the target nerves; local anesthetic mixture injected with negative aspiration prior to each injection and after each 1-5 mL injected; needle withdrawn; dressing applied; ultrasound image printed and placed in the patient's permanent medical record    Post-procedure:  Peripheral nerve block placed successfully; good block;  no apparent complications; minimal estimated blood loss; vital signs stable throughout; see nurse's notes for vitals; transported to the OR, general anesthesia induced, and surgery started

## 2022-08-26 NOTE — OP NOTE
SHOULDER ARTHROSCOPY DISTAL CLAVICLE RESECTION  Procedure Report    Patient Name:  Andrea Dumont  YOB: 1975    Date of Surgery:  8/26/2022     Indications: This is a 47 y.o. male with pain to the right shoulder.  Imaging demonstrated rotator cuff tear.Treatment options were discussed.  They desired to proceed with shoulder arthroscopy with rotator cuff repair after discussing the risks including bleeding, scarring, infection, stiffness, nerve damage, tendon damage, artery damage, continued pain, DVT, loss of life or limb, and a need for further surgery.      Pre-op Diagnosis:   Degenerative joint disease of right acromioclavicular joint [M19.011]       Post-op Diagnosis:    Same plus impingement    Procedure/CPT® Codes: 47929, 31248    Procedure(s):  SHOULDER ARTHROSCOPY,  DISTAL CLAVICLE RESECTION,SUBACROMIAL DECOMPRESSION    Assistant: [place] physician assistant    was responsible for performing the following activities: Retraction, Suction, Irrigation, Suturing, Closing and Placing Dressing and their skilled assistance was necessary for the success of this case.         Anesthesia: General with Block    IV fluids: See anesthesia record    Estimated Blood Loss: minimal    Implants:    Nothing was implanted during the procedure      Complications: None right    Specimens:none    Description of Procedure: The patient's operative site was marked.  Regional anesthesia was administered.  They were brought to the operating room and placed  on the operating room table.  General anesthesia was administered. Antibiotics were dosed.  A timeout was taken, confirming the correct operative site and procedure.  Exam under anesthesia indicated full range of motion and no instability.  They were placed in a semilateral position.  An axillary roll and SCDs were placed.  The right shoulder was prepped and draped in the standard surgical fashion.  The shoulder was injected with local anesthetic and was  placed into traction.  A posterior portal was created.  A camera was inserted.  The glenoid chondral surface was intact.  The humerus surface was intact.  The subscapularis was intact.  The biceps was intact.  The labrum was intact.  The undersurface of the cuff demonstrated no tearing. A shaver was inserted.  The labrum probed and intact.  The biceps was pulled into the shoulder and found to be intact.  The axillary pouch was free of synovitis or loose bodies. The instruments were placed in the subacromial space.  A full-thickness bursectomy was performed.  The CA ligament was intact.  No impingement was noted.  Dorsal surface of the cuff was intact.    Distal clavicle was exposed preserving the posterior and superior capsular attachments some overlying impingement from the acromion was removed with a bur to complete the acromioplasty and approximate 7 8 mm of distal clavicle resected to bleeding bone.  .  The wounds were closed with suture and Steri-Strips.     a sterile dressing was applied to the rest of the shoulder.  They were placed in a sling and taken to the recovery room.  There were no complications.  I was present for all portions.  All counts were correct.  Good capillary refill was noted to the hand.      Caden Bain MD     Date: 8/26/2022  Time: 10:59 EDT

## 2022-08-29 ENCOUNTER — OFFICE VISIT (OUTPATIENT)
Dept: ORTHOPEDIC SURGERY | Facility: CLINIC | Age: 47
End: 2022-08-29

## 2022-08-29 VITALS — WEIGHT: 215 LBS | BODY MASS INDEX: 30.1 KG/M2 | HEIGHT: 71 IN | HEART RATE: 80 BPM

## 2022-08-29 DIAGNOSIS — Z47.89 ORTHOPEDIC AFTERCARE: Primary | ICD-10-CM

## 2022-08-29 PROCEDURE — 99024 POSTOP FOLLOW-UP VISIT: CPT | Performed by: ORTHOPAEDIC SURGERY

## 2022-08-29 NOTE — PROGRESS NOTES
"     Patient ID: Andrea Dumont is a 47 y.o. male.    8/26/22 right shoulder arthroscopy distal clavicle excision  Pain controlled  Objective:    Pulse 80   Ht 180.3 cm (71\")   Wt 97.5 kg (215 lb)   BMI 29.99 kg/m²     Physical Examination:  Wounds are well approximated without infection.  Sensory and motor exam are intact in all distributions. Radial pulse is palpable and capillary refill is less than two seconds to all digits.       Imaging:      Assessment:  Doing well after distal clavicle excision    Plan:  Wounds were cleaned and redressed. Restrictions discussed.  Begin therapy and see me in 4 weeks.  Remove dressings in two weeks.  "

## 2022-08-29 NOTE — PATIENT INSTRUCTIONS
Shoulder Arthroscopy: Post- Operative Visit Objectives    Review the operative findings, procedures and photos.  Make sure medications are effective and not causing problems.  Pain: Oxycodone or hydrocodone is for pain. You may take 1 tablet every 6 hours as necessary.  Some patients don’t require the use of these…in those circumstances just use Tylenol extra-strength 1 or 2 tablets every 4-6 hours.   Naproxen 500 mg. For pain and inflammation.  You should take 1 every twice a day.  Do not use Aleve, Ibuprofen, Motrin or Advil during this time.  If you have had any problems stop taking these medicines and please tell us!  Keflex (cephalexin). This is an antibiotic to be taken as a preventive medicine.  Take 1 pill every 6 hours for 1 day. If you have a penicillin allergy this will be replaced by other options.  Wound Care:  Today we will change your dressings and cover your wounds with a plastic covering called Tegaderm. This will allow you to shower immediately.  Remove the tegaderm and underlying dressings in two weeks then ok to get wet in a shower. No Baths or swimming until 4 weeks after surgery.  Keep a towel on the dressing while applying ice.  Exercises and Physical Therapy Remember the protocol is 3 phases:  Healing (6 wks)  Continue the use of the sling for 6 weeks; depending on procedure utilized this may be shorter. You can do gentle range of motion exercise of the elbow and wrist immediately with the arm at the side.  Formal physical therapy will usually start in two weeks.    Rehabilitative (6 wks) You begin a more aggressive phase of physical therapy at the 6 week eduarda. Light strengthening and a continued emphasis on protecting the repair are important at this stage.  Restorative (4 wks)  Back to your sports and job activities gradually   Follow Up appointments Schedule Follow up visits as directed usually in 4 weeks. Physical therapy will be ordered today and you should receive a phone call or can  schedule yourself if appropriate.  Notes  Make sure you have all necessary notes and documentation for school or work.  Issues: Remember our goal is to make this process smooth and easy if there is any thing you need please ask us or call back [phone]

## 2022-09-02 DIAGNOSIS — M19.011 DEGENERATIVE JOINT DISEASE OF RIGHT ACROMIOCLAVICULAR JOINT: Primary | ICD-10-CM

## 2022-09-02 RX ORDER — OXYCODONE HYDROCHLORIDE AND ACETAMINOPHEN 5; 325 MG/1; MG/1
1 TABLET ORAL EVERY 6 HOURS PRN
Qty: 28 TABLET | Refills: 0 | Status: SHIPPED | OUTPATIENT
Start: 2022-09-02 | End: 2022-09-08

## 2022-09-08 ENCOUNTER — OFFICE VISIT (OUTPATIENT)
Dept: PAIN MEDICINE | Facility: CLINIC | Age: 47
End: 2022-09-08

## 2022-09-08 VITALS
HEART RATE: 76 BPM | SYSTOLIC BLOOD PRESSURE: 132 MMHG | RESPIRATION RATE: 16 BRPM | DIASTOLIC BLOOD PRESSURE: 80 MMHG | OXYGEN SATURATION: 97 %

## 2022-09-08 DIAGNOSIS — Z79.899 HIGH RISK MEDICATION USE: ICD-10-CM

## 2022-09-08 DIAGNOSIS — Z79.899 HIGH RISK MEDICATION USE: Primary | ICD-10-CM

## 2022-09-08 DIAGNOSIS — M79.2 NEUROPATHIC PAIN: ICD-10-CM

## 2022-09-08 DIAGNOSIS — G89.4 CHRONIC PAIN SYNDROME: Primary | ICD-10-CM

## 2022-09-08 DIAGNOSIS — M25.511 ACUTE PAIN OF RIGHT SHOULDER: ICD-10-CM

## 2022-09-08 DIAGNOSIS — G35 MULTIPLE SCLEROSIS: ICD-10-CM

## 2022-09-08 PROCEDURE — 99214 OFFICE O/P EST MOD 30 MIN: CPT | Performed by: ANESTHESIOLOGY

## 2022-09-08 RX ORDER — HYDROCODONE BITARTRATE AND ACETAMINOPHEN 7.5; 325 MG/1; MG/1
1 TABLET ORAL 3 TIMES DAILY PRN
Qty: 90 TABLET | Refills: 0 | Status: SHIPPED | OUTPATIENT
Start: 2022-09-08 | End: 2022-11-02 | Stop reason: SDUPTHER

## 2022-09-08 RX ORDER — HYDROCODONE BITARTRATE AND ACETAMINOPHEN 7.5; 325 MG/1; MG/1
1 TABLET ORAL 3 TIMES DAILY PRN
Qty: 90 TABLET | Refills: 0 | Status: SHIPPED | OUTPATIENT
Start: 2022-10-07 | End: 2022-11-02 | Stop reason: SDUPTHER

## 2022-09-08 RX ORDER — HYDROCODONE BITARTRATE AND ACETAMINOPHEN 7.5; 325 MG/1; MG/1
1 TABLET ORAL EVERY 8 HOURS PRN
COMMUNITY
End: 2022-09-08

## 2022-09-08 NOTE — PROGRESS NOTES
Subjective    CC bilateral feet and leg pain.  Andrea Dumont is a 47 y.o. male with multiple sclerosis, peripheral neuropathy here for follow up.  S/P right shoulder arthroscopic surgery/Abeln, was prescribed oxycodone postop.  Recovering well.  EMG/NCS bilateral lower extremity within normal limit.  Continues to follow with neurology.  Chronic bilateral feet and lower leg neuropathic pain and myofascial pain constant but worse with activity.  Denies new weakness saddle anesthesia bladder bowel continence.  Chronic axial back pain without radicular pain.  Chronic polyarthralgia.  Sees neurology for MS  Pain interfering with daily activity work and sleep.  Tried gabapentin did not tolerate.  Currently utilizes Lyrica but can only tolerate once a day 75 mg.  Prescribed by PCP.  Cannot take NSAIDs due to polycystic kidney disease    L-spine and T-spine MRI 2022 Probable old demyelinating plaque in the posterior thoracic cord at the T8 level with no abnormal cervical or thoracic cord enhancement to suggest active demyelination. No new demyelinating plaque is identified. At C3-C4, left greater than right uncinate process hypertrophy results in mild to moderate left neural foraminal narrowing.  Otherwise, no significant spinal canal stenosis or neural foraminal narrowing.    Pain Assessment   Location of Pain:  ana Leg, joint, back  Description of Pain: Dull/Aching, Throbbing, Stabbing  Previous Pain Rating :6  Current Pain Ratin  Aggravating Factors: Activity  Alleviating Factors: Rest, Medication    PEG Assessment   What number best describes your pain on average in the past week?3  What number best describes how, during the past week, pain has interfered with your enjoyment of life?3  What number best describes how, during the past week, pain has interfered with your general activity?7     The following portions of the patient's history were reviewed and updated as appropriate: allergies, current  medications, past family history, past medical history, past social history, past surgical history and problem list.     has a past medical history of Arthritis, COVID-19, Guillain Barré syndrome (HCC), Hand numbness, High blood pressure, Leg pain, Multiple sclerosis (HCC), Pain in both feet, Pneumonia, Polycystic kidney disease, and Right shoulder pain (2022).   has a past surgical history that includes Vasectomy; Cyst Removal; Shoulder surgery (Left, 2018); and Shoulder arthroscopy (Right, 2022).  family history includes Cancer in an other family member; Dementia in his mother; Diabetes in an other family member; Hyperlipidemia in his father; Transient ischemic attack in his father.  Social History     Tobacco Use   • Smoking status: Former Smoker     Quit date: 2010     Years since quittin.6   • Smokeless tobacco: Never Used   Substance Use Topics   • Alcohol use: Not Currently     Comment: occ       Review of Systems   Musculoskeletal: Positive for arthralgias, back pain and myalgias.   All other systems reviewed and are negative.    Objective   Physical Exam   Constitutional: No distress.   Pulmonary/Chest: Effort normal.   Neurological: A sensory deficit is present.   Vitals reviewed.    PHQ 9 on chart  Opioid risk tool moderate risk (age)    Assessment & Plan    Diagnoses and all orders for this visit:    1. Chronic pain syndrome (Primary)  -     HYDROcodone-acetaminophen (NORCO) 7.5-325 MG per tablet; Take 1 tablet by mouth 3 (Three) Times a Day As Needed for Severe Pain.  Dispense: 90 tablet; Refill: 0  -     HYDROcodone-acetaminophen (NORCO) 7.5-325 MG per tablet; Take 1 tablet by mouth 3 (Three) Times a Day As Needed for Severe Pain. DNF before 10/7/2022  Dispense: 90 tablet; Refill: 0    2. Multiple sclerosis (HCC)    3. Neuropathic pain    4. Acute pain of right shoulder    5. High risk medication use    Summary  Andrea Dumont is a 47 y.o. male with multiple sclerosis,  peripheral neuropathy here for f/u.   Chronic bilateral lower extremity neuropathic pain/myofascial pain related to MS.  Fitting with ADL and work.  Cannot take NSAIDs due to polycystic kidney disease.    S/P right shoulder arthroscopic surgery/Abeln, was prescribed oxycodone postop.  Recovering well.  EMG/NCS bilateral lower extremity within normal limit.  Continues to follow with neurology.    Continue hydrocodone 7.5/325 3 daily as needed for severe pain.#75  UDS and Inspect reviewed.   Discussed risk of tolerance, dependence, respiratory depression, coma and death associated with use of oral opioids for treatment of chronic nonmalignant pain.     RTC 2  month

## 2022-11-02 ENCOUNTER — OFFICE VISIT (OUTPATIENT)
Dept: PAIN MEDICINE | Facility: CLINIC | Age: 47
End: 2022-11-02

## 2022-11-02 VITALS
DIASTOLIC BLOOD PRESSURE: 85 MMHG | HEART RATE: 75 BPM | RESPIRATION RATE: 16 BRPM | SYSTOLIC BLOOD PRESSURE: 128 MMHG | OXYGEN SATURATION: 97 %

## 2022-11-02 DIAGNOSIS — M79.2 NEUROPATHIC PAIN: ICD-10-CM

## 2022-11-02 DIAGNOSIS — G89.4 CHRONIC PAIN SYNDROME: Primary | ICD-10-CM

## 2022-11-02 DIAGNOSIS — G35 MULTIPLE SCLEROSIS: ICD-10-CM

## 2022-11-02 DIAGNOSIS — Z79.899 HIGH RISK MEDICATION USE: ICD-10-CM

## 2022-11-02 DIAGNOSIS — M79.18 MYOFASCIAL PAIN SYNDROME: ICD-10-CM

## 2022-11-02 PROCEDURE — 99214 OFFICE O/P EST MOD 30 MIN: CPT | Performed by: ANESTHESIOLOGY

## 2022-11-02 RX ORDER — HYDROCODONE BITARTRATE AND ACETAMINOPHEN 7.5; 325 MG/1; MG/1
1 TABLET ORAL 3 TIMES DAILY PRN
Qty: 90 TABLET | Refills: 0 | Status: SHIPPED | OUTPATIENT
Start: 2022-12-05 | End: 2022-12-21 | Stop reason: SDUPTHER

## 2022-11-02 RX ORDER — HYDROCODONE BITARTRATE AND ACETAMINOPHEN 7.5; 325 MG/1; MG/1
1 TABLET ORAL 3 TIMES DAILY PRN
Qty: 90 TABLET | Refills: 0 | Status: SHIPPED | OUTPATIENT
Start: 2022-11-02 | End: 2022-12-21 | Stop reason: SDUPTHER

## 2022-11-02 NOTE — PROGRESS NOTES
Subjective    CC bilateral feet and leg pain.  Andrea Dumont is a 47 y.o. male with multiple sclerosis, peripheral neuropathy here for follow up.  Denies new complaint except for the last 24 hours feels like he is having double vision in his right eye.  Has appointment with neurologist this afternoon.  EMG/NCS bilateral lower extremity within normal limit.  Continues to follow with neurology.  Chronic bilateral feet and lower leg neuropathic pain and myofascial pain constant but worse with activity.  Denies new weakness saddle anesthesia bladder bowel continence.  Chronic axial back pain without radicular pain.  Chronic polyarthralgia.  Sees neurology for MS  Pain interfering with daily activity work and sleep.  Tried gabapentin did not tolerate.  Currently utilizes Lyrica but can only tolerate once a day 75 mg.  Prescribed by PCP.  Cannot take NSAIDs due to polycystic kidney disease    L-spine and T-spine MRI 2022 Probable old demyelinating plaque in the posterior thoracic cord at the T8 level with no abnormal cervical or thoracic cord enhancement to suggest active demyelination. No new demyelinating plaque is identified. At C3-C4, left greater than right uncinate process hypertrophy results in mild to moderate left neural foraminal narrowing.  Otherwise, no significant spinal canal stenosis or neural foraminal narrowing.    Pain Assessment   Location of Pain:  ana Leg, joint, back  Description of Pain: Dull/Aching, Throbbing, Stabbing  Previous Pain Rating :6  Current Pain Ratin  Aggravating Factors: Activity  Alleviating Factors: Rest, Medication    PEG Assessment   What number best describes your pain on average in the past week?3  What number best describes how, during the past week, pain has interfered with your enjoyment of life?3  What number best describes how, during the past week, pain has interfered with your general activity?7     The following portions of the patient's history were reviewed  and updated as appropriate: allergies, current medications, past family history, past medical history, past social history, past surgical history and problem list.     has a past medical history of Arthritis, COVID-19, Guillain Barré syndrome (HCC), Hand numbness, High blood pressure, Leg pain, Multiple sclerosis (HCC), Pain in both feet, Pneumonia, Polycystic kidney disease, and Right shoulder pain (2022).   has a past surgical history that includes Vasectomy; Cyst Removal; Shoulder surgery (Left, 2018); and Shoulder arthroscopy (Right, 2022).  family history includes Cancer in an other family member; Dementia in his mother; Diabetes in an other family member; Hyperlipidemia in his father; Transient ischemic attack in his father.  Social History     Tobacco Use   • Smoking status: Former     Types: Cigarettes     Quit date: 2010     Years since quittin.8   • Smokeless tobacco: Never   Substance Use Topics   • Alcohol use: Not Currently     Comment: occ       Review of Systems   Musculoskeletal: Positive for arthralgias, back pain and myalgias.   All other systems reviewed and are negative.    Objective   Physical Exam   Constitutional: No distress.   Pulmonary/Chest: Effort normal.   Neurological: A sensory deficit is present.   Vitals reviewed.    PHQ 9 on chart  Opioid risk tool moderate risk (age)    Assessment & Plan    Diagnoses and all orders for this visit:    1. Chronic pain syndrome (Primary)  -     HYDROcodone-acetaminophen (NORCO) 7.5-325 MG per tablet; Take 1 tablet by mouth 3 (Three) Times a Day As Needed for Severe Pain.  Dispense: 90 tablet; Refill: 0  -     HYDROcodone-acetaminophen (NORCO) 7.5-325 MG per tablet; Take 1 tablet by mouth 3 (Three) Times a Day As Needed for Severe Pain. DNF before 2022  Dispense: 90 tablet; Refill: 0    2. Multiple sclerosis (HCC)    3. Neuropathic pain    4. Myofascial pain syndrome    5. High risk medication use    Summary  Andrea  Tim is a 47 y.o. male with multiple sclerosis, peripheral neuropathy here for f/u.   Chronic bilateral lower extremity neuropathic pain/myofascial pain related to MS.  Fitting with ADL and work.  Cannot take NSAIDs due to polycystic kidney disease.  EMG/NCS bilateral lower extremity within normal limit.  Continues to follow with neurology.    Denies new complaint except for the last 24 hours feels like he is having double vision in his right eye.  Has appointment with neurologist this afternoon.    Continue hydrocodone 7.5/325 3 daily as needed for severe pain.#75  UDS and Inspect reviewed.   Discussed risk of tolerance, dependence, respiratory depression, coma and death associated with use of oral opioids for treatment of chronic nonmalignant pain.     RTC 2  month

## 2022-12-04 ENCOUNTER — HOSPITAL ENCOUNTER (OUTPATIENT)
Facility: HOSPITAL | Age: 47
Discharge: HOME | End: 2022-12-04
Attending: EMERGENCY MEDICINE

## 2022-12-04 VITALS
TEMPERATURE: 100.2 F | RESPIRATION RATE: 16 BRPM | DIASTOLIC BLOOD PRESSURE: 88 MMHG | OXYGEN SATURATION: 98 % | HEART RATE: 89 BPM | SYSTOLIC BLOOD PRESSURE: 149 MMHG | BODY MASS INDEX: 30.1 KG/M2 | HEIGHT: 71 IN | WEIGHT: 215 LBS

## 2022-12-04 DIAGNOSIS — J06.9 VIRAL UPPER RESPIRATORY TRACT INFECTION WITH COUGH: Primary | ICD-10-CM

## 2022-12-04 LAB
FLUAV SUBTYP SPEC NAA+PROBE: NOT DETECTED
FLUBV RNA ISLT QL NAA+PROBE: NOT DETECTED
SARS-COV-2 RNA RESP QL NAA+PROBE: NOT DETECTED

## 2022-12-04 PROCEDURE — 99203 OFFICE O/P NEW LOW 30 MIN: CPT | Performed by: NURSE PRACTITIONER

## 2022-12-04 PROCEDURE — 87636 SARSCOV2 & INF A&B AMP PRB: CPT | Performed by: NURSE PRACTITIONER

## 2022-12-04 PROCEDURE — EDLOS: Performed by: NURSE PRACTITIONER

## 2022-12-04 PROCEDURE — G0463 HOSPITAL OUTPT CLINIC VISIT: HCPCS | Performed by: NURSE PRACTITIONER

## 2022-12-04 PROCEDURE — 87636 SARSCOV2 & INF A&B AMP PRB: CPT

## 2022-12-04 RX ORDER — BENZONATATE 200 MG/1
200 CAPSULE ORAL 3 TIMES DAILY PRN
Qty: 12 CAPSULE | Refills: 0 | Status: SHIPPED | OUTPATIENT
Start: 2022-12-04 | End: 2022-12-08

## 2022-12-04 NOTE — DISCHARGE INSTRUCTIONS
Follow-up with primary care for further evaluation and treatment    Medications as prescribed    Make sure you are drinking plenty of fluids

## 2022-12-04 NOTE — FSED PROVIDER NOTE
Subjective   History of Present Illness  Patient is a 47-year-old male who presents to the ER with a cough and flu like symptoms that started last night. Patient wanting something to help with cough.     History provided by:  Patient      Review of Systems   Respiratory: Positive for cough.        Past Medical History:   Diagnosis Date   • Arthritis    • COVID-19     2021   • Guillain Barré syndrome (HCC)    • Hand numbness     left-- cramping   • High blood pressure    • Leg pain    • Multiple sclerosis (HCC)    • Pain in both feet    • Pneumonia      with covid 2021   • Polycystic kidney disease    • Right shoulder pain 2022       Allergies   Allergen Reactions   • Codeine Hives   • Nsaids Other (See Comments)     Polycystic Kidney disease-- sees nephrologist   • Tramadol Hives       Past Surgical History:   Procedure Laterality Date   • CYST REMOVAL      on left wrist   • SHOULDER ARTHROSCOPY Right 2022    Procedure: SHOULDER ARTHROSCOPY,  DISTAL CLAVICLE RESECTION,SUBACROMIAL DECOMPRESSION;  Surgeon: Caden Bain MD;  Location: [facility] OR;  Service: Orthopedics;  Laterality: Right;   • SHOULDER SURGERY Left 2018    LEFT SHOULDER SCOPE   • VASECTOMY         Family History   Problem Relation Age of Onset   • Dementia Mother    • Hyperlipidemia Father    • Transient ischemic attack Father    • Cancer Other    • Diabetes Other        Social History     Socioeconomic History   • Marital status: Legally    Tobacco Use   • Smoking status: Former     Types: Cigarettes     Quit date: 2010     Years since quittin.9   • Smokeless tobacco: Never   Vaping Use   • Vaping Use: Never used   Substance and Sexual Activity   • Alcohol use: Not Currently     Comment: occ   • Drug use: No   • Sexual activity: Defer           Objective   Physical Exam  Vitals and nursing note reviewed.   Constitutional:       Appearance: Normal appearance.   HENT:      Mouth/Throat:      Mouth:  Mucous membranes are moist.   Eyes:      Pupils: Pupils are equal, round, and reactive to light.   Cardiovascular:      Rate and Rhythm: Normal rate and regular rhythm.   Pulmonary:      Effort: Pulmonary effort is normal.      Breath sounds: Normal breath sounds.      Comments: Patient with complaints of cough, has tried Delsym and Robitussin without relief.   Skin:     General: Skin is warm and dry.   Neurological:      General: No focal deficit present.      Mental Status: He is alert and oriented to person, place, and time.   Psychiatric:         Mood and Affect: Mood normal.         Behavior: Behavior normal. Behavior is cooperative.         Procedures           ED Course                                           MDM  Number of Diagnoses or Management Options  Viral upper respiratory tract infection with cough: new and requires workup     Amount and/or Complexity of Data Reviewed  Clinical lab tests: reviewed    Risk of Complications, Morbidity, and/or Mortality  Presenting problems: minimal  Diagnostic procedures: minimal  Management options: minimal    Patient Progress  Patient progress: stable      Final diagnoses:   Viral upper respiratory tract infection with cough       ED Disposition  ED Disposition     ED Disposition   Discharge    Condition   Stable    Comment   --             Sherrell Ramos, APRN  [address] [facility] [place] [id number] [phone]    In 1 week  As needed, If symptoms worsen         Medication List      New Prescriptions    benzonatate 200 MG capsule  Commonly known as: TESSALON  Take 1 capsule by mouth 3 (Three) Times a Day As Needed for Cough for up to 4 days.           Where to Get Your Medications      You can get these medications from any pharmacy    Bring a paper prescription for each of these medications  · benzonatate 200 MG capsule

## 2022-12-21 ENCOUNTER — OFFICE VISIT (OUTPATIENT)
Dept: PAIN MEDICINE | Facility: CLINIC | Age: 47
End: 2022-12-21

## 2022-12-21 VITALS
OXYGEN SATURATION: 96 % | RESPIRATION RATE: 16 BRPM | HEART RATE: 79 BPM | DIASTOLIC BLOOD PRESSURE: 79 MMHG | SYSTOLIC BLOOD PRESSURE: 132 MMHG

## 2022-12-21 DIAGNOSIS — M79.2 NEUROPATHIC PAIN: ICD-10-CM

## 2022-12-21 DIAGNOSIS — M79.18 MYOFASCIAL PAIN SYNDROME: ICD-10-CM

## 2022-12-21 DIAGNOSIS — G89.4 CHRONIC PAIN SYNDROME: ICD-10-CM

## 2022-12-21 DIAGNOSIS — G35 MULTIPLE SCLEROSIS: ICD-10-CM

## 2022-12-21 DIAGNOSIS — Z79.899 HIGH RISK MEDICATION USE: Primary | ICD-10-CM

## 2022-12-21 PROCEDURE — 99214 OFFICE O/P EST MOD 30 MIN: CPT | Performed by: ANESTHESIOLOGY

## 2022-12-21 RX ORDER — ERGOCALCIFEROL 1.25 MG/1
50000 CAPSULE ORAL WEEKLY
COMMUNITY
Start: 2022-12-09

## 2022-12-21 RX ORDER — BUPROPION HYDROCHLORIDE 150 MG/1
150 TABLET ORAL DAILY
COMMUNITY
Start: 2022-12-07

## 2022-12-21 RX ORDER — HYDROCODONE BITARTRATE AND ACETAMINOPHEN 7.5; 325 MG/1; MG/1
1 TABLET ORAL 3 TIMES DAILY PRN
Qty: 90 TABLET | Refills: 0 | Status: SHIPPED | OUTPATIENT
Start: 2023-01-04 | End: 2022-12-28 | Stop reason: SDUPTHER

## 2022-12-21 RX ORDER — HYDROCODONE BITARTRATE AND ACETAMINOPHEN 7.5; 325 MG/1; MG/1
1 TABLET ORAL 3 TIMES DAILY PRN
Qty: 90 TABLET | Refills: 0 | Status: SHIPPED | OUTPATIENT
Start: 2023-02-03 | End: 2023-02-02 | Stop reason: SDUPTHER

## 2022-12-21 RX ORDER — GLATIRAMER ACETATE 20 MG/ML
INJECTION, SOLUTION SUBCUTANEOUS
COMMUNITY

## 2022-12-21 NOTE — PROGRESS NOTES
Subjective    CC bilateral feet and leg pain.  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  Seen neurology for double vision diagnosed with optic neuritis given oral steroids with significant improvement.  Chronic bilateral feet and lower leg neuropathic pain and myofascial pain constant but worse with activity.  Denies new weakness saddle anesthesia bladder bowel continence.  Chronic axial back pain without radicular pain.  Chronic polyarthralgia.  EMG/NCS bilateral lower extremity within normal limit.  Sees neurology for MS  Pain interfering with daily activity work and sleep.  Tried gabapentin did not tolerate.  Currently utilizes Lyrica but can only tolerate once a day 75 mg.  Prescribed by PCP.  Cannot take NSAIDs due to polycystic kidney disease    L-spine and T-spine MRI 2022 Probable old demyelinating plaque in the posterior thoracic cord at the T8 level with no abnormal cervical or thoracic cord enhancement to suggest active demyelination. No new demyelinating plaque is identified. At C3-C4, left greater than right uncinate process hypertrophy results in mild to moderate left neural foraminal narrowing.  Otherwise, no significant spinal canal stenosis or neural foraminal narrowing.    Pain Assessment   Location of Pain:  ana Leg, joint, back  Description of Pain: Dull/Aching, Throbbing, Stabbing  Previous Pain Rating :5  Current Pain Ratin  Aggravating Factors: Activity  Alleviating Factors: Rest, Medication    PEG Assessment   What number best describes your pain on average in the past week?3  What number best describes how, during the past week, pain has interfered with your enjoyment of life?4  What number best describes how, during the past week, pain has interfered with your general activity?7     The following portions of the patient's history were reviewed and updated as appropriate: allergies, current medications, past  family history, past medical history, past social history, past surgical history and problem list.     has a past medical history of Arthritis, COVID-19, Depression, Guillain Barré syndrome (HCC), Hand numbness, High blood pressure, Leg pain, Multiple sclerosis (HCC), Optic neuritis, Pain in both feet, Pneumonia, Polycystic kidney disease, and Right shoulder pain (2022).   has a past surgical history that includes Vasectomy; Cyst Removal; Shoulder surgery (Left, 2018); and Shoulder arthroscopy (Right, 2022).  family history includes Cancer in an other family member; Dementia in his mother; Diabetes in an other family member; Hyperlipidemia in his father; Transient ischemic attack in his father.  Social History     Tobacco Use   • Smoking status: Former     Types: Cigarettes     Quit date: 2010     Years since quittin.9   • Smokeless tobacco: Never   Substance Use Topics   • Alcohol use: Not Currently     Comment: occ       Review of Systems   Musculoskeletal: Positive for arthralgias, back pain and myalgias.   All other systems reviewed and are negative.    Objective   Physical Exam   Constitutional: No distress.   Pulmonary/Chest: Effort normal.   Neurological: A sensory deficit is present.   Vitals reviewed.    PHQ 9 on chart  Opioid risk tool moderate risk (age)    Assessment & Plan    Diagnoses and all orders for this visit:    1. Chronic pain syndrome (Primary)  -     HYDROcodone-acetaminophen (NORCO) 7.5-325 MG per tablet; Take 1 tablet by mouth 3 (Three) Times a Day As Needed for Severe Pain.  Dispense: 90 tablet; Refill: 0  -     HYDROcodone-acetaminophen (NORCO) 7.5-325 MG per tablet; Take 1 tablet by mouth 3 (Three) Times a Day As Needed for Severe Pain. DNF before 2/3/2023  Dispense: 90 tablet; Refill: 0    2. Multiple sclerosis (HCC)    3. Neuropathic pain    4. Myofascial pain syndrome    5. High risk medication use    Summary  Andrea Dumont is a 47 y.o. male with multiple  sclerosis, peripheral neuropathy here for f/u.   Chronic bilateral lower extremity neuropathic pain/myofascial pain related to MS.  Fitting with ADL and work.  Cannot take NSAIDs due to polycystic kidney disease.  EMG/NCS bilateral lower extremity within normal limit.  Continues to follow with neurology.    Seen neurology for double vision diagnosed with optic neuritis given oral steroids with significant improvement.  Otherwise denies any new complaints today.  Continues to have good relief and functional benefit with hydrocodone.    Continue hydrocodone 7.5/325 3 daily as needed for severe pain.  UDS and Inspect reviewed.   Discussed risk of tolerance, dependence, respiratory depression, coma and death associated with use of oral opioids for treatment of chronic nonmalignant pain.     RTC 2  month

## 2022-12-28 ENCOUNTER — TELEPHONE (OUTPATIENT)
Dept: PAIN MEDICINE | Facility: CLINIC | Age: 47
End: 2022-12-28

## 2022-12-28 DIAGNOSIS — G89.4 CHRONIC PAIN SYNDROME: ICD-10-CM

## 2022-12-28 RX ORDER — HYDROCODONE BITARTRATE AND ACETAMINOPHEN 7.5; 325 MG/1; MG/1
1 TABLET ORAL 3 TIMES DAILY PRN
Qty: 90 TABLET | Refills: 0 | Status: SHIPPED | OUTPATIENT
Start: 2023-01-02 | End: 2023-03-01 | Stop reason: SDUPTHER

## 2022-12-28 NOTE — TELEPHONE ENCOUNTER
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

## 2023-02-02 ENCOUNTER — TELEPHONE (OUTPATIENT)
Dept: PAIN MEDICINE | Facility: CLINIC | Age: 48
End: 2023-02-02
Payer: COMMERCIAL

## 2023-02-02 DIAGNOSIS — G89.4 CHRONIC PAIN SYNDROME: ICD-10-CM

## 2023-02-02 RX ORDER — HYDROCODONE BITARTRATE AND ACETAMINOPHEN 7.5; 325 MG/1; MG/1
1 TABLET ORAL 3 TIMES DAILY PRN
Qty: 90 TABLET | Refills: 0 | Status: SHIPPED | OUTPATIENT
Start: 2023-02-02 | End: 2023-03-01 | Stop reason: SDUPTHER

## 2023-02-02 NOTE — TELEPHONE ENCOUNTER
2/2/23-- Dr ROSA-- pt called left voice message is leaving out of town tonight -- was wanting to know if you could send in his refill on  Hydrocodone to [facility] to fill today instead of tomorrow-- his call back number is [phone]

## 2023-03-01 ENCOUNTER — OFFICE VISIT (OUTPATIENT)
Dept: PAIN MEDICINE | Facility: CLINIC | Age: 48
End: 2023-03-01
Payer: COMMERCIAL

## 2023-03-01 VITALS
SYSTOLIC BLOOD PRESSURE: 126 MMHG | DIASTOLIC BLOOD PRESSURE: 83 MMHG | OXYGEN SATURATION: 97 % | HEART RATE: 83 BPM | RESPIRATION RATE: 16 BRPM

## 2023-03-01 DIAGNOSIS — M79.2 NEUROPATHIC PAIN: ICD-10-CM

## 2023-03-01 DIAGNOSIS — G89.4 CHRONIC PAIN SYNDROME: Primary | ICD-10-CM

## 2023-03-01 DIAGNOSIS — G35 MULTIPLE SCLEROSIS: ICD-10-CM

## 2023-03-01 DIAGNOSIS — Z79.899 HIGH RISK MEDICATION USE: ICD-10-CM

## 2023-03-01 DIAGNOSIS — M79.18 MYOFASCIAL PAIN SYNDROME: ICD-10-CM

## 2023-03-01 PROCEDURE — 99214 OFFICE O/P EST MOD 30 MIN: CPT | Performed by: ANESTHESIOLOGY

## 2023-03-01 RX ORDER — HYDROCODONE BITARTRATE AND ACETAMINOPHEN 7.5; 325 MG/1; MG/1
1 TABLET ORAL 3 TIMES DAILY PRN
Qty: 90 TABLET | Refills: 0 | Status: SHIPPED | OUTPATIENT
Start: 2023-03-01

## 2023-03-01 RX ORDER — HYDROCODONE BITARTRATE AND ACETAMINOPHEN 7.5; 325 MG/1; MG/1
1 TABLET ORAL 3 TIMES DAILY PRN
Qty: 90 TABLET | Refills: 0 | Status: SHIPPED | OUTPATIENT
Start: 2023-04-03 | End: 2023-03-29 | Stop reason: SDUPTHER

## 2023-03-01 NOTE — PROGRESS NOTES
Subjective    CC bilateral feet and leg pain.  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  Symptoms of optic neuritis significantly improved almost resolved.  Denies any new complaints today.  Continues to have good relief of functional benefits with hydrocodone denies any side effects.  Chronic bilateral feet and lower leg neuropathic pain and myofascial pain constant but worse with activity.  Denies new weakness saddle anesthesia bladder bowel continence.  Chronic axial back pain without radicular pain.  Chronic polyarthralgia.  EMG/NCS bilateral lower extremity within normal limit.  Sees neurology for MS  Pain interfering with daily activity work and sleep.  Tried gabapentin did not tolerate.  Currently utilizes Lyrica but can only tolerate once a day 75 mg.  Prescribed by PCP.  Cannot take NSAIDs due to polycystic kidney disease    L-spine and T-spine MRI 2022 Probable old demyelinating plaque in the posterior thoracic cord at the T8 level with no abnormal cervical or thoracic cord enhancement to suggest active demyelination. No new demyelinating plaque is identified. At C3-C4, left greater than right uncinate process hypertrophy results in mild to moderate left neural foraminal narrowing.  Otherwise, no significant spinal canal stenosis or neural foraminal narrowing.    Pain Assessment   Location of Pain:  ana Leg, joint, back  Description of Pain: Dull/Aching, Throbbing, Stabbing  Previous Pain Rating :5  Current Pain Ratin  Aggravating Factors: Activity  Alleviating Factors: Rest, Medication    PEG Assessment   What number best describes your pain on average in the past week?3  What number best describes how, during the past week, pain has interfered with your enjoyment of life?3  What number best describes how, during the past week, pain has interfered with your general activity?8     The following portions of the patient's  history were reviewed and updated as appropriate: allergies, current medications, past family history, past medical history, past social history, past surgical history and problem list.     has a past medical history of Arthritis, COVID-19, Depression, Guillain Barré syndrome (HCC), Hand numbness, High blood pressure, Leg pain, Multiple sclerosis (HCC), Optic neuritis, Pain in both feet, Pneumonia, Polycystic kidney disease, and Right shoulder pain (2022).   has a past surgical history that includes Vasectomy; Cyst Removal; Shoulder surgery (Left, 2018); and Shoulder arthroscopy (Right, 2022).  family history includes Cancer in an other family member; Dementia in his mother; Diabetes in an other family member; Hyperlipidemia in his father; Transient ischemic attack in his father.  Social History     Tobacco Use   • Smoking status: Former     Types: Cigarettes     Quit date: 2010     Years since quittin.1   • Smokeless tobacco: Never   Substance Use Topics   • Alcohol use: Not Currently     Comment: occ       Review of Systems   Musculoskeletal: Positive for arthralgias, back pain and myalgias.   All other systems reviewed and are negative.    Objective   Physical Exam   Constitutional: No distress.   Pulmonary/Chest: Effort normal.   Neurological: A sensory deficit is present.   Vitals reviewed.    PHQ 9 on chart  Opioid risk tool moderate risk (age)    Assessment & Plan    Diagnoses and all orders for this visit:    1. Chronic pain syndrome (Primary)  -     HYDROcodone-acetaminophen (NORCO) 7.5-325 MG per tablet; Take 1 tablet by mouth 3 (Three) Times a Day As Needed for Severe Pain.  Dispense: 90 tablet; Refill: 0  -     HYDROcodone-acetaminophen (NORCO) 7.5-325 MG per tablet; Take 1 tablet by mouth 3 (Three) Times a Day As Needed for Severe Pain. DNF before 4/3/2023  Dispense: 90 tablet; Refill: 0    2. Multiple sclerosis (HCC)    3. Neuropathic pain    4. Myofascial pain syndrome    5.  High risk medication use    Summary  Andrea Dumont is a 47 y.o. male with multiple sclerosis, peripheral neuropathy here for f/u.   Chronic bilateral lower extremity neuropathic pain/myofascial pain related to MS.   Cannot take NSAIDs due to polycystic kidney disease.  EMG/NCS bilateral lower extremity within normal limit.  Keeps follow up with neurology.    Symptoms of optic neuritis significantly improved almost resolved.  Denies any new complaints today.  Continues to have good relief of functional benefits with hydrocodone denies any side effects.    Continue hydrocodone 7.5/325 3 daily as needed for severe pain.  UDS and Inspect reviewed.   Discussed risk of tolerance, dependence, respiratory depression, coma and death associated with use of oral opioids for treatment of chronic nonmalignant pain.     RTC 2  month

## 2023-03-28 ENCOUNTER — TELEPHONE (OUTPATIENT)
Dept: PAIN MEDICINE | Facility: CLINIC | Age: 48
End: 2023-03-28
Payer: COMMERCIAL

## 2023-03-28 DIAGNOSIS — G89.4 CHRONIC PAIN SYNDROME: ICD-10-CM

## 2023-03-28 NOTE — TELEPHONE ENCOUNTER
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

## 2023-03-29 DIAGNOSIS — G89.4 CHRONIC PAIN SYNDROME: ICD-10-CM

## 2023-03-29 RX ORDER — HYDROCODONE BITARTRATE AND ACETAMINOPHEN 7.5; 325 MG/1; MG/1
1 TABLET ORAL 3 TIMES DAILY PRN
Qty: 90 TABLET | Refills: 0 | Status: SHIPPED | OUTPATIENT
Start: 2023-04-01

## 2023-03-31 RX ORDER — HYDROCODONE BITARTRATE AND ACETAMINOPHEN 7.5; 325 MG/1; MG/1
1 TABLET ORAL 3 TIMES DAILY PRN
Qty: 90 TABLET | Refills: 0 | OUTPATIENT
Start: 2023-03-31

## 2023-05-01 ENCOUNTER — OFFICE VISIT (OUTPATIENT)
Dept: PAIN MEDICINE | Facility: CLINIC | Age: 48
End: 2023-05-01
Payer: COMMERCIAL

## 2023-05-01 VITALS
OXYGEN SATURATION: 97 % | RESPIRATION RATE: 16 BRPM | DIASTOLIC BLOOD PRESSURE: 85 MMHG | HEART RATE: 87 BPM | SYSTOLIC BLOOD PRESSURE: 130 MMHG

## 2023-05-01 DIAGNOSIS — M79.2 NEUROPATHIC PAIN: ICD-10-CM

## 2023-05-01 DIAGNOSIS — M79.18 MYOFASCIAL PAIN SYNDROME: ICD-10-CM

## 2023-05-01 DIAGNOSIS — G89.4 CHRONIC PAIN SYNDROME: ICD-10-CM

## 2023-05-01 DIAGNOSIS — G35 MULTIPLE SCLEROSIS: ICD-10-CM

## 2023-05-01 DIAGNOSIS — Z79.899 HIGH RISK MEDICATION USE: Primary | ICD-10-CM

## 2023-05-01 RX ORDER — HYDROCODONE BITARTRATE AND ACETAMINOPHEN 7.5; 325 MG/1; MG/1
1 TABLET ORAL 3 TIMES DAILY PRN
Qty: 90 TABLET | Refills: 0 | Status: SHIPPED | OUTPATIENT
Start: 2023-05-01

## 2023-05-01 RX ORDER — HYDROCODONE BITARTRATE AND ACETAMINOPHEN 7.5; 325 MG/1; MG/1
1 TABLET ORAL 3 TIMES DAILY PRN
Qty: 90 TABLET | Refills: 0 | Status: SHIPPED | OUTPATIENT
Start: 2023-05-31

## 2023-05-01 NOTE — PROGRESS NOTES
Subjective    CC bilateral feet and leg pain.  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  Denies any new issue except worsening neuropathic pain in right leg.  He has had follow-up with neurology, and final MRI has been ordered to be completed Monday.  Otherwise continues to have good relief of functional benefit with hydrocodone and denies side effects.  Chronic bilateral feet and lower leg neuropathic pain and myofascial pain constant but worse with activity.  Denies new weakness saddle anesthesia bladder bowel continence.  Chronic axial back pain without radicular pain.  Chronic polyarthralgia.  EMG/NCS bilateral lower extremity within normal limit.  Sees neurology for MS  Pain interfering with daily activity work and sleep.  Tried gabapentin did not tolerate.  Currently utilizes Lyrica but can only tolerate once a day 75 mg.  Prescribed by PCP.  Cannot take NSAIDs due to polycystic kidney disease    L-spine and T-spine MRI 2022 Probable old demyelinating plaque in the posterior thoracic cord at the T8 level with no abnormal cervical or thoracic cord enhancement to suggest active demyelination. No new demyelinating plaque is identified. At C3-C4, left greater than right uncinate process hypertrophy results in mild to moderate left neural foraminal narrowing.  Otherwise, no significant spinal canal stenosis or neural foraminal narrowing.    Pain Assessment   Location of Pain:  ana Leg, joint, back  Description of Pain: Dull/Aching, Throbbing, Stabbing  Previous Pain Rating :4  Current Pain Ratin  Aggravating Factors: Activity  Alleviating Factors: Rest, Medication    PEG Assessment   What number best describes your pain on average in the past week?3  What number best describes how, during the past week, pain has interfered with your enjoyment of life?5  What number best describes how, during the past week, pain has interfered with  your general activity?7     The following portions of the patient's history were reviewed and updated as appropriate: allergies, current medications, past family history, past medical history, past social history, past surgical history and problem list.     has a past medical history of Arthritis, COVID-19, Depression, Guillain Barré syndrome, Hand numbness, High blood pressure, Leg pain, Multiple sclerosis, Optic neuritis, Pain in both feet, Pneumonia, Polycystic kidney disease, and Right shoulder pain (2022).   has a past surgical history that includes Vasectomy; Cyst Removal; Shoulder surgery (Left, 2018); and Shoulder arthroscopy (Right, 2022).  family history includes Cancer in an other family member; Dementia in his mother; Diabetes in an other family member; Hyperlipidemia in his father; Transient ischemic attack in his father.  Social History     Tobacco Use   • Smoking status: Former     Types: Cigarettes     Quit date: 2010     Years since quittin.3   • Smokeless tobacco: Never   Substance Use Topics   • Alcohol use: Not Currently     Comment: occ       Review of Systems   Musculoskeletal: Positive for arthralgias, back pain and myalgias.   All other systems reviewed and are negative.    Objective   Physical Exam   Constitutional: No distress.   Pulmonary/Chest: Effort normal.   Neurological: A sensory deficit is present.   Vitals reviewed.    PHQ 9 on chart  Opioid risk tool moderate risk (age)    Assessment & Plan    Diagnoses and all orders for this visit:    1. High risk medication use (Primary)  -     Urine Drug Screen - Urine, Clean Catch; Future    2. Chronic pain syndrome  -     HYDROcodone-acetaminophen (NORCO) 7.5-325 MG per tablet; Take 1 tablet by mouth 3 (Three) Times a Day As Needed for Severe Pain.  Dispense: 90 tablet; Refill: 0  -     HYDROcodone-acetaminophen (NORCO) 7.5-325 MG per tablet; Take 1 tablet by mouth 3 (Three) Times a Day As Needed for Severe Pain. DNF  before 5/31/2023  Dispense: 90 tablet; Refill: 0    3. Multiple sclerosis    4. Neuropathic pain    5. Myofascial pain syndrome    Summary  Andrea Dumont is a 48 y.o. male with multiple sclerosis, peripheral neuropathy here for f/u.   Chronic bilateral lower extremity neuropathic pain/myofascial pain related to MS.   Cannot take NSAIDs due to polycystic kidney disease.  EMG/NCS bilateral lower extremity within normal limit.  Keeps follow up with neurology.    Denies any new issue except worsening neuropathic pain in right leg.  He has had follow-up with neurology, and final MRI has been ordered to be completed Monday.  Otherwise continues to have good relief of functional benefit with hydrocodone and denies side effects.  Encouraged to try Lyrica at bedtime if symptoms are interfering with sleep.    Continue hydrocodone 7.5/325 3 daily as needed for severe pain.  UDS and Inspect reviewed.   Discussed risk of tolerance, dependence, respiratory depression, coma and death associated with use of oral opioids for treatment of chronic nonmalignant pain.     RTC 2  month

## 2023-05-22 ENCOUNTER — TELEPHONE (OUTPATIENT)
Dept: PAIN MEDICINE | Facility: CLINIC | Age: 48
End: 2023-05-22

## 2023-05-25 NOTE — TELEPHONE ENCOUNTER
Caller: PATIENT    Relationship to patient: SELF     Best call back number: [phone]    Patient is needing: PATIENT WAS FOLLOWING UP ON THE MESSAGE HE LEFT ON 05.22.23. PATIENT STATED HIS PRIMARY CARE PROVIDER PRESCRIBED HIM LYRICA BUT HE WANTED TO MAKE SURE THIS WAS OKAY TO TAKE BEFORE PICKING IT UP FROM THE PHARMACY DUE TO HAVING A CONTRACT WITH DR. CASPER. I ATTEMPTED TO WARM TRANSFER CALL TO THE OFFICE BUT RECEIVED NO ANSWER. THANK YOU!

## 2023-05-27 ENCOUNTER — APPOINTMENT (OUTPATIENT)
Dept: MRI IMAGING | Facility: HOSPITAL | Age: 48
End: 2023-05-27
Payer: COMMERCIAL

## 2023-05-27 ENCOUNTER — APPOINTMENT (OUTPATIENT)
Dept: CARDIOLOGY | Facility: HOSPITAL | Age: 48
End: 2023-05-27
Payer: COMMERCIAL

## 2023-05-27 ENCOUNTER — HOSPITAL ENCOUNTER (EMERGENCY)
Facility: HOSPITAL | Age: 48
Discharge: HOME | End: 2023-05-27
Attending: EMERGENCY MEDICINE
Payer: COMMERCIAL

## 2023-05-27 VITALS
RESPIRATION RATE: 16 BRPM | TEMPERATURE: 97.2 F | WEIGHT: 219.14 LBS | HEIGHT: 71 IN | OXYGEN SATURATION: 97 % | HEART RATE: 75 BPM | SYSTOLIC BLOOD PRESSURE: 117 MMHG | BODY MASS INDEX: 30.68 KG/M2 | DIASTOLIC BLOOD PRESSURE: 71 MMHG

## 2023-05-27 DIAGNOSIS — G35 MULTIPLE SCLEROSIS EXACERBATION: ICD-10-CM

## 2023-05-27 DIAGNOSIS — R20.2 PARESTHESIAS: Primary | ICD-10-CM

## 2023-05-27 LAB
ALBUMIN SERPL-MCNC: 4.5 G/DL (ref 3.5–5.2)
ALBUMIN/GLOB SERPL: 1.4 G/DL
ALP SERPL-CCNC: 110 U/L (ref 39–117)
ALT SERPL W P-5'-P-CCNC: 36 U/L (ref 1–41)
ANION GAP SERPL CALCULATED.3IONS-SCNC: 15 MMOL/L (ref 5–15)
APTT PPP: 30.2 SECONDS (ref 61–76.5)
AST SERPL-CCNC: 24 U/L (ref 1–40)
BASOPHILS # BLD AUTO: 0.1 10*3/MM3 (ref 0–0.2)
BASOPHILS NFR BLD AUTO: 0.6 % (ref 0–1.5)
BH CV LOWER VASCULAR LEFT COMMON FEMORAL AUGMENT: NORMAL
BH CV LOWER VASCULAR LEFT COMMON FEMORAL COMPETENT: NORMAL
BH CV LOWER VASCULAR LEFT COMMON FEMORAL COMPRESS: NORMAL
BH CV LOWER VASCULAR LEFT COMMON FEMORAL PHASIC: NORMAL
BH CV LOWER VASCULAR LEFT COMMON FEMORAL SPONT: NORMAL
BH CV LOWER VASCULAR LEFT DISTAL FEMORAL COMPRESS: NORMAL
BH CV LOWER VASCULAR LEFT GASTRONEMIUS COMPRESS: NORMAL
BH CV LOWER VASCULAR LEFT GREATER SAPH AK COMPRESS: NORMAL
BH CV LOWER VASCULAR LEFT GREATER SAPH BK COMPRESS: NORMAL
BH CV LOWER VASCULAR LEFT LESSER SAPH COMPRESS: NORMAL
BH CV LOWER VASCULAR LEFT MID FEMORAL AUGMENT: NORMAL
BH CV LOWER VASCULAR LEFT MID FEMORAL COMPETENT: NORMAL
BH CV LOWER VASCULAR LEFT MID FEMORAL COMPRESS: NORMAL
BH CV LOWER VASCULAR LEFT MID FEMORAL PHASIC: NORMAL
BH CV LOWER VASCULAR LEFT MID FEMORAL SPONT: NORMAL
BH CV LOWER VASCULAR LEFT PERONEAL COMPRESS: NORMAL
BH CV LOWER VASCULAR LEFT POPLITEAL AUGMENT: NORMAL
BH CV LOWER VASCULAR LEFT POPLITEAL COMPETENT: NORMAL
BH CV LOWER VASCULAR LEFT POPLITEAL COMPRESS: NORMAL
BH CV LOWER VASCULAR LEFT POPLITEAL PHASIC: NORMAL
BH CV LOWER VASCULAR LEFT POPLITEAL SPONT: NORMAL
BH CV LOWER VASCULAR LEFT POSTERIOR TIBIAL COMPRESS: NORMAL
BH CV LOWER VASCULAR LEFT PROXIMAL FEMORAL COMPRESS: NORMAL
BH CV LOWER VASCULAR LEFT SAPHENOFEMORAL JUNCTION COMPRESS: NORMAL
BH CV LOWER VASCULAR RIGHT COMMON FEMORAL AUGMENT: NORMAL
BH CV LOWER VASCULAR RIGHT COMMON FEMORAL COMPETENT: NORMAL
BH CV LOWER VASCULAR RIGHT COMMON FEMORAL COMPRESS: NORMAL
BH CV LOWER VASCULAR RIGHT COMMON FEMORAL PHASIC: NORMAL
BH CV LOWER VASCULAR RIGHT COMMON FEMORAL SPONT: NORMAL
BH CV LOWER VASCULAR RIGHT DISTAL FEMORAL COMPRESS: NORMAL
BH CV LOWER VASCULAR RIGHT GASTRONEMIUS COMPRESS: NORMAL
BH CV LOWER VASCULAR RIGHT GREATER SAPH AK COMPRESS: NORMAL
BH CV LOWER VASCULAR RIGHT GREATER SAPH BK COMPRESS: NORMAL
BH CV LOWER VASCULAR RIGHT LESSER SAPH COMPRESS: NORMAL
BH CV LOWER VASCULAR RIGHT MID FEMORAL AUGMENT: NORMAL
BH CV LOWER VASCULAR RIGHT MID FEMORAL COMPETENT: NORMAL
BH CV LOWER VASCULAR RIGHT MID FEMORAL COMPRESS: NORMAL
BH CV LOWER VASCULAR RIGHT MID FEMORAL PHASIC: NORMAL
BH CV LOWER VASCULAR RIGHT MID FEMORAL SPONT: NORMAL
BH CV LOWER VASCULAR RIGHT PERONEAL COMPRESS: NORMAL
BH CV LOWER VASCULAR RIGHT POPLITEAL AUGMENT: NORMAL
BH CV LOWER VASCULAR RIGHT POPLITEAL COMPETENT: NORMAL
BH CV LOWER VASCULAR RIGHT POPLITEAL COMPRESS: NORMAL
BH CV LOWER VASCULAR RIGHT POPLITEAL PHASIC: NORMAL
BH CV LOWER VASCULAR RIGHT POPLITEAL SPONT: NORMAL
BH CV LOWER VASCULAR RIGHT POSTERIOR TIBIAL COMPRESS: NORMAL
BH CV LOWER VASCULAR RIGHT PROXIMAL FEMORAL COMPRESS: NORMAL
BH CV LOWER VASCULAR RIGHT SAPHENOFEMORAL JUNCTION COMPRESS: NORMAL
BH CV VAS PRELIMINARY FINDINGS SCRIPTING: 1
BILIRUB SERPL-MCNC: 0.2 MG/DL (ref 0–1.2)
BUN SERPL-MCNC: 15 MG/DL (ref 6–20)
BUN/CREAT SERPL: 16 (ref 7–25)
CALCIUM SPEC-SCNC: 9.4 MG/DL (ref 8.6–10.5)
CHLORIDE SERPL-SCNC: 101 MMOL/L (ref 98–107)
CO2 SERPL-SCNC: 22 MMOL/L (ref 22–29)
CREAT SERPL-MCNC: 0.94 MG/DL (ref 0.76–1.27)
DEPRECATED RDW RBC AUTO: 40.7 FL (ref 37–54)
EGFRCR SERPLBLD CKD-EPI 2021: 101.2 ML/MIN/1.73
EOSINOPHIL # BLD AUTO: 0.1 10*3/MM3 (ref 0–0.4)
EOSINOPHIL NFR BLD AUTO: 1.2 % (ref 0.3–6.2)
ERYTHROCYTE [DISTWIDTH] IN BLOOD BY AUTOMATED COUNT: 13.8 % (ref 12.3–15.4)
GLOBULIN UR ELPH-MCNC: 3.2 GM/DL
GLUCOSE BLDC GLUCOMTR-MCNC: 143 MG/DL (ref 70–105)
GLUCOSE SERPL-MCNC: 125 MG/DL (ref 65–99)
HCT VFR BLD AUTO: 48.1 % (ref 37.5–51)
HGB BLD-MCNC: 16.3 G/DL (ref 13–17.7)
HOLD SPECIMEN: NORMAL
HOLD SPECIMEN: NORMAL
INR PPP: 1 (ref 0.93–1.1)
LYMPHOCYTES # BLD AUTO: 1.7 10*3/MM3 (ref 0.7–3.1)
LYMPHOCYTES NFR BLD AUTO: 19.2 % (ref 19.6–45.3)
MAGNESIUM SERPL-MCNC: 2 MG/DL (ref 1.6–2.6)
MAXIMAL PREDICTED HEART RATE: 174 BPM
MCH RBC QN AUTO: 28.6 PG (ref 26.6–33)
MCHC RBC AUTO-ENTMCNC: 33.8 G/DL (ref 31.5–35.7)
MCV RBC AUTO: 84.5 FL (ref 79–97)
MONOCYTES # BLD AUTO: 1 10*3/MM3 (ref 0.1–0.9)
MONOCYTES NFR BLD AUTO: 10.4 % (ref 5–12)
NEUTROPHILS NFR BLD AUTO: 6.3 10*3/MM3 (ref 1.7–7)
NEUTROPHILS NFR BLD AUTO: 68.6 % (ref 42.7–76)
NRBC BLD AUTO-RTO: 0 /100 WBC (ref 0–0.2)
PLATELET # BLD AUTO: 271 10*3/MM3 (ref 140–450)
PMV BLD AUTO: 7.5 FL (ref 6–12)
POTASSIUM SERPL-SCNC: 3.8 MMOL/L (ref 3.5–5.2)
PROT SERPL-MCNC: 7.7 G/DL (ref 6–8.5)
PROTHROMBIN TIME: 10.7 SECONDS (ref 9.6–11.7)
RBC # BLD AUTO: 5.69 10*6/MM3 (ref 4.14–5.8)
SODIUM SERPL-SCNC: 138 MMOL/L (ref 136–145)
STRESS TARGET HR: 148 BPM
WBC NRBC COR # BLD: 9.1 10*3/MM3 (ref 3.4–10.8)
WHOLE BLOOD HOLD COAG: NORMAL
WHOLE BLOOD HOLD SPECIMEN: NORMAL

## 2023-05-27 PROCEDURE — 72156 MRI NECK SPINE W/O & W/DYE: CPT

## 2023-05-27 PROCEDURE — 25010000002 GADOTERIDOL PER 1 ML: Performed by: EMERGENCY MEDICINE

## 2023-05-27 PROCEDURE — 99283 EMERGENCY DEPT VISIT LOW MDM: CPT

## 2023-05-27 PROCEDURE — 96365 THER/PROPH/DIAG IV INF INIT: CPT

## 2023-05-27 PROCEDURE — A9579 GAD-BASE MR CONTRAST NOS,1ML: HCPCS | Performed by: EMERGENCY MEDICINE

## 2023-05-27 PROCEDURE — 85610 PROTHROMBIN TIME: CPT | Performed by: EMERGENCY MEDICINE

## 2023-05-27 PROCEDURE — 93970 EXTREMITY STUDY: CPT

## 2023-05-27 PROCEDURE — 25010000002 ONDANSETRON PER 1 MG: Performed by: EMERGENCY MEDICINE

## 2023-05-27 PROCEDURE — 96375 TX/PRO/DX INJ NEW DRUG ADDON: CPT

## 2023-05-27 PROCEDURE — 82948 REAGENT STRIP/BLOOD GLUCOSE: CPT

## 2023-05-27 PROCEDURE — 0 METHYLPREDNISOLONE PER 125 MG: Performed by: EMERGENCY MEDICINE

## 2023-05-27 PROCEDURE — 85730 THROMBOPLASTIN TIME PARTIAL: CPT | Performed by: EMERGENCY MEDICINE

## 2023-05-27 PROCEDURE — 70553 MRI BRAIN STEM W/O & W/DYE: CPT

## 2023-05-27 PROCEDURE — 25010000002 MORPHINE PER 10 MG: Performed by: EMERGENCY MEDICINE

## 2023-05-27 PROCEDURE — 80053 COMPREHEN METABOLIC PANEL: CPT | Performed by: EMERGENCY MEDICINE

## 2023-05-27 PROCEDURE — 83735 ASSAY OF MAGNESIUM: CPT | Performed by: EMERGENCY MEDICINE

## 2023-05-27 PROCEDURE — 85025 COMPLETE CBC W/AUTO DIFF WBC: CPT | Performed by: EMERGENCY MEDICINE

## 2023-05-27 RX ORDER — PANTOPRAZOLE SODIUM 40 MG/1
40 TABLET, DELAYED RELEASE ORAL DAILY
Qty: 14 TABLET | Refills: 0 | Status: SHIPPED | OUTPATIENT
Start: 2023-05-27

## 2023-05-27 RX ORDER — ONDANSETRON 2 MG/ML
4 INJECTION INTRAMUSCULAR; INTRAVENOUS ONCE
Status: COMPLETED | OUTPATIENT
Start: 2023-05-27 | End: 2023-05-27

## 2023-05-27 RX ORDER — PANTOPRAZOLE SODIUM 40 MG/10ML
40 INJECTION, POWDER, LYOPHILIZED, FOR SOLUTION INTRAVENOUS ONCE
Status: COMPLETED | OUTPATIENT
Start: 2023-05-27 | End: 2023-05-27

## 2023-05-27 RX ORDER — SODIUM CHLORIDE 0.9 % (FLUSH) 0.9 %
10 SYRINGE (ML) INJECTION AS NEEDED
Status: DISCONTINUED | OUTPATIENT
Start: 2023-05-27 | End: 2023-05-27 | Stop reason: HOSPADM

## 2023-05-27 RX ADMIN — ONDANSETRON 4 MG: 2 INJECTION INTRAMUSCULAR; INTRAVENOUS at 06:28

## 2023-05-27 RX ADMIN — GADOTERIDOL 20 ML: 279.3 INJECTION, SOLUTION INTRAVENOUS at 08:49

## 2023-05-27 RX ADMIN — MORPHINE SULFATE 4 MG: 4 INJECTION, SOLUTION INTRAMUSCULAR; INTRAVENOUS at 06:28

## 2023-05-27 RX ADMIN — PANTOPRAZOLE SODIUM 40 MG: 40 INJECTION, POWDER, LYOPHILIZED, FOR SOLUTION INTRAVENOUS at 06:54

## 2023-05-27 RX ADMIN — SODIUM CHLORIDE 1000 MG: 900 INJECTION INTRAVENOUS at 06:53

## 2023-05-27 NOTE — DISCHARGE INSTRUCTIONS
Return for any problems   [facility] calling states she is out of medication       •  ESCITALOPRAM 5 MG Oral Tab, TAKE 1 TABLET BY MOUTH DAILY. , Disp: 30 tablet, Rfl: 2

## 2023-05-27 NOTE — ED PROVIDER NOTES
Subjective   History of Present Illness  Chief complaint: Patient is a pleasant 48-year-old who presents with concerns for progression of multiple sclerosis.  He was diagnosed in 2005.  He has had 3 major flares over the years.  His last MS flare was in 2017 or 18.  He states that he has constant neuropathy from his MS and is feet.  His last MS flare started in his feet and progressed caudally.  This is doing the same.  He noticed a week ago he was having foot numbness and has progressed with a needle and pins sensation of pain up his leg and started in his right foot as well yesterday afternoon with the numbness and increasing discomfort.  He has not had fever.  No symptoms in his arms or face.  No headache.  He does do Copaxone shot every other day.  Over the last year he states he has not been quite as compliant with that.  He is wanting to switch to something p.o.  However over the last 3 months he has been back on it and relatively compliant.  But with the symptoms progressively worsening over the last week he presented here for evaluation.  He has good strength.    Context:    Duration: 7 days    Timing: As above    Severity:    Associated Symptoms:        PCP:  LMP:        Review of Systems   Constitutional: Negative.    HENT: Negative.    Eyes: Negative.    Musculoskeletal:        Bilateral leg pain   Neurological: Positive for numbness. Negative for speech difficulty, weakness and headaches.       Past Medical History:   Diagnosis Date   • Arthritis    • COVID-19     1/2/2021   • Depression    • Guillain Barré syndrome    • Hand numbness     left-- cramping   • High blood pressure    • Leg pain    • Multiple sclerosis    • Optic neuritis    • Pain in both feet    • Pneumonia      with covid 1/2/2021   • Polycystic kidney disease    • Right shoulder pain 08/2022       Allergies   Allergen Reactions   • Codeine Hives   • Nsaids Other (See Comments)     Polycystic Kidney disease-- sees nephrologist   •  Tramadol Hives       Past Surgical History:   Procedure Laterality Date   • CYST REMOVAL      on left wrist   • SHOULDER ARTHROSCOPY Right 2022    Procedure: SHOULDER ARTHROSCOPY,  DISTAL CLAVICLE RESECTION,SUBACROMIAL DECOMPRESSION;  Surgeon: Caden Bani MD;  Location: [facility] MAIN OR;  Service: Orthopedics;  Laterality: Right;   • SHOULDER SURGERY Left 2018    LEFT SHOULDER SCOPE   • VASECTOMY         Family History   Problem Relation Age of Onset   • Dementia Mother    • Hyperlipidemia Father    • Transient ischemic attack Father    • Cancer Other    • Diabetes Other        Social History     Socioeconomic History   • Marital status: Legally    Tobacco Use   • Smoking status: Former     Types: Cigarettes     Quit date: 2010     Years since quittin.4   • Smokeless tobacco: Never   Vaping Use   • Vaping Use: Never used   Substance and Sexual Activity   • Alcohol use: Not Currently     Comment: occ   • Drug use: No   • Sexual activity: Defer           Objective   Physical Exam  Vitals and nursing note reviewed.   Constitutional:       Appearance: Normal appearance.   HENT:      Head: Normocephalic and atraumatic.   Eyes:      Extraocular Movements: Extraocular movements intact.      Pupils: Pupils are equal, round, and reactive to light.   Cardiovascular:      Rate and Rhythm: Normal rate and regular rhythm.      Heart sounds: Normal heart sounds.   Pulmonary:      Effort: Pulmonary effort is normal.      Breath sounds: Normal breath sounds.   Abdominal:      Palpations: Abdomen is soft.   Musculoskeletal:      Comments: Tenderness in bilateral calf.  No swelling.   Skin:     General: Skin is warm and dry.   Neurological:      General: No focal deficit present.      Mental Status: He is alert and oriented to person, place, and time.      Comments: Subjective numbness to the feet.  There is good strength.   Psychiatric:         Mood and Affect: Mood normal.         Thought  Content: Thought content normal.         Procedures           ED Course  ED Course as of 05/27/23 0801   Sat May 27, 2023   0643 I spoke with Dr. London.  He states patient needs to have a gram of Solu-Medrol a day for 5 days along with Protonix.  And to follow-up with his neurologist outpatient. [LH]   0801 DVT study negative per vascular tech [LH]      ED Course User Index  [LH] Elias Pruitt DO           Results for orders placed or performed during the hospital encounter of 05/27/23   Comprehensive Metabolic Panel    Specimen: Blood   Result Value Ref Range    Glucose 125 (H) 65 - 99 mg/dL    BUN 15 6 - 20 mg/dL    Creatinine 0.94 0.76 - 1.27 mg/dL    Sodium 138 136 - 145 mmol/L    Potassium 3.8 3.5 - 5.2 mmol/L    Chloride 101 98 - 107 mmol/L    CO2 22.0 22.0 - 29.0 mmol/L    Calcium 9.4 8.6 - 10.5 mg/dL    Total Protein 7.7 6.0 - 8.5 g/dL    Albumin 4.5 3.5 - 5.2 g/dL    ALT (SGPT) 36 1 - 41 U/L    AST (SGOT) 24 1 - 40 U/L    Alkaline Phosphatase 110 39 - 117 U/L    Total Bilirubin 0.2 0.0 - 1.2 mg/dL    Globulin 3.2 gm/dL    A/G Ratio 1.4 g/dL    BUN/Creatinine Ratio 16.0 7.0 - 25.0    Anion Gap 15.0 5.0 - 15.0 mmol/L    eGFR 101.2 >60.0 mL/min/1.73   Protime-INR    Specimen: Blood   Result Value Ref Range    Protime 10.7 9.6 - 11.7 Seconds    INR 1.00 0.93 - 1.10   aPTT    Specimen: Blood   Result Value Ref Range    PTT 30.2 (L) 61.0 - 76.5 seconds   Magnesium    Specimen: Blood   Result Value Ref Range    Magnesium 2.0 1.6 - 2.6 mg/dL   CBC Auto Differential    Specimen: Blood   Result Value Ref Range    WBC 9.10 3.40 - 10.80 10*3/mm3    RBC 5.69 4.14 - 5.80 10*6/mm3    Hemoglobin 16.3 13.0 - 17.7 g/dL    Hematocrit 48.1 37.5 - 51.0 %    MCV 84.5 79.0 - 97.0 fL    MCH 28.6 26.6 - 33.0 pg    MCHC 33.8 31.5 - 35.7 g/dL    RDW 13.8 12.3 - 15.4 %    RDW-SD 40.7 37.0 - 54.0 fl    MPV 7.5 6.0 - 12.0 fL    Platelets 271 140 - 450 10*3/mm3    Neutrophil % 68.6 42.7 - 76.0 %    Lymphocyte % 19.2 (L) 19.6 -  45.3 %    Monocyte % 10.4 5.0 - 12.0 %    Eosinophil % 1.2 0.3 - 6.2 %    Basophil % 0.6 0.0 - 1.5 %    Neutrophils, Absolute 6.30 1.70 - 7.00 10*3/mm3    Lymphocytes, Absolute 1.70 0.70 - 3.10 10*3/mm3    Monocytes, Absolute 1.00 (H) 0.10 - 0.90 10*3/mm3    Eosinophils, Absolute 0.10 0.00 - 0.40 10*3/mm3    Basophils, Absolute 0.10 0.00 - 0.20 10*3/mm3    nRBC 0.0 0.0 - 0.2 /100 WBC   POC Glucose Once    Specimen: Blood   Result Value Ref Range    Glucose 143 (H) 70 - 105 mg/dL   Green Top (Gel)   Result Value Ref Range    Extra Tube done    Lavender Top   Result Value Ref Range    Extra Tube hold for add-on    Gold Top - SST   Result Value Ref Range    Extra Tube Hold for add-ons.    Light Blue Top   Result Value Ref Range    Extra Tube Hold for add-ons.    Duplex Venous Lower Extremity - Bilateral   Result Value Ref Range    Target HR (85%) 148 bpm    Max. Pred. HR (100%) 174 bpm    Right Common Femoral Spont Y     Right Common Femoral Competent Y     Right Common Femoral Phasic Y     Right Common Femoral Compress C     Right Common Femoral Augment Y     Right Saphenofemoral Junction Compress C     Right Proximal Femoral Compress C     Right Mid Femoral Spont Y     Right Mid Femoral Competent Y     Right Mid Femoral Phasic Y     Right Mid Femoral Compress C     Right Mid Femoral Augment Y     Right Distal Femoral Compress C     Right Popliteal Spont Y     Right Popliteal Competent Y     Right Popliteal Phasic Y     Right Popliteal Compress C     Right Popliteal Augment Y     Right Posterior Tibial Compress C     Right Peroneal Compress C     Right Gastronemius Compress C     Right Greater Saph AK Compress C     Right Greater Saph BK Compress C     Right Lesser Saph Compress C     Left Common Femoral Spont Y     Left Common Femoral Competent Y     Left Common Femoral Phasic Y     Left Common Femoral Compress C     Left Common Femoral Augment Y     Left Saphenofemoral Junction Compress C     Left Proximal Femoral  Compress C     Left Mid Femoral Spont Y     Left Mid Femoral Competent Y     Left Mid Femoral Phasic Y     Left Mid Femoral Compress C     Left Mid Femoral Augment Y     Left Distal Femoral Compress C     Left Popliteal Spont Y     Left Popliteal Competent Y     Left Popliteal Phasic Y     Left Popliteal Compress C     Left Popliteal Augment Y     Left Posterior Tibial Compress C     Left Peroneal Compress C     Left Gastronemius Compress C     Left Greater Saph AK Compress C     Left Greater Saph BK Compress C     Left Lesser Saph Compress C     BH CV VAS PRELIMINARY FINDINGS SCRIPTING 1.0                                      Medical Decision Making  Patient was seen evaluated for above stated problems    Differential diagnosis includes but is not limited to MS exacerbation, stroke, brain mass lesion, DVT    Patient had Doppler obtained negative for DVT.  Bilateral calf pain.  I discussed the case with Dr. London.  At this point in time we will treat for MS exacerbation with 5 days infusion of Solu-Medrol 1000 mg.  He would like Protonix supplemented as well.  First dose was ordered here in the emergency department.  MRI obtained of brain and cervical spine as he has had cervical spine lesions in the past as well.  Faxed order form to the infusion center.  Patient's care to be transitioned to Dr. King pending MRI results.    Amount and/or Complexity of Data Reviewed  Labs: ordered. Decision-making details documented in ED Course.     Details: Laboratory analysis reviewed by myself no acute findings.  Radiology: ordered and independent interpretation performed.      Risk  Prescription drug management.  Parenteral controlled substances.          Final diagnoses:   None   Exacerbation of MS    ED Disposition  ED Disposition     None          No follow-up provider specified.       Medication List      No changes were made to your prescriptions during this visit.          lEias Pruitt,   05/27/23 3016

## 2023-05-30 ENCOUNTER — TELEPHONE (OUTPATIENT)
Dept: PAIN MEDICINE | Facility: CLINIC | Age: 48
End: 2023-05-30
Payer: COMMERCIAL

## 2023-05-30 ENCOUNTER — HOSPITAL ENCOUNTER (OUTPATIENT)
Dept: INFUSION THERAPY | Facility: HOSPITAL | Age: 48
Discharge: HOME | End: 2023-05-30
Admitting: EMERGENCY MEDICINE

## 2023-05-30 VITALS
RESPIRATION RATE: 16 BRPM | SYSTOLIC BLOOD PRESSURE: 131 MMHG | HEART RATE: 98 BPM | TEMPERATURE: 98.8 F | DIASTOLIC BLOOD PRESSURE: 87 MMHG

## 2023-05-30 DIAGNOSIS — G89.4 CHRONIC PAIN SYNDROME: ICD-10-CM

## 2023-05-30 DIAGNOSIS — G35 MULTIPLE SCLEROSIS EXACERBATION: Primary | ICD-10-CM

## 2023-05-30 PROCEDURE — 96365 THER/PROPH/DIAG IV INF INIT: CPT

## 2023-05-30 PROCEDURE — 0 METHYLPREDNISOLONE PER 125 MG: Performed by: EMERGENCY MEDICINE

## 2023-05-30 PROCEDURE — 36415 COLL VENOUS BLD VENIPUNCTURE: CPT

## 2023-05-30 RX ORDER — SODIUM CHLORIDE 9 MG/ML
250 INJECTION, SOLUTION INTRAVENOUS ONCE
Status: CANCELLED | OUTPATIENT
Start: 2023-05-30

## 2023-05-30 RX ORDER — SODIUM CHLORIDE 9 MG/ML
250 INJECTION, SOLUTION INTRAVENOUS ONCE
Status: DISCONTINUED | OUTPATIENT
Start: 2023-05-30 | End: 2023-06-01 | Stop reason: HOSPADM

## 2023-05-30 RX ORDER — SODIUM CHLORIDE 9 MG/ML
250 INJECTION, SOLUTION INTRAVENOUS ONCE
Status: CANCELLED | OUTPATIENT
Start: 2023-05-31

## 2023-05-30 RX ADMIN — SODIUM CHLORIDE 1000 MG: 900 INJECTION INTRAVENOUS at 14:58

## 2023-05-30 NOTE — TELEPHONE ENCOUNTER
5/30/23-- Dr ROSA-- Arturo leaves  for out of town tomorrow at 645am-- would like meds sent in today please--Kristian andujar-- to fill 5/30--  Hydrocodone acet 7.5-325-- call back #[phone]

## 2023-05-31 ENCOUNTER — HOSPITAL ENCOUNTER (OUTPATIENT)
Dept: INFUSION THERAPY | Facility: HOSPITAL | Age: 48
Discharge: HOME | End: 2023-05-31
Admitting: EMERGENCY MEDICINE

## 2023-05-31 VITALS
OXYGEN SATURATION: 97 % | SYSTOLIC BLOOD PRESSURE: 125 MMHG | DIASTOLIC BLOOD PRESSURE: 80 MMHG | HEART RATE: 105 BPM | RESPIRATION RATE: 16 BRPM

## 2023-05-31 DIAGNOSIS — G35 MULTIPLE SCLEROSIS EXACERBATION: Primary | ICD-10-CM

## 2023-05-31 PROCEDURE — 96365 THER/PROPH/DIAG IV INF INIT: CPT

## 2023-05-31 PROCEDURE — 0 METHYLPREDNISOLONE PER 125 MG: Performed by: EMERGENCY MEDICINE

## 2023-05-31 RX ORDER — SODIUM CHLORIDE 9 MG/ML
250 INJECTION, SOLUTION INTRAVENOUS ONCE
Status: CANCELLED | OUTPATIENT
Start: 2023-06-01

## 2023-05-31 RX ORDER — SODIUM CHLORIDE 9 MG/ML
250 INJECTION, SOLUTION INTRAVENOUS ONCE
Status: DISCONTINUED | OUTPATIENT
Start: 2023-05-31 | End: 2023-06-02 | Stop reason: HOSPADM

## 2023-05-31 RX ADMIN — SODIUM CHLORIDE 1000 MG: 9 INJECTION, SOLUTION INTRAVENOUS at 14:33

## 2023-06-01 ENCOUNTER — HOSPITAL ENCOUNTER (OUTPATIENT)
Dept: INFUSION THERAPY | Facility: HOSPITAL | Age: 48
Discharge: HOME | End: 2023-06-01
Admitting: EMERGENCY MEDICINE
Payer: COMMERCIAL

## 2023-06-01 VITALS
TEMPERATURE: 99 F | SYSTOLIC BLOOD PRESSURE: 133 MMHG | HEART RATE: 78 BPM | RESPIRATION RATE: 16 BRPM | DIASTOLIC BLOOD PRESSURE: 81 MMHG

## 2023-06-01 DIAGNOSIS — G35 MULTIPLE SCLEROSIS EXACERBATION: Primary | ICD-10-CM

## 2023-06-01 PROCEDURE — 0 METHYLPREDNISOLONE PER 125 MG: Performed by: EMERGENCY MEDICINE

## 2023-06-01 PROCEDURE — 96365 THER/PROPH/DIAG IV INF INIT: CPT

## 2023-06-01 RX ORDER — SODIUM CHLORIDE 9 MG/ML
250 INJECTION, SOLUTION INTRAVENOUS ONCE
Status: CANCELLED | OUTPATIENT
Start: 2023-06-02

## 2023-06-01 RX ADMIN — SODIUM CHLORIDE 1000 MG: 900 INJECTION INTRAVENOUS at 14:10

## 2023-06-02 ENCOUNTER — HOSPITAL ENCOUNTER (OUTPATIENT)
Dept: INFUSION THERAPY | Facility: HOSPITAL | Age: 48
Discharge: HOME | End: 2023-06-02
Payer: COMMERCIAL

## 2023-06-02 VITALS
RESPIRATION RATE: 18 BRPM | SYSTOLIC BLOOD PRESSURE: 129 MMHG | DIASTOLIC BLOOD PRESSURE: 81 MMHG | HEART RATE: 83 BPM | OXYGEN SATURATION: 95 %

## 2023-06-02 DIAGNOSIS — G35 MULTIPLE SCLEROSIS EXACERBATION: Primary | ICD-10-CM

## 2023-06-02 PROCEDURE — 0 METHYLPREDNISOLONE PER 125 MG: Performed by: EMERGENCY MEDICINE

## 2023-06-02 PROCEDURE — 96365 THER/PROPH/DIAG IV INF INIT: CPT

## 2023-06-02 RX ORDER — SODIUM CHLORIDE 9 MG/ML
250 INJECTION, SOLUTION INTRAVENOUS ONCE
Status: DISCONTINUED | OUTPATIENT
Start: 2023-06-02 | End: 2023-06-04 | Stop reason: HOSPADM

## 2023-06-02 RX ORDER — SODIUM CHLORIDE 9 MG/ML
250 INJECTION, SOLUTION INTRAVENOUS ONCE
Status: CANCELLED | OUTPATIENT
Start: 2023-06-02

## 2023-06-02 RX ADMIN — SODIUM CHLORIDE 1000 MG: 900 INJECTION INTRAVENOUS at 14:30

## 2023-06-12 RX ORDER — HYDROCODONE BITARTRATE AND ACETAMINOPHEN 7.5; 325 MG/1; MG/1
1 TABLET ORAL 3 TIMES DAILY PRN
Qty: 90 TABLET | Refills: 0 | OUTPATIENT
Start: 2023-06-12

## 2023-07-25 ENCOUNTER — TELEPHONE (OUTPATIENT)
Dept: PAIN MEDICINE | Facility: CLINIC | Age: 48
End: 2023-07-25
Payer: COMMERCIAL

## 2023-07-25 DIAGNOSIS — G89.4 CHRONIC PAIN SYNDROME: ICD-10-CM

## 2023-07-25 RX ORDER — HYDROCODONE BITARTRATE AND ACETAMINOPHEN 7.5; 325 MG/1; MG/1
1 TABLET ORAL 3 TIMES DAILY PRN
Qty: 90 TABLET | Refills: 0 | Status: SHIPPED | OUTPATIENT
Start: 2023-07-25

## 2023-07-25 NOTE — TELEPHONE ENCOUNTER
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

## 2023-07-25 NOTE — TELEPHONE ENCOUNTER
Actually he picked them up on 6/30. I will resend for Friday 7/28. Inspect reviewed, sent, thanks.

## 2023-08-29 ENCOUNTER — OFFICE VISIT (OUTPATIENT)
Dept: PAIN MEDICINE | Facility: CLINIC | Age: 48
End: 2023-08-29
Payer: COMMERCIAL

## 2023-08-29 VITALS
SYSTOLIC BLOOD PRESSURE: 117 MMHG | OXYGEN SATURATION: 98 % | DIASTOLIC BLOOD PRESSURE: 87 MMHG | RESPIRATION RATE: 16 BRPM | HEART RATE: 77 BPM

## 2023-08-29 DIAGNOSIS — G35 MULTIPLE SCLEROSIS: ICD-10-CM

## 2023-08-29 DIAGNOSIS — M79.2 NEUROPATHIC PAIN: ICD-10-CM

## 2023-08-29 DIAGNOSIS — Z79.899 HIGH RISK MEDICATION USE: ICD-10-CM

## 2023-08-29 DIAGNOSIS — G89.4 CHRONIC PAIN SYNDROME: Primary | ICD-10-CM

## 2023-08-29 DIAGNOSIS — Z79.899 HIGH RISK MEDICATION USE: Primary | ICD-10-CM

## 2023-08-29 DIAGNOSIS — M79.18 MYOFASCIAL PAIN SYNDROME: ICD-10-CM

## 2023-08-29 RX ORDER — HYDROCODONE BITARTRATE AND ACETAMINOPHEN 7.5; 325 MG/1; MG/1
1 TABLET ORAL 3 TIMES DAILY PRN
Qty: 90 TABLET | Refills: 0 | Status: SHIPPED | OUTPATIENT
Start: 2023-08-29

## 2023-08-29 RX ORDER — HYDROCODONE BITARTRATE AND ACETAMINOPHEN 7.5; 325 MG/1; MG/1
1 TABLET ORAL 3 TIMES DAILY PRN
Qty: 90 TABLET | Refills: 0 | Status: SHIPPED | OUTPATIENT
Start: 2023-09-28

## 2023-08-29 RX ORDER — HYDROCODONE BITARTRATE AND ACETAMINOPHEN 7.5; 325 MG/1; MG/1
1 TABLET ORAL 3 TIMES DAILY PRN
Qty: 90 TABLET | Refills: 0 | Status: SHIPPED | OUTPATIENT
Start: 2023-10-28

## 2023-08-29 NOTE — PROGRESS NOTES
Subjective    CC bilateral feet and leg pain.  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  Continued chronic bilateral feet and lower leg neuropathic pain and myofascial pain constant but worse with activity.  Denies new weakness saddle anesthesia bladder bowel continence.  Chronic axial back pain without radicular pain.  Chronic polyarthralgia.  EMG/NCS bilateral lower extremity within normal limit.  Sees neurology for MS  Pain interfering with daily activity work and sleep.  Tried gabapentin did not tolerate.  Currently utilizes Lyrica but can only tolerate once a day 75 mg.  Prescribed by PCP.  Cannot take NSAIDs due to polycystic kidney disease    C-spine MRI  No abnormal cervical spinal cord signal or enhancement is seen. Mild degenerative changes at C3-C4 causing stable mild to moderate bilateral neural foraminal narrowing at this level. No significant spinal canal stenosis.  L-spine and T-spine MRI 2022 Probable old demyelinating plaque in the posterior thoracic cord at the T8 level with no abnormal cervical or thoracic cord enhancement to suggest active demyelination. No new demyelinating plaque is identified. At C3-C4, left greater than right uncinate process hypertrophy results in mild to moderate left neural foraminal narrowing.  Otherwise, no significant spinal canal stenosis or neural foraminal narrowing.    Pain Assessment   Location of Pain:  ana Leg, joint, back  Description of Pain: Dull/Aching, Throbbing, Stabbing  Previous Pain Rating :6  Current Pain Ratin  Aggravating Factors: Activity  Alleviating Factors: Rest, Medication    PEG Assessment   What number best describes your pain on average in the past week?3  What number best describes how, during the past week, pain has interfered with your enjoyment of life?4  What number best describes how, during the past week, pain has interfered with your general  activity?8     The following portions of the patient's history were reviewed and updated as appropriate: allergies, current medications, past family history, past medical history, past social history, past surgical history and problem list.     has a past medical history of Arthritis, COVID-19, Depression, Guillain Barr‚ syndrome, Hand numbness, High blood pressure, Leg pain, Multiple sclerosis, Optic neuritis, Pain in both feet, Pneumonia, Polycystic kidney disease, and Right shoulder pain (2022).   has a past surgical history that includes Vasectomy; Cyst Removal; Shoulder surgery (Left, 2018); and Shoulder arthroscopy (Right, 2022).  family history includes Cancer in an other family member; Dementia in his mother; Diabetes in an other family member; Hyperlipidemia in his father; Transient ischemic attack in his father.  Social History     Tobacco Use    Smoking status: Former     Types: Cigarettes     Quit date: 2010     Years since quittin.6    Smokeless tobacco: Never   Substance Use Topics    Alcohol use: Not Currently     Comment: occ       Review of Systems   Musculoskeletal:  Positive for arthralgias, back pain and myalgias.   All other systems reviewed and are negative.  Objective   Physical Exam   Constitutional: No distress.   Pulmonary/Chest: Effort normal.   Neurological: A sensory deficit is present.   Vitals reviewed.  PHQ 9 on chart  Opioid risk tool moderate risk (age)    Assessment & Plan    Diagnoses and all orders for this visit:    1. Chronic pain syndrome (Primary)  -     HYDROcodone-acetaminophen (NORCO) 7.5-325 MG per tablet; Take 1 tablet by mouth 3 (Three) Times a Day As Needed for Severe Pain.  Dispense: 90 tablet; Refill: 0  -     HYDROcodone-acetaminophen (NORCO) 7.5-325 MG per tablet; Take 1 tablet by mouth 3 (Three) Times a Day As Needed for Severe Pain. DNF before 2023  Dispense: 90 tablet; Refill: 0  -     HYDROcodone-acetaminophen (NORCO) 7.5-325 MG per  tablet; Take 1 tablet by mouth 3 (Three) Times a Day As Needed for Severe Pain. DNF before 10/28/2023  Dispense: 90 tablet; Refill: 0    2. Multiple sclerosis    3. Neuropathic pain    4. Myofascial pain syndrome    5. High risk medication use    Summary  Andrea Dumont is a 48 y.o. male with multiple sclerosis, peripheral neuropathy here for f/u.   Chronic bilateral lower extremity neuropathic pain/myofascial pain related to MS.   Cannot take NSAIDs due to polycystic kidney disease.  EMG/NCS bilateral lower extremity within normal limit.  Keeps follow up with neurology.    Continue bilateral feet and ankle neuropathic pain and paresthesia.  Utilizing Lyrica as needed.  Good relief and functional benefit with hydrocodone denies any side effects.    Continue hydrocodone 7.5/325 3 daily as needed for severe pain.  UDS and Inspect reviewed.   Discussed risk of tolerance, dependence, respiratory depression, coma and death associated with use of oral opioids for treatment of chronic nonmalignant pain.     RTC 2-3  month

## 2023-09-27 ENCOUNTER — TELEPHONE (OUTPATIENT)
Dept: PAIN MEDICINE | Facility: CLINIC | Age: 48
End: 2023-09-27
Payer: COMMERCIAL

## 2023-09-27 DIAGNOSIS — G89.4 CHRONIC PAIN SYNDROME: ICD-10-CM

## 2023-09-27 RX ORDER — HYDROCODONE BITARTRATE AND ACETAMINOPHEN 7.5; 325 MG/1; MG/1
1 TABLET ORAL 3 TIMES DAILY PRN
Qty: 90 TABLET | Refills: 0 | Status: CANCELLED | OUTPATIENT
Start: 2023-09-28

## 2023-09-27 NOTE — TELEPHONE ENCOUNTER
"Last Written Prescription Date:  4/20/22  Last Fill Quantity: 60,  # refills: 11   Last office visit provider:  10/21/22     Requested Prescriptions   Pending Prescriptions Disp Refills     famotidine (PEPCID) 20 MG tablet [Pharmacy Med Name: FAMOTIDINE 20MG TABLETS] 60 tablet 11     Sig: TAKE 1 TABLET(20 MG) BY MOUTH TWICE DAILY AS NEEDED FOR STOMACH PAIN       H2 Blockers Protocol Passed - 4/22/2023  6:02 AM        Passed - Patient is age 12 or older        Passed - Recent (12 mo) or future (30 days) visit within the authorizing provider's specialty     Patient has had an office visit with the authorizing provider or a provider within the authorizing providers department within the previous 12 mos or has a future within next 30 days. See \"Patient Info\" tab in inbasket, or \"Choose Columns\" in Meds & Orders section of the refill encounter.              Passed - Medication is active on med list           cetirizine (ZYRTEC) 10 MG tablet [Pharmacy Med Name: CETIRIZINE 10MG TABLETS] 90 tablet 3     Sig: TAKE 1 TABLET(10 MG) BY MOUTH DAILY       Antihistamines Protocol Passed - 4/22/2023  6:02 AM        Passed - Patient is 3-64 years of age     Apply weight-based dosing for peds patients age 3 - 12 years of age.    Forward request to provider for patients under the age of 3 or over the age of 64.          Passed - Recent (12 mo) or future (30 days) visit within the authorizing provider's specialty     Patient has had an office visit with the authorizing provider or a provider within the authorizing providers department within the previous 12 mos or has a future within next 30 days. See \"Patient Info\" tab in inbasket, or \"Choose Columns\" in Meds & Orders section of the refill encounter.              Passed - Medication is active on med list             Radha Ibarra 04/22/23 4:42 PM  " 9/27/23  Dr ROSA-- pt called left vm-- is having a bad flare up of his MS, difficult to walk, has been going on the past 2 days-- was wanting  medication of Hydrocodone that is due to fill today-  changed to today date to fill due to his flare up-- will you  send refill for today instead of tomorrow-- Kristian Gruber on the Hydrocodone acet  7.5-325-- when sending change date to today 9/27--- pt wants called back when done  to let him know [phone]

## 2023-09-27 NOTE — TELEPHONE ENCOUNTER
9/27/23-- dr sorto-- pt called back left vm - he was called into work today and will not be able to  medication until tomorrow now-- no need to change the date now will  9/28/23 at [facility] con

## 2023-10-31 ENCOUNTER — OFFICE VISIT (OUTPATIENT)
Dept: PAIN MEDICINE | Facility: CLINIC | Age: 48
End: 2023-10-31
Payer: COMMERCIAL

## 2023-10-31 VITALS
SYSTOLIC BLOOD PRESSURE: 142 MMHG | RESPIRATION RATE: 16 BRPM | HEART RATE: 84 BPM | DIASTOLIC BLOOD PRESSURE: 76 MMHG | OXYGEN SATURATION: 98 %

## 2023-10-31 DIAGNOSIS — Z79.899 HIGH RISK MEDICATION USE: ICD-10-CM

## 2023-10-31 DIAGNOSIS — M79.2 NEUROPATHIC PAIN: ICD-10-CM

## 2023-10-31 DIAGNOSIS — G89.4 CHRONIC PAIN SYNDROME: Primary | ICD-10-CM

## 2023-10-31 DIAGNOSIS — G35 MULTIPLE SCLEROSIS: ICD-10-CM

## 2023-10-31 DIAGNOSIS — M79.18 MYOFASCIAL PAIN SYNDROME: ICD-10-CM

## 2023-10-31 RX ORDER — HYDROCODONE BITARTRATE AND ACETAMINOPHEN 7.5; 325 MG/1; MG/1
1 TABLET ORAL 3 TIMES DAILY PRN
Qty: 90 TABLET | Refills: 0 | Status: SHIPPED | OUTPATIENT
Start: 2023-11-27

## 2023-10-31 RX ORDER — HYDROCODONE BITARTRATE AND ACETAMINOPHEN 7.5; 325 MG/1; MG/1
1 TABLET ORAL 3 TIMES DAILY PRN
Qty: 90 TABLET | Refills: 0 | Status: SHIPPED | OUTPATIENT
Start: 2023-12-26

## 2023-10-31 NOTE — PROGRESS NOTES
Subjective    CC bilateral feet and leg pain.  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  Denies any new issues, continued lower extremity neuropathic pain related to MS.  Otherwise has been stable and has follow-up with neuro.  Chronic bilateral feet and lower leg neuropathic pain and myofascial pain constant but worse with activity.  Denies new weakness saddle anesthesia bladder bowel continence.  Chronic axial back pain without radicular pain.  Chronic polyarthralgia.  EMG/NCS bilateral lower extremity within normal limit.  Sees neurology for MS  Pain interfering with daily activity work and sleep.  Tried gabapentin did not tolerate.  Currently utilizes Lyrica but can only tolerate once a day 75 mg.  Prescribed by PCP.  Cannot take NSAIDs due to polycystic kidney disease    C-spine MRI  No abnormal cervical spinal cord signal or enhancement is seen. Mild degenerative changes at C3-C4 causing stable mild to moderate bilateral neural foraminal narrowing at this level. No significant spinal canal stenosis.  L-spine and T-spine MRI 2022 Probable old demyelinating plaque in the posterior thoracic cord at the T8 level with no abnormal cervical or thoracic cord enhancement to suggest active demyelination. No new demyelinating plaque is identified. At C3-C4, left greater than right uncinate process hypertrophy results in mild to moderate left neural foraminal narrowing.  Otherwise, no significant spinal canal stenosis or neural foraminal narrowing.    Pain Assessment   Location of Pain:  ana Leg, joint, back  Description of Pain: Dull/Aching, Throbbing, Stabbing  Previous Pain Rating :6  Current Pain Ratin  Aggravating Factors: Activity  Alleviating Factors: Rest, Medication    PEG Assessment   What number best describes your pain on average in the past week?3  What number best describes how, during the past week, pain has interfered  with your enjoyment of life?5  What number best describes how, during the past week, pain has interfered with your general activity?8     The following portions of the patient's history were reviewed and updated as appropriate: allergies, current medications, past family history, past medical history, past social history, past surgical history and problem list.     has a past medical history of Arthritis, COVID-19, Depression, Guillain Barré syndrome, Hand numbness, High blood pressure, Leg pain, Multiple sclerosis, Optic neuritis, Pain in both feet, Pneumonia, Polycystic kidney disease, and Right shoulder pain (2022).   has a past surgical history that includes Vasectomy; Cyst Removal; Shoulder surgery (Left, 2018); and Shoulder arthroscopy (Right, 2022).  family history includes Cancer in an other family member; Dementia in his mother; Diabetes in an other family member; Hyperlipidemia in his father; Transient ischemic attack in his father.  Social History     Tobacco Use    Smoking status: Former     Types: Cigarettes     Quit date: 2010     Years since quittin.8    Smokeless tobacco: Never   Substance Use Topics    Alcohol use: Not Currently     Comment: occ       Review of Systems   Musculoskeletal:  Positive for arthralgias, back pain and myalgias.   All other systems reviewed and are negative.    Objective   Physical Exam   Constitutional: No distress.   Pulmonary/Chest: Effort normal.   Neurological: A sensory deficit is present.   Vitals reviewed.    PHQ 9 on chart  Opioid risk tool moderate risk (age)    Assessment & Plan    Diagnoses and all orders for this visit:    1. Chronic pain syndrome (Primary)  -     HYDROcodone-acetaminophen (NORCO) 7.5-325 MG per tablet; Take 1 tablet by mouth 3 (Three) Times a Day As Needed for Severe Pain. DNF before 2023  Dispense: 90 tablet; Refill: 0  -     HYDROcodone-acetaminophen (NORCO) 7.5-325 MG per tablet; Take 1 tablet by mouth 3 (Three)  Times a Day As Needed for Severe Pain. DNF before 12/26/2023  Dispense: 90 tablet; Refill: 0    2. Multiple sclerosis    3. Neuropathic pain    4. Myofascial pain syndrome    5. High risk medication use    Summary  Andrea Dumont is a 48 y.o. male with multiple sclerosis, peripheral neuropathy here for f/u.   Chronic bilateral lower extremity neuropathic pain/myofascial pain related to MS.   Cannot take NSAIDs due to polycystic kidney disease.  EMG/NCS bilateral lower extremity within normal limit.  Keeps follow up with neurology.    Denies any new issues, continued lower extremity neuropathic pain related to MS.  Otherwise has been stable and has follow-up with neuro.  Good relief of hydrocodone without side effects.    Continue hydrocodone 7.5/325 3 daily as needed for severe pain.  UDS and Inspect reviewed.   Discussed risk of tolerance, dependence, respiratory depression, coma and death associated with use of oral opioids for treatment of chronic nonmalignant pain.     RTC 2-3  month

## 2024-01-25 ENCOUNTER — OFFICE VISIT (OUTPATIENT)
Dept: PAIN MEDICINE | Facility: CLINIC | Age: 49
End: 2024-01-25
Payer: COMMERCIAL

## 2024-01-25 VITALS
BODY MASS INDEX: 32.5 KG/M2 | OXYGEN SATURATION: 97 % | RESPIRATION RATE: 16 BRPM | DIASTOLIC BLOOD PRESSURE: 95 MMHG | SYSTOLIC BLOOD PRESSURE: 133 MMHG | WEIGHT: 233 LBS | HEART RATE: 85 BPM

## 2024-01-25 DIAGNOSIS — G35 MULTIPLE SCLEROSIS: ICD-10-CM

## 2024-01-25 DIAGNOSIS — M79.18 MYOFASCIAL PAIN SYNDROME: ICD-10-CM

## 2024-01-25 DIAGNOSIS — G89.4 CHRONIC PAIN SYNDROME: ICD-10-CM

## 2024-01-25 DIAGNOSIS — M79.2 NEUROPATHIC PAIN: ICD-10-CM

## 2024-01-25 DIAGNOSIS — Z79.899 HIGH RISK MEDICATION USE: Primary | ICD-10-CM

## 2024-01-25 RX ORDER — BUPROPION HYDROCHLORIDE 300 MG/1
1 TABLET ORAL DAILY
COMMUNITY
Start: 2024-01-10

## 2024-01-25 RX ORDER — HYDROCODONE BITARTRATE AND ACETAMINOPHEN 7.5; 325 MG/1; MG/1
1 TABLET ORAL 3 TIMES DAILY PRN
Qty: 90 TABLET | Refills: 0 | Status: SHIPPED | OUTPATIENT
Start: 2024-02-24

## 2024-01-25 RX ORDER — HYDROCODONE BITARTRATE AND ACETAMINOPHEN 7.5; 325 MG/1; MG/1
1 TABLET ORAL 3 TIMES DAILY PRN
Qty: 90 TABLET | Refills: 0 | Status: SHIPPED | OUTPATIENT
Start: 2024-01-25

## 2024-01-25 RX ORDER — HYDROCODONE BITARTRATE AND ACETAMINOPHEN 7.5; 325 MG/1; MG/1
1 TABLET ORAL 3 TIMES DAILY PRN
Qty: 90 TABLET | Refills: 0 | Status: SHIPPED | OUTPATIENT
Start: 2024-03-24

## 2024-01-25 RX ORDER — TADALAFIL 10 MG/1
10 TABLET ORAL DAILY
COMMUNITY
Start: 2023-12-08

## 2024-01-25 NOTE — PROGRESS NOTES
Subjective    CC bilateral feet and leg pain.  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  Continued chronic bilateral feet and lower leg neuropathic pain and myofascial pain constant but worse with activity.  Denies new weakness saddle anesthesia bladder bowel continence.  Chronic axial back pain without radicular pain.  Chronic polyarthralgia.  EMG/NCS bilateral lower extremity within normal limit.  Sees neurology for MS  Pain interfering with daily activity work and sleep.  Tried gabapentin did not tolerate.  Currently utilizes Lyrica but can only tolerate once a day 75 mg.  Prescribed by PCP.  Cannot take NSAIDs due to polycystic kidney disease    C-spine MRI  No abnormal cervical spinal cord signal or enhancement is seen. Mild degenerative changes at C3-C4 causing stable mild to moderate bilateral neural foraminal narrowing at this level. No significant spinal canal stenosis.  L-spine and T-spine MRI 2022 Probable old demyelinating plaque in the posterior thoracic cord at the T8 level with no abnormal cervical or thoracic cord enhancement to suggest active demyelination. No new demyelinating plaque is identified. At C3-C4, left greater than right uncinate process hypertrophy results in mild to moderate left neural foraminal narrowing.  Otherwise, no significant spinal canal stenosis or neural foraminal narrowing.    Pain Assessment   Location of Pain:  ana Leg, joint, back  Description of Pain: Dull/Aching, Throbbing, Stabbing  Previous Pain Rating :6  Current Pain Ratin  Aggravating Factors: Activity  Alleviating Factors: Rest, Medication    PEG Assessment   What number best describes your pain on average in the past week?3  What number best describes how, during the past week, pain has interfered with your enjoyment of life?4  What number best describes how, during the past week, pain has interfered with your general  activity? 7    The following portions of the patient's history were reviewed and updated as appropriate: allergies, current medications, past family history, past medical history, past social history, past surgical history and problem list.     has a past medical history of Arthritis, COVID-19, Depression, Guillain Barré syndrome, Hand numbness, High blood pressure, Leg pain, Multiple sclerosis, Optic neuritis, Pain in both feet, Pneumonia, Polycystic kidney disease, and Right shoulder pain (08/2022).   has a past surgical history that includes Vasectomy; Cyst Removal; Shoulder surgery (Left, 02/22/2018); and Shoulder arthroscopy (Right, 8/26/2022).  family history includes Cancer in an other family member; Dementia in his mother; Diabetes in an other family member; Hyperlipidemia in his father; Transient ischemic attack in his father.    Review of Systems   Musculoskeletal:  Positive for arthralgias, back pain and myalgias.   All other systems reviewed and are negative.    Objective   Physical Exam   Constitutional: No distress.   Pulmonary/Chest: Effort normal.   Neurological: A sensory deficit is present.   Vitals reviewed.    PHQ 9 on chart  Opioid risk tool moderate risk (age)    Assessment & Plan    Diagnoses and all orders for this visit:    1. Chronic pain syndrome (Primary)  -     HYDROcodone-acetaminophen (NORCO) 7.5-325 MG per tablet; Take 1 tablet by mouth 3 (Three) Times a Day As Needed for Severe Pain.  Dispense: 90 tablet; Refill: 0  -     HYDROcodone-acetaminophen (NORCO) 7.5-325 MG per tablet; Take 1 tablet by mouth 3 (Three) Times a Day As Needed for Severe Pain. DNF before 2/24/2024  Dispense: 90 tablet; Refill: 0  -     HYDROcodone-acetaminophen (NORCO) 7.5-325 MG per tablet; Take 1 tablet by mouth 3 (Three) Times a Day As Needed for Severe Pain. DNF before 3/24/2024  Dispense: 90 tablet; Refill: 0    2. Multiple sclerosis    3. Neuropathic pain    4. Myofascial pain syndrome    5. High risk  medication use    Summary  Andrea Dumont is a 48 y.o. male with multiple sclerosis, peripheral neuropathy here for f/u.   Chronic bilateral lower extremity neuropathic pain/myofascial pain related to MS.   Cannot take NSAIDs due to polycystic kidney disease.  EMG/NCS bilateral lower extremity within normal limit.  Keeps follow up with neurology.    Continue hydrocodone 7.5/325 3 daily as needed for severe pain.  UDS and Inspect reviewed.   Discussed risk of tolerance, dependence, respiratory depression, coma and death associated with use of oral opioids for treatment of chronic nonmalignant pain.     RTC 2-3  month

## 2024-04-24 ENCOUNTER — OFFICE VISIT (OUTPATIENT)
Dept: PAIN MEDICINE | Facility: CLINIC | Age: 49
End: 2024-04-24
Payer: COMMERCIAL

## 2024-04-24 VITALS
HEART RATE: 84 BPM | BODY MASS INDEX: 32.85 KG/M2 | DIASTOLIC BLOOD PRESSURE: 94 MMHG | WEIGHT: 235.5 LBS | OXYGEN SATURATION: 98 % | SYSTOLIC BLOOD PRESSURE: 144 MMHG | RESPIRATION RATE: 16 BRPM

## 2024-04-24 DIAGNOSIS — Z79.899 HIGH RISK MEDICATION USE: ICD-10-CM

## 2024-04-24 DIAGNOSIS — M79.2 NEUROPATHIC PAIN: ICD-10-CM

## 2024-04-24 DIAGNOSIS — G89.4 CHRONIC PAIN SYNDROME: Primary | ICD-10-CM

## 2024-04-24 DIAGNOSIS — M79.18 MYOFASCIAL PAIN SYNDROME: ICD-10-CM

## 2024-04-24 DIAGNOSIS — G35 MULTIPLE SCLEROSIS: ICD-10-CM

## 2024-04-24 PROCEDURE — 99214 OFFICE O/P EST MOD 30 MIN: CPT | Performed by: ANESTHESIOLOGY

## 2024-04-24 RX ORDER — HYDROCODONE BITARTRATE AND ACETAMINOPHEN 7.5; 325 MG/1; MG/1
1 TABLET ORAL 4 TIMES DAILY PRN
Qty: 120 TABLET | Refills: 0 | Status: SHIPPED | OUTPATIENT
Start: 2024-04-24

## 2024-04-24 RX ORDER — HYDROCODONE BITARTRATE AND ACETAMINOPHEN 7.5; 325 MG/1; MG/1
1 TABLET ORAL 4 TIMES DAILY PRN
Qty: 120 TABLET | Refills: 0 | Status: SHIPPED | OUTPATIENT
Start: 2024-05-24

## 2024-04-24 NOTE — PROGRESS NOTES
Subjective    CC bilateral feet and leg pain.  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  Denies new issues.  Hydrocodone helping however not lasting long enough.  Having to take an extra 1 most days  Chronic bilateral feet and lower leg neuropathic pain and myofascial pain constant but worse with activity.  Denies new weakness saddle anesthesia bladder bowel continence.  Chronic axial back pain without radicular pain.  Chronic polyarthralgia.  EMG/NCS bilateral lower extremity within normal limit.  Sees neurology for MS  Pain interfering with daily activity work and sleep.  Tried gabapentin did not tolerate.  Currently utilizes Lyrica but can only tolerate once a day 75 mg.  Prescribed by PCP.  Cannot take NSAIDs due to polycystic kidney disease    C-spine MRI  No abnormal cervical spinal cord signal or enhancement is seen. Mild degenerative changes at C3-C4 causing stable mild to moderate bilateral neural foraminal narrowing at this level. No significant spinal canal stenosis.  L-spine and T-spine MRI 2022 Probable old demyelinating plaque in the posterior thoracic cord at the T8 level with no abnormal cervical or thoracic cord enhancement to suggest active demyelination. No new demyelinating plaque is identified. At C3-C4, left greater than right uncinate process hypertrophy results in mild to moderate left neural foraminal narrowing.  Otherwise, no significant spinal canal stenosis or neural foraminal narrowing.    Pain Assessment   Location of Pain:  ana Leg, joint, back  Description of Pain: Dull/Aching, Throbbing, Stabbing  Previous Pain Rating :4  Current Pain Ratin  Aggravating Factors: Activity  Alleviating Factors: Rest, Medication    PEG Assessment   What number best describes your pain on average in the past week?3  What number best describes how, during the past week, pain has interfered with your enjoyment of  life?4  What number best describes how, during the past week, pain has interfered with your general activity? 8    The following portions of the patient's history were reviewed and updated as appropriate: allergies, current medications, past family history, past medical history, past social history, past surgical history and problem list.     has a past medical history of Arthritis, COVID-19, Depression, Guillain Barré syndrome, Hand numbness, High blood pressure, Leg pain, Multiple sclerosis, Optic neuritis, Pain in both feet, Pneumonia, Polycystic kidney disease, and Right shoulder pain (08/2022).   has a past surgical history that includes Vasectomy; Cyst Removal; Shoulder surgery (Left, 02/22/2018); and Shoulder arthroscopy (Right, 8/26/2022).  family history includes Cancer in an other family member; Dementia in his mother; Diabetes in an other family member; Hyperlipidemia in his father; Transient ischemic attack in his father.    Review of Systems   Musculoskeletal:  Positive for arthralgias, back pain and myalgias.   All other systems reviewed and are negative.    Objective   Physical Exam   Constitutional: No distress.   Pulmonary/Chest: Effort normal.   Neurological: A sensory deficit is present.   Vitals reviewed.    PHQ 9 on chart  Opioid risk tool moderate risk (age)    Assessment & Plan    Diagnoses and all orders for this visit:    1. Chronic pain syndrome (Primary)  -     HYDROcodone-acetaminophen (NORCO) 7.5-325 MG per tablet; Take 1 tablet by mouth 4 (Four) Times a Day As Needed for Severe Pain.  Dispense: 120 tablet; Refill: 0  -     HYDROcodone-acetaminophen (NORCO) 7.5-325 MG per tablet; Take 1 tablet by mouth 4 (Four) Times a Day As Needed for Severe Pain. DNF before 5/24/2024  Dispense: 120 tablet; Refill: 0    2. Multiple sclerosis    3. Neuropathic pain    4. Myofascial pain syndrome    5. High risk medication use  -     Urine Drug Screen - Urine, Clean Catch; Future    Summary  Andrea  Tim is a 49 y.o. male with multiple sclerosis, peripheral neuropathy here for f/u.   Chronic bilateral lower extremity neuropathic pain/myofascial pain related to MS.   Cannot take NSAIDs due to polycystic kidney disease.  EMG/NCS bilateral lower extremity within normal limit.  Keeps follow up with neurology.    Denies new issues.  Hydrocodone helping however not lasting long enough.  Having to take an extra 1 most days  Will continue hydrocodone and increase to 4 times daily.    Continue hydrocodone 7.5/325 3 daily as needed for severe pain.  UDS and Inspect reviewed.   Discussed risk of tolerance, dependence, respiratory depression, coma and death associated with use of oral opioids for treatment of chronic nonmalignant pain.     RTC 2-3  month

## 2024-05-10 ENCOUNTER — OFFICE VISIT (OUTPATIENT)
Dept: FAMILY MEDICINE CLINIC | Facility: CLINIC | Age: 49
End: 2024-05-10
Payer: COMMERCIAL

## 2024-05-10 VITALS
HEART RATE: 92 BPM | OXYGEN SATURATION: 95 % | BODY MASS INDEX: 33.18 KG/M2 | SYSTOLIC BLOOD PRESSURE: 127 MMHG | WEIGHT: 237 LBS | TEMPERATURE: 98.4 F | DIASTOLIC BLOOD PRESSURE: 84 MMHG | HEIGHT: 71 IN

## 2024-05-10 DIAGNOSIS — N52.9 ERECTILE DYSFUNCTION, UNSPECIFIED ERECTILE DYSFUNCTION TYPE: ICD-10-CM

## 2024-05-10 DIAGNOSIS — Z12.11 COLON CANCER SCREENING: ICD-10-CM

## 2024-05-10 DIAGNOSIS — Q61.2 ADULT POLYCYSTIC KIDNEY DISEASE: ICD-10-CM

## 2024-05-10 DIAGNOSIS — M19.011 DEGENERATIVE JOINT DISEASE OF RIGHT ACROMIOCLAVICULAR JOINT: ICD-10-CM

## 2024-05-10 DIAGNOSIS — I15.9 SECONDARY HYPERTENSION: ICD-10-CM

## 2024-05-10 DIAGNOSIS — E66.9 OBESITY (BMI 30-39.9): Primary | ICD-10-CM

## 2024-05-10 DIAGNOSIS — G35 MULTIPLE SCLEROSIS: ICD-10-CM

## 2024-05-10 DIAGNOSIS — E55.9 VITAMIN D DEFICIENCY: ICD-10-CM

## 2024-05-10 DIAGNOSIS — D22.9 NUMEROUS MOLES: ICD-10-CM

## 2024-05-10 DIAGNOSIS — Z76.89 ENCOUNTER TO ESTABLISH CARE: ICD-10-CM

## 2024-05-10 DIAGNOSIS — F90.9 ATTENTION DEFICIT HYPERACTIVITY DISORDER (ADHD), UNSPECIFIED ADHD TYPE: ICD-10-CM

## 2024-05-10 DIAGNOSIS — M77.9 TENDONITIS: ICD-10-CM

## 2024-05-10 PROBLEM — I10 HYPERTENSION: Status: ACTIVE | Noted: 2024-05-10

## 2024-05-10 PROBLEM — F98.8 ADD (ATTENTION DEFICIT DISORDER): Status: ACTIVE | Noted: 2024-05-10

## 2024-05-10 PROBLEM — F32.A DEPRESSION: Status: ACTIVE | Noted: 2024-05-10

## 2024-05-10 PROBLEM — M19.90 OSTEOARTHRITIS: Status: RESOLVED | Noted: 2024-05-10 | Resolved: 2024-05-10

## 2024-05-10 PROBLEM — M19.90 OSTEOARTHRITIS: Status: ACTIVE | Noted: 2024-05-10

## 2024-05-10 RX ORDER — LISDEXAMFETAMINE DIMESYLATE 10 MG/1
10 TABLET, CHEWABLE ORAL DAILY
Qty: 30 TABLET | Refills: 0 | Status: SHIPPED | OUTPATIENT
Start: 2024-05-10

## 2024-05-14 ENCOUNTER — TELEPHONE (OUTPATIENT)
Dept: FAMILY MEDICINE CLINIC | Facility: CLINIC | Age: 49
End: 2024-05-14
Payer: COMMERCIAL

## 2024-05-14 NOTE — TELEPHONE ENCOUNTER
Caller: Andrea Dumont    Relationship: Self    Best call back number: [phone]     Which medication are you concerned about:     Lisdexamfetamine Dimesylate (Vyvanse) 10 MG chewable tablet       What are your concerns: PLEASE ADVISE, THIS NEEDS A PRIOR AUTHORIZATION DUE TO PATIENT'S AGE. INSURANCE WILL NOT COVER WITHOUT THIS.    PLEASE CALL PATIENT WHEN THIS HAS BEEN TAKEN CARE OF.

## 2024-05-15 ENCOUNTER — PRIOR AUTHORIZATION (OUTPATIENT)
Dept: FAMILY MEDICINE CLINIC | Facility: CLINIC | Age: 49
End: 2024-05-15
Payer: COMMERCIAL

## 2024-05-15 NOTE — TELEPHONE ENCOUNTER
HUB to relay     PA approved for Lisdexamfetamine Dimesylate 10MG chewable tablets     PA approval was faxed to [facility] in [place]     Outcome  Approved today  PA Case: [id number], Status: Approved, Coverage Starts on: 5/15/2024 12:00 AM, Coverage Ends on: 5/15/2025 12:00 AM. Questions? Contact [phone].  Authorization Expiration Date: 5/14/2025

## 2024-05-15 NOTE — TELEPHONE ENCOUNTER
HUB to read    PA was sent for Lisdexamfetamine Dimesylate 10MG chewable tablets    Waiting on the outcome from insurance.

## 2024-05-15 NOTE — TELEPHONE ENCOUNTER
HUB to read    PA approved for Lisdexamfetamine Dimesylate 10MG chewable tablets    PA approval was faxed to [facility] in [place]    Outcome  Approved today  PA Case: [id number], Status: Approved, Coverage Starts on: 5/15/2024 12:00 AM, Coverage Ends on: 5/15/2025 12:00 AM. Questions? Contact [phone].  Authorization Expiration Date: 5/14/2025

## 2024-05-15 NOTE — TELEPHONE ENCOUNTER
HUB to relay     PA was sent for Lisdexamfetamine Dimesylate 10MG chewable tablets     Waiting on the outcome from insurance.

## 2024-06-15 DIAGNOSIS — F90.9 ATTENTION DEFICIT HYPERACTIVITY DISORDER (ADHD), UNSPECIFIED ADHD TYPE: ICD-10-CM

## 2024-06-17 RX ORDER — LISDEXAMFETAMINE DIMESYLATE 10 MG/1
1 TABLET, CHEWABLE ORAL DAILY
Qty: 30 TABLET | Refills: 0 | Status: SHIPPED | OUTPATIENT
Start: 2024-06-17

## 2024-06-17 NOTE — TELEPHONE ENCOUNTER
INSPECT RAN    Rx Refill Note  Requested Prescriptions     Pending Prescriptions Disp Refills    Lisdexamfetamine Dimesylate 10 MG chewable tablet [Pharmacy Med Name: Lisdexamfetamine Dimesylate Oral Tablet Chewable 10 MG] 30 tablet 0     Sig: CHEW AND SWALLOW 1 TABLET BY MOUTH EVERY DAY      Last office visit with prescribing clinician: 5/10/2024   Last telemedicine visit with prescribing clinician: Visit date not found   Next office visit with prescribing clinician: 8/9/2024                         Would you like a call back once the refill request has been completed: [] Yes [] No    If the office needs to give you a call back, can they leave a voicemail: [] Yes [] No    Mague Myles, RT  06/17/24, 11:12 EDT

## 2024-06-24 DIAGNOSIS — G89.4 CHRONIC PAIN SYNDROME: ICD-10-CM

## 2024-06-24 RX ORDER — HYDROCODONE BITARTRATE AND ACETAMINOPHEN 7.5; 325 MG/1; MG/1
TABLET ORAL
Qty: 120 TABLET | Refills: 0 | OUTPATIENT
Start: 2024-06-24

## 2024-06-24 RX ORDER — HYDROCODONE BITARTRATE AND ACETAMINOPHEN 7.5; 325 MG/1; MG/1
1 TABLET ORAL 4 TIMES DAILY PRN
Qty: 120 TABLET | Refills: 0 | Status: SHIPPED | OUTPATIENT
Start: 2024-06-24

## 2024-07-17 DIAGNOSIS — F90.9 ATTENTION DEFICIT HYPERACTIVITY DISORDER (ADHD), UNSPECIFIED ADHD TYPE: ICD-10-CM

## 2024-07-17 NOTE — TELEPHONE ENCOUNTER
PATIENT CALLED FOR MEDICATION REFILL OF   Lisdexamfetamine Dimesylate 10 MG chewable tablet     HE HAS 2 CHEWABLES LEFT    [id number] - [place], [place] - [address] [place] - [phone]  - [phone]  [phone]     CALL BACK NUMBER [phone]   No

## 2024-07-18 ENCOUNTER — OFFICE VISIT (OUTPATIENT)
Dept: PAIN MEDICINE | Facility: CLINIC | Age: 49
End: 2024-07-18
Payer: COMMERCIAL

## 2024-07-18 VITALS
DIASTOLIC BLOOD PRESSURE: 90 MMHG | HEART RATE: 82 BPM | SYSTOLIC BLOOD PRESSURE: 130 MMHG | WEIGHT: 237 LBS | RESPIRATION RATE: 16 BRPM | BODY MASS INDEX: 33.07 KG/M2 | OXYGEN SATURATION: 98 %

## 2024-07-18 DIAGNOSIS — M79.18 MYOFASCIAL PAIN SYNDROME: ICD-10-CM

## 2024-07-18 DIAGNOSIS — Z79.899 HIGH RISK MEDICATION USE: ICD-10-CM

## 2024-07-18 DIAGNOSIS — G89.4 CHRONIC PAIN SYNDROME: Primary | ICD-10-CM

## 2024-07-18 DIAGNOSIS — M79.2 NEUROPATHIC PAIN: ICD-10-CM

## 2024-07-18 DIAGNOSIS — G35 MULTIPLE SCLEROSIS: ICD-10-CM

## 2024-07-18 RX ORDER — HYDROCODONE BITARTRATE AND ACETAMINOPHEN 7.5; 325 MG/1; MG/1
1 TABLET ORAL 4 TIMES DAILY PRN
Qty: 120 TABLET | Refills: 0 | Status: SHIPPED | OUTPATIENT
Start: 2024-07-23 | End: 2024-07-21 | Stop reason: SDUPTHER

## 2024-07-18 RX ORDER — LISDEXAMFETAMINE DIMESYLATE 10 MG/1
1 TABLET, CHEWABLE ORAL DAILY
Qty: 30 TABLET | Refills: 0 | Status: SHIPPED | OUTPATIENT
Start: 2024-07-18

## 2024-07-18 RX ORDER — HYDROCODONE BITARTRATE AND ACETAMINOPHEN 7.5; 325 MG/1; MG/1
1 TABLET ORAL 4 TIMES DAILY PRN
Qty: 120 TABLET | Refills: 0 | Status: SHIPPED | OUTPATIENT
Start: 2024-09-22 | End: 2024-07-21 | Stop reason: SDUPTHER

## 2024-07-18 RX ORDER — HYDROCODONE BITARTRATE AND ACETAMINOPHEN 7.5; 325 MG/1; MG/1
1 TABLET ORAL 4 TIMES DAILY PRN
Qty: 120 TABLET | Refills: 0 | Status: SHIPPED | OUTPATIENT
Start: 2024-08-23 | End: 2024-07-22 | Stop reason: HOSPADM

## 2024-07-18 NOTE — PROGRESS NOTES
Subjective    CC bilateral feet and leg pain.  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  Better relief with hydrocodone 4 times daily.  Denies side effects.  Chronic bilateral feet and lower leg neuropathic pain and myofascial pain constant but worse with activity.  Denies new weakness saddle anesthesia bladder bowel continence.  Chronic axial back pain without radicular pain.  Chronic polyarthralgia.  EMG/NCS bilateral lower extremity within normal limit.  Sees neurology for MS  Pain interfering with daily activity work and sleep.  Tried gabapentin did not tolerate.  Currently utilizes Lyrica but can only tolerate once a day 75 mg.  Prescribed by PCP.  Cannot take NSAIDs due to polycystic kidney disease    C-spine MRI 2023 No abnormal cervical spinal cord signal or enhancement is seen. Mild degenerative changes at C3-C4 causing stable mild to moderate bilateral neural foraminal narrowing at this level. No significant spinal canal stenosis.  L-spine and T-spine MRI 5/2022 Probable old demyelinating plaque in the posterior thoracic cord at the T8 level with no abnormal cervical or thoracic cord enhancement to suggest active demyelination. No new demyelinating plaque is identified. At C3-C4, left greater than right uncinate process hypertrophy results in mild to moderate left neural foraminal narrowing.  Otherwise, no significant spinal canal stenosis or neural foraminal narrowing.    Pain Assessment   Location of Pain:  ana Leg, joint, back  Description of Pain: Dull/Aching, Throbbing, Stabbing  Previous Pain Rating :5  Current Pain Rating: 3  Aggravating Factors: Activity  Alleviating Factors: Rest, Medication    PEG Assessment   What number best describes your pain on average in the past week?3  What number best describes how, during the past week, pain has interfered with your enjoyment of life?.  What number best describes how, during  the past week, pain has interfered with your general activity? 8    The following portions of the patient's history were reviewed and updated as appropriate: allergies, current medications, past family history, past medical history, past social history, past surgical history and problem list.    Review of Systems   Musculoskeletal:  Positive for arthralgias, back pain and myalgias.   All other systems reviewed and are negative.    Objective   Physical Exam   Constitutional: No distress.   Pulmonary/Chest: Effort normal.   Neurological: A sensory deficit is present.   Vitals reviewed.    PHQ 9 on chart  Opioid risk tool moderate risk (age)    Assessment & Plan    Diagnoses and all orders for this visit:    1. Chronic pain syndrome (Primary)  -     HYDROcodone-acetaminophen (NORCO) 7.5-325 MG per tablet; Take 1 tablet by mouth 4 (Four) Times a Day As Needed for Severe Pain. DNF before 8/23/2024  Dispense: 120 tablet; Refill: 0  -     HYDROcodone-acetaminophen (NORCO) 7.5-325 MG per tablet; Take 1 tablet by mouth 4 (Four) Times a Day As Needed for Severe Pain. DNF before 9/22/2024  Dispense: 120 tablet; Refill: 0  -     HYDROcodone-acetaminophen (NORCO) 7.5-325 MG per tablet; Take 1 tablet by mouth 4 (Four) Times a Day As Needed for Severe Pain.  Dispense: 120 tablet; Refill: 0    2. Multiple sclerosis    3. Neuropathic pain    4. Myofascial pain syndrome    5. High risk medication use    Summary  Andrea Dumont is a 49 y.o. male with multiple sclerosis, peripheral neuropathy here for f/u.   Chronic bilateral lower extremity neuropathic pain/myofascial pain related to MS.   Cannot take NSAIDs due to polycystic kidney disease.  EMG/NCS bilateral lower extremity within normal limit.  Keeps follow up with neurology.    Better relief with hydrocodone 4 times daily.  Denies side effects.    Continue hydrocodone 7.5/325 4 daily as needed for severe pain.  UDS and Inspect reviewed.   Discussed risk of tolerance,  dependence, respiratory depression, coma and death associated with use of oral opioids for treatment of chronic nonmalignant pain.     RTC 2-3  month

## 2024-07-21 ENCOUNTER — HOSPITAL ENCOUNTER (OUTPATIENT)
Facility: HOSPITAL | Age: 49
Discharge: HOME | End: 2024-07-22
Attending: EMERGENCY MEDICINE | Admitting: UROLOGY
Payer: COMMERCIAL

## 2024-07-21 ENCOUNTER — APPOINTMENT (OUTPATIENT)
Dept: CT IMAGING | Facility: HOSPITAL | Age: 49
End: 2024-07-21
Payer: COMMERCIAL

## 2024-07-21 DIAGNOSIS — N20.1 URETEROLITHIASIS: Primary | ICD-10-CM

## 2024-07-21 LAB
ALBUMIN SERPL-MCNC: 4.7 G/DL (ref 3.5–5.2)
ALBUMIN/GLOB SERPL: 1.3 G/DL
ALP SERPL-CCNC: 102 U/L (ref 39–117)
ALT SERPL W P-5'-P-CCNC: 31 U/L (ref 1–41)
ANION GAP SERPL CALCULATED.3IONS-SCNC: 11.9 MMOL/L (ref 5–15)
AST SERPL-CCNC: 37 U/L (ref 1–40)
BACTERIA UR QL AUTO: ABNORMAL /HPF
BASOPHILS # BLD AUTO: 0.03 10*3/MM3 (ref 0–0.2)
BASOPHILS NFR BLD AUTO: 0.3 % (ref 0–1.5)
BILIRUB SERPL-MCNC: 0.4 MG/DL (ref 0–1.2)
BILIRUB UR QL STRIP: NEGATIVE
BUN SERPL-MCNC: 17 MG/DL (ref 6–20)
BUN/CREAT SERPL: 16.7 (ref 7–25)
CALCIUM SPEC-SCNC: 9.9 MG/DL (ref 8.6–10.5)
CHLORIDE SERPL-SCNC: 98 MMOL/L (ref 98–107)
CLARITY UR: CLEAR
CO2 SERPL-SCNC: 27.1 MMOL/L (ref 22–29)
COLOR UR: YELLOW
CREAT SERPL-MCNC: 1.02 MG/DL (ref 0.76–1.27)
DEPRECATED RDW RBC AUTO: 38.3 FL (ref 37–54)
EGFRCR SERPLBLD CKD-EPI 2021: 91.2 ML/MIN/1.73
EOSINOPHIL # BLD AUTO: 0.05 10*3/MM3 (ref 0–0.4)
EOSINOPHIL NFR BLD AUTO: 0.5 % (ref 0.3–6.2)
ERYTHROCYTE [DISTWIDTH] IN BLOOD BY AUTOMATED COUNT: 12.7 % (ref 12.3–15.4)
GLOBULIN UR ELPH-MCNC: 3.5 GM/DL
GLUCOSE SERPL-MCNC: 126 MG/DL (ref 65–99)
GLUCOSE UR STRIP-MCNC: NEGATIVE MG/DL
HCT VFR BLD AUTO: 45.6 % (ref 37.5–51)
HGB BLD-MCNC: 15.2 G/DL (ref 13–17.7)
HGB UR QL STRIP.AUTO: ABNORMAL
HYALINE CASTS UR QL AUTO: ABNORMAL /LPF
IMM GRANULOCYTES # BLD AUTO: 0.04 10*3/MM3 (ref 0–0.05)
IMM GRANULOCYTES NFR BLD AUTO: 0.4 % (ref 0–0.5)
KETONES UR QL STRIP: NEGATIVE
LEUKOCYTE ESTERASE UR QL STRIP.AUTO: ABNORMAL
LIPASE SERPL-CCNC: 29 U/L (ref 13–60)
LYMPHOCYTES # BLD AUTO: 1 10*3/MM3 (ref 0.7–3.1)
LYMPHOCYTES NFR BLD AUTO: 9.8 % (ref 19.6–45.3)
MCH RBC QN AUTO: 27.4 PG (ref 26.6–33)
MCHC RBC AUTO-ENTMCNC: 33.3 G/DL (ref 31.5–35.7)
MCV RBC AUTO: 82.3 FL (ref 79–97)
MONOCYTES # BLD AUTO: 0.71 10*3/MM3 (ref 0.1–0.9)
MONOCYTES NFR BLD AUTO: 7 % (ref 5–12)
NEUTROPHILS NFR BLD AUTO: 8.34 10*3/MM3 (ref 1.7–7)
NEUTROPHILS NFR BLD AUTO: 82 % (ref 42.7–76)
NITRITE UR QL STRIP: NEGATIVE
NRBC BLD AUTO-RTO: 0 /100 WBC (ref 0–0.2)
PH UR STRIP.AUTO: 8.5 [PH] (ref 5–8)
PLATELET # BLD AUTO: 207 10*3/MM3 (ref 140–450)
PMV BLD AUTO: 9.5 FL (ref 6–12)
POTASSIUM SERPL-SCNC: 4.1 MMOL/L (ref 3.5–5.2)
PROT SERPL-MCNC: 8.2 G/DL (ref 6–8.5)
PROT UR QL STRIP: ABNORMAL
RBC # BLD AUTO: 5.54 10*6/MM3 (ref 4.14–5.8)
RBC # UR STRIP: ABNORMAL /HPF
REF LAB TEST METHOD: ABNORMAL
SODIUM SERPL-SCNC: 137 MMOL/L (ref 136–145)
SP GR UR STRIP: 1.01 (ref 1–1.03)
SQUAMOUS #/AREA URNS HPF: ABNORMAL /HPF
UROBILINOGEN UR QL STRIP: ABNORMAL
WBC # UR STRIP: ABNORMAL /HPF
WBC NRBC COR # BLD AUTO: 10.17 10*3/MM3 (ref 3.4–10.8)

## 2024-07-21 PROCEDURE — 96376 TX/PRO/DX INJ SAME DRUG ADON: CPT

## 2024-07-21 PROCEDURE — 25010000002 HYDROMORPHONE 1 MG/ML SOLUTION: Performed by: NURSE PRACTITIONER

## 2024-07-21 PROCEDURE — 96374 THER/PROPH/DIAG INJ IV PUSH: CPT

## 2024-07-21 PROCEDURE — 25010000002 ONDANSETRON PER 1 MG: Performed by: EMERGENCY MEDICINE

## 2024-07-21 PROCEDURE — 81001 URINALYSIS AUTO W/SCOPE: CPT | Performed by: EMERGENCY MEDICINE

## 2024-07-21 PROCEDURE — 25810000003 SODIUM CHLORIDE 0.9 % SOLUTION: Performed by: NURSE PRACTITIONER

## 2024-07-21 PROCEDURE — G0378 HOSPITAL OBSERVATION PER HR: HCPCS

## 2024-07-21 PROCEDURE — 96375 TX/PRO/DX INJ NEW DRUG ADDON: CPT

## 2024-07-21 PROCEDURE — 80053 COMPREHEN METABOLIC PANEL: CPT | Performed by: EMERGENCY MEDICINE

## 2024-07-21 PROCEDURE — 85025 COMPLETE CBC W/AUTO DIFF WBC: CPT | Performed by: EMERGENCY MEDICINE

## 2024-07-21 PROCEDURE — 83690 ASSAY OF LIPASE: CPT | Performed by: EMERGENCY MEDICINE

## 2024-07-21 PROCEDURE — 25010000002 ONDANSETRON PER 1 MG: Performed by: NURSE PRACTITIONER

## 2024-07-21 PROCEDURE — 25010000002 HYDROMORPHONE 1 MG/ML SOLUTION: Performed by: EMERGENCY MEDICINE

## 2024-07-21 PROCEDURE — 99285 EMERGENCY DEPT VISIT HI MDM: CPT

## 2024-07-21 PROCEDURE — 96361 HYDRATE IV INFUSION ADD-ON: CPT

## 2024-07-21 PROCEDURE — 74176 CT ABD & PELVIS W/O CONTRAST: CPT

## 2024-07-21 RX ORDER — BISACODYL 10 MG
10 SUPPOSITORY, RECTAL RECTAL DAILY PRN
Status: DISCONTINUED | OUTPATIENT
Start: 2024-07-21 | End: 2024-07-22 | Stop reason: HOSPADM

## 2024-07-21 RX ORDER — HYDROCHLOROTHIAZIDE 12.5 MG/1
12.5 TABLET ORAL
Status: DISCONTINUED | OUTPATIENT
Start: 2024-07-21 | End: 2024-07-22 | Stop reason: HOSPADM

## 2024-07-21 RX ORDER — SODIUM CHLORIDE 0.9 % (FLUSH) 0.9 %
10 SYRINGE (ML) INJECTION AS NEEDED
Status: DISCONTINUED | OUTPATIENT
Start: 2024-07-21 | End: 2024-07-22 | Stop reason: HOSPADM

## 2024-07-21 RX ORDER — AMOXICILLIN 250 MG
2 CAPSULE ORAL 2 TIMES DAILY PRN
Status: DISCONTINUED | OUTPATIENT
Start: 2024-07-21 | End: 2024-07-22 | Stop reason: HOSPADM

## 2024-07-21 RX ORDER — ONDANSETRON 2 MG/ML
4 INJECTION INTRAMUSCULAR; INTRAVENOUS ONCE
Status: COMPLETED | OUTPATIENT
Start: 2024-07-21 | End: 2024-07-21

## 2024-07-21 RX ORDER — ONDANSETRON 4 MG/1
4 TABLET, ORALLY DISINTEGRATING ORAL EVERY 6 HOURS PRN
Status: DISCONTINUED | OUTPATIENT
Start: 2024-07-21 | End: 2024-07-22 | Stop reason: HOSPADM

## 2024-07-21 RX ORDER — TAMSULOSIN HYDROCHLORIDE 0.4 MG/1
0.4 CAPSULE ORAL ONCE
Status: COMPLETED | OUTPATIENT
Start: 2024-07-21 | End: 2024-07-21

## 2024-07-21 RX ORDER — LISINOPRIL 5 MG/1
10 TABLET ORAL
Status: DISCONTINUED | OUTPATIENT
Start: 2024-07-21 | End: 2024-07-22 | Stop reason: HOSPADM

## 2024-07-21 RX ORDER — HYDROCODONE BITARTRATE AND ACETAMINOPHEN 7.5; 325 MG/1; MG/1
1 TABLET ORAL 4 TIMES DAILY PRN
Status: DISCONTINUED | OUTPATIENT
Start: 2024-08-23 | End: 2024-07-22 | Stop reason: HOSPADM

## 2024-07-21 RX ORDER — BUPROPION HYDROCHLORIDE 150 MG/1
300 TABLET ORAL DAILY
Status: DISCONTINUED | OUTPATIENT
Start: 2024-07-22 | End: 2024-07-22 | Stop reason: HOSPADM

## 2024-07-21 RX ORDER — ALUMINA, MAGNESIA, AND SIMETHICONE 2400; 2400; 240 MG/30ML; MG/30ML; MG/30ML
15 SUSPENSION ORAL EVERY 6 HOURS PRN
Status: DISCONTINUED | OUTPATIENT
Start: 2024-07-21 | End: 2024-07-22 | Stop reason: HOSPADM

## 2024-07-21 RX ORDER — SODIUM CHLORIDE 0.9 % (FLUSH) 0.9 %
10 SYRINGE (ML) INJECTION EVERY 12 HOURS SCHEDULED
Status: DISCONTINUED | OUTPATIENT
Start: 2024-07-21 | End: 2024-07-22 | Stop reason: HOSPADM

## 2024-07-21 RX ORDER — SODIUM CHLORIDE 9 MG/ML
100 INJECTION, SOLUTION INTRAVENOUS CONTINUOUS
Status: DISCONTINUED | OUTPATIENT
Start: 2024-07-21 | End: 2024-07-22 | Stop reason: HOSPADM

## 2024-07-21 RX ORDER — NITROGLYCERIN 0.4 MG/1
0.4 TABLET SUBLINGUAL
Status: DISCONTINUED | OUTPATIENT
Start: 2024-07-21 | End: 2024-07-22 | Stop reason: HOSPADM

## 2024-07-21 RX ORDER — BISACODYL 5 MG/1
5 TABLET, DELAYED RELEASE ORAL DAILY PRN
Status: DISCONTINUED | OUTPATIENT
Start: 2024-07-21 | End: 2024-07-22 | Stop reason: HOSPADM

## 2024-07-21 RX ORDER — POLYETHYLENE GLYCOL 3350 17 G/17G
17 POWDER, FOR SOLUTION ORAL DAILY PRN
Status: DISCONTINUED | OUTPATIENT
Start: 2024-07-21 | End: 2024-07-22 | Stop reason: HOSPADM

## 2024-07-21 RX ORDER — SODIUM CHLORIDE 9 MG/ML
40 INJECTION, SOLUTION INTRAVENOUS AS NEEDED
Status: DISCONTINUED | OUTPATIENT
Start: 2024-07-21 | End: 2024-07-22 | Stop reason: HOSPADM

## 2024-07-21 RX ORDER — ONDANSETRON 2 MG/ML
4 INJECTION INTRAMUSCULAR; INTRAVENOUS EVERY 6 HOURS PRN
Status: DISCONTINUED | OUTPATIENT
Start: 2024-07-21 | End: 2024-07-22 | Stop reason: HOSPADM

## 2024-07-21 RX ADMIN — TAMSULOSIN HYDROCHLORIDE 0.4 MG: 0.4 CAPSULE ORAL at 21:33

## 2024-07-21 RX ADMIN — SODIUM CHLORIDE 100 ML/HR: 9 INJECTION, SOLUTION INTRAVENOUS at 12:46

## 2024-07-21 RX ADMIN — HYDROMORPHONE HYDROCHLORIDE 1 MG: 1 INJECTION, SOLUTION INTRAMUSCULAR; INTRAVENOUS; SUBCUTANEOUS at 11:31

## 2024-07-21 RX ADMIN — ONDANSETRON 4 MG: 2 INJECTION INTRAMUSCULAR; INTRAVENOUS at 09:24

## 2024-07-21 RX ADMIN — SODIUM CHLORIDE 100 ML/HR: 9 INJECTION, SOLUTION INTRAVENOUS at 15:57

## 2024-07-21 RX ADMIN — HYDROMORPHONE HYDROCHLORIDE 1 MG: 1 INJECTION, SOLUTION INTRAMUSCULAR; INTRAVENOUS; SUBCUTANEOUS at 09:24

## 2024-07-21 RX ADMIN — HYDROMORPHONE HYDROCHLORIDE 1 MG: 1 INJECTION, SOLUTION INTRAMUSCULAR; INTRAVENOUS; SUBCUTANEOUS at 15:58

## 2024-07-21 RX ADMIN — LISINOPRIL 10 MG: 5 TABLET ORAL at 18:44

## 2024-07-21 RX ADMIN — HYDROMORPHONE HYDROCHLORIDE 1 MG: 1 INJECTION, SOLUTION INTRAMUSCULAR; INTRAVENOUS; SUBCUTANEOUS at 18:46

## 2024-07-21 RX ADMIN — ONDANSETRON 4 MG: 2 INJECTION INTRAMUSCULAR; INTRAVENOUS at 15:57

## 2024-07-21 RX ADMIN — Medication 10 ML: at 21:04

## 2024-07-21 RX ADMIN — HYDROMORPHONE HYDROCHLORIDE 1 MG: 1 INJECTION, SOLUTION INTRAMUSCULAR; INTRAVENOUS; SUBCUTANEOUS at 21:41

## 2024-07-21 RX ADMIN — HYDROCHLOROTHIAZIDE 12.5 MG: 12.5 TABLET ORAL at 18:44

## 2024-07-21 RX ADMIN — Medication 10 ML: at 12:46

## 2024-07-21 NOTE — ED NOTES
Pt. Awake/alert, vitals stable, IVFs continue to infuse. S/o at bedside. Requesting medication for pain control

## 2024-07-21 NOTE — ED PROVIDER NOTES
Subjective   History of Present Illness  49-year-old male describes severe pain in the right flank that started overnight.  States he had had a pain in his right abdomen couple of days ago that resolved after a bowel movement.  He reports no fevers or chills.  He reports no associated vomiting or diarrhea or dysuria.  Today he reports no relieving or exacerbating factors.  Review of Systems    Past Medical History:   Diagnosis Date    Arthritis     COVID-19     2021    Depression     Guillain Barré syndrome     Hand numbness     left-- cramping    High blood pressure     Leg pain     Multiple sclerosis     Optic neuritis     Pain in both feet     Pneumonia      with covid 2021    Polycystic kidney disease     Right shoulder pain 2022       Allergies   Allergen Reactions    Codeine Hives    Nsaids Other (See Comments)     Polycystic Kidney disease-- sees nephrologist    Tramadol Hives       Past Surgical History:   Procedure Laterality Date    CYST REMOVAL      on left wrist    SHOULDER ARTHROSCOPY Right 2022    Procedure: SHOULDER ARTHROSCOPY,  DISTAL CLAVICLE RESECTION,SUBACROMIAL DECOMPRESSION;  Surgeon: Caden Bain MD;  Location: [facility];  Service: Orthopedics;  Laterality: Right;    SHOULDER SURGERY Left 2018    LEFT SHOULDER SCOPE    VASECTOMY         Family History   Problem Relation Age of Onset    Dementia Mother     Hyperlipidemia Father     Transient ischemic attack Father     Cancer Other     Diabetes Other        Social History     Socioeconomic History    Marital status: Legally    Tobacco Use    Smoking status: Former     Current packs/day: 0.00     Types: Cigarettes     Quit date: 2010     Years since quittin.5    Smokeless tobacco: Never   Vaping Use    Vaping status: Never Used   Substance and Sexual Activity    Alcohol use: Not Currently     Comment: occ    Drug use: No    Sexual activity: Defer     Prior to Admission medications   "  Medication Sig Start Date End Date Taking? Authorizing Provider   Ascorbic Acid (VITAMIN C PO) Take  by mouth Daily.    Pacheco Mancia MD   buPROPion XL (WELLBUTRIN XL) 300 MG 24 hr tablet Take 1 tablet by mouth Daily. 1/10/24   Pacheco Mancia MD   HYDROcodone-acetaminophen (NORCO) 7.5-325 MG per tablet Take 1 tablet by mouth 4 (Four) Times a Day As Needed for Severe Pain. DNF before 8/23/2024 8/23/24   Eda Pemberton MD   HYDROcodone-acetaminophen (NORCO) 7.5-325 MG per tablet Take 1 tablet by mouth 4 (Four) Times a Day As Needed for Severe Pain. DNF before 9/22/2024 9/22/24   Eda Pemberton MD   HYDROcodone-acetaminophen (NORCO) 7.5-325 MG per tablet Take 1 tablet by mouth 4 (Four) Times a Day As Needed for Severe Pain. 7/23/24   Eda Pemberton MD   Lisdexamfetamine Dimesylate 10 MG chewable tablet CHEW AND SWALLOW 1 TABLET BY MOUTH EVERY DAY 7/18/24   Lyell, Reggie Duane, MD   lisinopril-hydrochlorothiazide (PRINZIDE,ZESTORETIC) 10-12.5 MG per tablet Take 1 tablet by mouth Daily. 200001 1/17/20   Pacheco Mancia MD   multivitamin with minerals (MENS MULTIVITAMIN PO) Take 1 tablet by mouth Daily.    Pacheco Mancia MD   NON FORMULARY 2 tablets Daily. Ashanti jameson  Mushroom supplement    Pacheco Mancia MD   tadalafil (CIALIS) 10 MG tablet Take 1 tablet by mouth Daily. 12/8/23   Pacheco Mancia MD   vitamin D (ERGOCALCIFEROL) 1.25 MG (68655 UT) capsule capsule Take 1 capsule by mouth 1 (One) Time Per Week. 12/9/22   Pacheco Mancia MD     /86   Pulse 62   Temp 97.5 °F (36.4 °C) (Oral)   Resp 18   Ht 180.3 cm (71\")   Wt 108 kg (238 lb 1.6 oz)   SpO2 97%   BMI 33.21 kg/m²         Objective   Physical Exam  General: Well-developed well-appearing, acute distress secondary to pain  Eyes:  sclera nonicteric  HEENT: Mucous membranes moist, no mucosal swelling   Respirations: Respirations nonlabored, equal breath sounds bilaterally, clear lungs  Heart " regular rate and rhythm, no murmurs rubs or gallops,   Abdomen soft, mildly tender palpation right hemiabdomen nondistended, no hepatosplenomegaly, no hernia, no mass, normal bowel sounds, right CVA tenderness  Extremities no clubbing cyanosis or edema,   Neuro cranial nerves grossly intact, no focal limb deficits  Psych oriented, pleasant affect  Skin no rash, normal perfusion  Procedures           ED Course      Results for orders placed or performed during the hospital encounter of 07/21/24   Comprehensive Metabolic Panel    Specimen: Blood   Result Value Ref Range    Glucose 126 (H) 65 - 99 mg/dL    BUN 17 6 - 20 mg/dL    Creatinine 1.02 0.76 - 1.27 mg/dL    Sodium 137 136 - 145 mmol/L    Potassium 4.1 3.5 - 5.2 mmol/L    Chloride 98 98 - 107 mmol/L    CO2 27.1 22.0 - 29.0 mmol/L    Calcium 9.9 8.6 - 10.5 mg/dL    Total Protein 8.2 6.0 - 8.5 g/dL    Albumin 4.7 3.5 - 5.2 g/dL    ALT (SGPT) 31 1 - 41 U/L    AST (SGOT) 37 1 - 40 U/L    Alkaline Phosphatase 102 39 - 117 U/L    Total Bilirubin 0.4 0.0 - 1.2 mg/dL    Globulin 3.5 gm/dL    A/G Ratio 1.3 g/dL    BUN/Creatinine Ratio 16.7 7.0 - 25.0    Anion Gap 11.9 5.0 - 15.0 mmol/L    eGFR 91.2 >60.0 mL/min/1.73   Lipase    Specimen: Blood   Result Value Ref Range    Lipase 29 13 - 60 U/L   Urinalysis With Culture If Indicated - Urine, Clean Catch    Specimen: Urine, Clean Catch   Result Value Ref Range    Color, UA Yellow Yellow, Straw    Appearance, UA Clear Clear    pH, UA 8.5 (H) 5.0 - 8.0    Specific Gravity, UA 1.015 1.005 - 1.030    Glucose, UA Negative Negative    Ketones, UA Negative Negative    Bilirubin, UA Negative Negative    Blood, UA Large (3+) (A) Negative    Protein, UA Trace (A) Negative    Leuk Esterase, UA Trace (A) Negative    Nitrite, UA Negative Negative    Urobilinogen, UA 1.0 E.U./dL 0.2 - 1.0 E.U./dL   CBC Auto Differential    Specimen: Blood   Result Value Ref Range    WBC 10.17 3.40 - 10.80 10*3/mm3    RBC 5.54 4.14 - 5.80 10*6/mm3     Hemoglobin 15.2 13.0 - 17.7 g/dL    Hematocrit 45.6 37.5 - 51.0 %    MCV 82.3 79.0 - 97.0 fL    MCH 27.4 26.6 - 33.0 pg    MCHC 33.3 31.5 - 35.7 g/dL    RDW 12.7 12.3 - 15.4 %    RDW-SD 38.3 37.0 - 54.0 fl    MPV 9.5 6.0 - 12.0 fL    Platelets 207 140 - 450 10*3/mm3    Neutrophil % 82.0 (H) 42.7 - 76.0 %    Lymphocyte % 9.8 (L) 19.6 - 45.3 %    Monocyte % 7.0 5.0 - 12.0 %    Eosinophil % 0.5 0.3 - 6.2 %    Basophil % 0.3 0.0 - 1.5 %    Immature Grans % 0.4 0.0 - 0.5 %    Neutrophils, Absolute 8.34 (H) 1.70 - 7.00 10*3/mm3    Lymphocytes, Absolute 1.00 0.70 - 3.10 10*3/mm3    Monocytes, Absolute 0.71 0.10 - 0.90 10*3/mm3    Eosinophils, Absolute 0.05 0.00 - 0.40 10*3/mm3    Basophils, Absolute 0.03 0.00 - 0.20 10*3/mm3    Immature Grans, Absolute 0.04 0.00 - 0.05 10*3/mm3    nRBC 0.0 0.0 - 0.2 /100 WBC   Urinalysis, Microscopic Only - Urine, Clean Catch    Specimen: Urine, Clean Catch   Result Value Ref Range    RBC, UA Too Numerous to Count (A) None Seen, 0-2 /HPF    WBC, UA 0-2 None Seen, 0-2 /HPF    Bacteria, UA None Seen None Seen /HPF    Squamous Epithelial Cells, UA 0-2 None Seen, 0-2 /HPF    Hyaline Casts, UA None Seen None Seen /LPF    Methodology Automated Microscopy      CT Abdomen Pelvis Without Contrast    Result Date: 7/21/2024  Impression: 1.2 obstructing calculi within the right ureteropelvic junction, larger measuring 5 mm. Mild right hydronephrosis. There is also a 4 mm calculus at the right ureterovesical junction. 2.Additional right nonobstructive nephrolithiasis. 3.Polycystic kidneys and liver. Renal and liver lesions are incompletely characterized on this noncontrast study. 4.Additional findings as detailed above. Electronically Signed: Rory Brown MD  7/21/2024 9:55 AM EDT  Workstation ID: [id number]                                          Medical Decision Making  Differential diagnosis including cholecystitis, obstructive uropathy, pyelonephritis, appendicitis    Patient is a benign  abdominal examination no signs of peritonitis or acute abdomen.  He was found to have right ureteral lithiasis with some obstructive uropathy.  He was ordered Dilaudid for discomfort with marginal improvement.  He has been advised to not take NSAIDs due to his polycystic kidney disease.  He is not septic.  With his ongoing discomfort patient is agreeable plan of hospital observation with urology consultation.    Problems Addressed:  Ureterolithiasis: complicated acute illness or injury    Amount and/or Complexity of Data Reviewed  Labs: ordered. Decision-making details documented in ED Course.     Details: Creatinine normal, urinalysis negative for bacteria, numerous red cells, CBC no leukocytosis  Radiology: ordered and independent interpretation performed.     Details: My independent interpretation of CT abdomen image polycystic kidney and liver, right ureterolithiasis    Risk  Prescription drug management.        Final diagnoses:   Ureterolithiasis       ED Disposition  ED Disposition       ED Disposition   Decision to Admit    Condition   --    Comment   --               No follow-up provider specified.       Medication List      No changes were made to your prescriptions during this visit.            Tad Mcnulty MD  07/21/24 1111

## 2024-07-21 NOTE — ED NOTES
Nursing report ED to floor  Andrea Dumont  49 y.o.  male    HPI:   Chief Complaint   Patient presents with    Abdominal Pain       Admitting doctor:   Tad Mcnulty MD    Admitting diagnosis:   The encounter diagnosis was Ureterolithiasis.    Code status:   Current Code Status       Date Active Code Status Order ID Comments User Context       7/21/2024 1144 CPR (Attempt to Resuscitate) [id number]  Amber Roche APRN ED        Question Answer    Code Status (Patient has no pulse and is not breathing) CPR (Attempt to Resuscitate)    Medical Interventions (Patient has pulse or is breathing) Full Support    Level Of Support Discussed With Patient                    Allergies:   Codeine, Nsaids, and Tramadol    Isolation:  No active isolations     Fall Risk:  Fall Risk Assessment was completed, and patient is at low risk for falls.   Predictive Model Details         5 (Low) Factor Value    Calculated 7/21/2024 19:10 Age 49    Risk of Fall Model Active Peripheral IV Present     Imaging order in this encounter Present     Respiratory Rate 18     Number of Distinct Medication Classes administered 5     Magnesium not on file     Number of administrations of Analgesic Narcotics 4     Marcelo Scale not on file     Chloride 98 mmol/L     Clinically Relevant Sex Not Female     Days after Admission 0.435     Diastolic BP 92     ALT 31 U/L     Number of administrations of Anti-Hypertensives 1     Total Bilirubin 0.4 mg/dL     Tobacco Use Quit     Potassium 4.1 mmol/L     Creatinine 1.02 mg/dL     Calcium 9.9 mg/dL     Albumin 4.7 g/dL         Weight:       07/21/24  0840   Weight: 108 kg (238 lb 1.6 oz)       Intake and Output  No intake or output data in the 24 hours ending 07/21/24 1910    Diet:   Dietary Orders (From admission, onward)       Start     Ordered    07/22/24 0001  NPO Diet NPO Type: Strict NPO  Diet Effective Midnight        Question:  NPO Type  Answer:  Strict NPO    07/21/24 1618    07/21/24 1617   Diet: Regular/House; Fluid Consistency: Thin (IDDSI 0)  Diet Effective Now        References:    Diet Order [facility]   Question Answer Comment   Diets: Regular/House    Fluid Consistency: Thin (IDDSI 0)        07/21/24 1618                     Most recent vitals:   Vitals:    07/21/24 1048 07/21/24 1557 07/21/24 1601 07/21/24 1847   BP: 149/86 167/96  143/92   Pulse: 62 73 69 70   Resp: 18 15  18   Temp:  97.9 °F (36.6 °C)     TempSrc:  Oral     SpO2: 97% 99% 97% 97%   Weight:       Height:           Active LDAs/IV Access:   Lines, Drains & Airways       Active LDAs       Name Placement date Placement time Site Days    Peripheral IV 07/21/24 0924 Anterior;Distal;Right;Upper Arm 07/21/24 0924  Arm  less than 1                    Skin Condition:   Skin Assessments (last day)       None             Labs (abnormal labs have a star):   Labs Reviewed   COMPREHENSIVE METABOLIC PANEL - Abnormal; Notable for the following components:       Result Value    Glucose 126 (*)     All other components within normal limits    Narrative:     GFR Normal >60  Chronic Kidney Disease <60  Kidney Failure <15     URINALYSIS W/ CULTURE IF INDICATED - Abnormal; Notable for the following components:    pH, UA 8.5 (*)     Blood, UA Large (3+) (*)     Protein, UA Trace (*)     Leuk Esterase, UA Trace (*)     All other components within normal limits    Narrative:     In absence of clinical symptoms, the presence of pyuria, bacteria, and/or nitrites on the urinalysis result does not correlate with infection.   CBC WITH AUTO DIFFERENTIAL - Abnormal; Notable for the following components:    Neutrophil % 82.0 (*)     Lymphocyte % 9.8 (*)     Neutrophils, Absolute 8.34 (*)     All other components within normal limits   URINALYSIS, MICROSCOPIC ONLY - Abnormal; Notable for the following components:    RBC, UA Too Numerous to Count (*)     All other components within normal limits   LIPASE - Normal   CBC AND DIFFERENTIAL    Narrative:     The  following orders were created for panel order CBC & Differential.  Procedure                               Abnormality         Status                     ---------                               -----------         ------                     CBC Auto Differential[[id number]]        Abnormal            Final result                 Please view results for these tests on the individual orders.       LOC: Person, Place, Time, and Situation    Telemetry:  Observation Unit    Cardiac Monitoring Ordered: no    EKG:   No orders to display       Medications Given in the ED:   Medications   sodium chloride 0.9 % flush 10 mL (has no administration in time range)   sodium chloride 0.9 % flush 10 mL (10 mL Intravenous Given 7/21/24 1246)   sodium chloride 0.9 % flush 10 mL (has no administration in time range)   sodium chloride 0.9 % infusion 40 mL (has no administration in time range)   nitroglycerin (NITROSTAT) SL tablet 0.4 mg (has no administration in time range)   sennosides-docusate (PERICOLACE) 8.6-50 MG per tablet 2 tablet (has no administration in time range)     And   polyethylene glycol (MIRALAX) packet 17 g (has no administration in time range)     And   bisacodyl (DULCOLAX) EC tablet 5 mg (has no administration in time range)     And   bisacodyl (DULCOLAX) suppository 10 mg (has no administration in time range)   ondansetron ODT (ZOFRAN-ODT) disintegrating tablet 4 mg ( Oral Not Given:  See Alt 7/21/24 1557)     Or   ondansetron (ZOFRAN) injection 4 mg (4 mg Intravenous Given 7/21/24 1557)   aluminum-magnesium hydroxide-simethicone (MAALOX MAX) 400-400-40 MG/5ML suspension 15 mL (has no administration in time range)   HYDROmorphone (DILAUDID) injection 1 mg (1 mg Intravenous Given 7/21/24 1846)   sodium chloride 0.9 % infusion (100 mL/hr Intravenous Currently Infusing 7/21/24 1807)   tamsulosin (FLOMAX) 24 hr capsule 0.4 mg (has no administration in time range)   buPROPion XL (WELLBUTRIN XL) 24 hr tablet 300 mg (has  no administration in time range)   HYDROcodone-acetaminophen (NORCO) 7.5-325 MG per tablet 1 tablet (has no administration in time range)   lisinopril (PRINIVIL,ZESTRIL) tablet 10 mg (10 mg Oral Given 24)     And   hydroCHLOROthiazide tablet 12.5 mg (12.5 mg Oral Given 24)   HYDROmorphone (DILAUDID) injection 1 mg (1 mg Intravenous Given 24)   ondansetron (ZOFRAN) injection 4 mg (4 mg Intravenous Given 24)   HYDROmorphone (DILAUDID) injection 1 mg (1 mg Intravenous Given 24 1131)       Imaging results:  CT Abdomen Pelvis Without Contrast    Result Date: 2024  Impression: 1.2 obstructing calculi within the right ureteropelvic junction, larger measuring 5 mm. Mild right hydronephrosis. There is also a 4 mm calculus at the right ureterovesical junction. 2.Additional right nonobstructive nephrolithiasis. 3.Polycystic kidneys and liver. Renal and liver lesions are incompletely characterized on this noncontrast study. 4.Additional findings as detailed above. Electronically Signed: Rory Brown MD  2024 9:55 AM EDT  Workstation ID: [id number]     Social issues:   Social History     Socioeconomic History    Marital status: Legally    Tobacco Use    Smoking status: Former     Current packs/day: 0.00     Types: Cigarettes     Quit date: 2010     Years since quittin.5    Smokeless tobacco: Never   Vaping Use    Vaping status: Never Used   Substance and Sexual Activity    Alcohol use: Not Currently     Comment: occ    Drug use: No    Sexual activity: Defer       NIH Stroke Scale:  Interval: (not recorded)  1a. Level of Consciousness: (not recorded)  1b. LOC Questions: (not recorded)  1c. LOC Commands: (not recorded)  2. Best Gaze: (not recorded)  3. Visual: (not recorded)  4. Facial Palsy: (not recorded)  5a. Motor Arm, Left: (not recorded)  5b. Motor Arm, Right: (not recorded)  6a. Motor Leg, Left: (not recorded)  6b. Motor Leg, Right: (not recorded)  7.  Limb Ataxia: (not recorded)  8. Sensory: (not recorded)  9. Best Language: (not recorded)  10. Dysarthria: (not recorded)  11. Extinction and Inattention (formerly Neglect): (not recorded)    Total (NIH Stroke Scale): (not recorded)     Additional notable assessment information:     Nursing report ED to floor:      Herlinda Cano RN   07/21/24 19:10 EDT

## 2024-07-22 ENCOUNTER — APPOINTMENT (OUTPATIENT)
Dept: GENERAL RADIOLOGY | Facility: HOSPITAL | Age: 49
End: 2024-07-22
Payer: COMMERCIAL

## 2024-07-22 ENCOUNTER — ANESTHESIA EVENT (OUTPATIENT)
Dept: PERIOP | Facility: HOSPITAL | Age: 49
End: 2024-07-22
Payer: COMMERCIAL

## 2024-07-22 ENCOUNTER — READMISSION MANAGEMENT (OUTPATIENT)
Dept: CALL CENTER | Facility: HOSPITAL | Age: 49
End: 2024-07-22
Payer: COMMERCIAL

## 2024-07-22 ENCOUNTER — ANESTHESIA (OUTPATIENT)
Dept: PERIOP | Facility: HOSPITAL | Age: 49
End: 2024-07-22
Payer: COMMERCIAL

## 2024-07-22 ENCOUNTER — TELEPHONE (OUTPATIENT)
Dept: PAIN MEDICINE | Facility: CLINIC | Age: 49
End: 2024-07-22
Payer: COMMERCIAL

## 2024-07-22 VITALS
BODY MASS INDEX: 32.9 KG/M2 | TEMPERATURE: 97.6 F | DIASTOLIC BLOOD PRESSURE: 62 MMHG | RESPIRATION RATE: 13 BRPM | SYSTOLIC BLOOD PRESSURE: 98 MMHG | WEIGHT: 235.01 LBS | HEART RATE: 70 BPM | HEIGHT: 71 IN | OXYGEN SATURATION: 97 %

## 2024-07-22 LAB
ANION GAP SERPL CALCULATED.3IONS-SCNC: 11 MMOL/L (ref 5–15)
BASOPHILS # BLD AUTO: 0.02 10*3/MM3 (ref 0–0.2)
BASOPHILS NFR BLD AUTO: 0.2 % (ref 0–1.5)
BUN SERPL-MCNC: 13 MG/DL (ref 6–20)
BUN/CREAT SERPL: 14.3 (ref 7–25)
CALCIUM SPEC-SCNC: 8.8 MG/DL (ref 8.6–10.5)
CHLORIDE SERPL-SCNC: 100 MMOL/L (ref 98–107)
CO2 SERPL-SCNC: 26 MMOL/L (ref 22–29)
CREAT SERPL-MCNC: 0.91 MG/DL (ref 0.76–1.27)
DEPRECATED RDW RBC AUTO: 38.8 FL (ref 37–54)
EGFRCR SERPLBLD CKD-EPI 2021: 104.6 ML/MIN/1.73
EOSINOPHIL # BLD AUTO: 0.11 10*3/MM3 (ref 0–0.4)
EOSINOPHIL NFR BLD AUTO: 0.9 % (ref 0.3–6.2)
ERYTHROCYTE [DISTWIDTH] IN BLOOD BY AUTOMATED COUNT: 12.9 % (ref 12.3–15.4)
GLUCOSE SERPL-MCNC: 121 MG/DL (ref 65–99)
HCT VFR BLD AUTO: 41.5 % (ref 37.5–51)
HGB BLD-MCNC: 13.8 G/DL (ref 13–17.7)
IMM GRANULOCYTES # BLD AUTO: 0.04 10*3/MM3 (ref 0–0.05)
IMM GRANULOCYTES NFR BLD AUTO: 0.3 % (ref 0–0.5)
LYMPHOCYTES # BLD AUTO: 2.06 10*3/MM3 (ref 0.7–3.1)
LYMPHOCYTES NFR BLD AUTO: 16.8 % (ref 19.6–45.3)
MCH RBC QN AUTO: 27.5 PG (ref 26.6–33)
MCHC RBC AUTO-ENTMCNC: 33.3 G/DL (ref 31.5–35.7)
MCV RBC AUTO: 82.8 FL (ref 79–97)
MONOCYTES # BLD AUTO: 1.2 10*3/MM3 (ref 0.1–0.9)
MONOCYTES NFR BLD AUTO: 9.8 % (ref 5–12)
NEUTROPHILS NFR BLD AUTO: 72 % (ref 42.7–76)
NEUTROPHILS NFR BLD AUTO: 8.82 10*3/MM3 (ref 1.7–7)
NRBC BLD AUTO-RTO: 0 /100 WBC (ref 0–0.2)
PLATELET # BLD AUTO: 203 10*3/MM3 (ref 140–450)
PMV BLD AUTO: 10.6 FL (ref 6–12)
POTASSIUM SERPL-SCNC: 3.5 MMOL/L (ref 3.5–5.2)
RBC # BLD AUTO: 5.01 10*6/MM3 (ref 4.14–5.8)
SODIUM SERPL-SCNC: 137 MMOL/L (ref 136–145)
WBC NRBC COR # BLD AUTO: 12.25 10*3/MM3 (ref 3.4–10.8)

## 2024-07-22 PROCEDURE — 25810000003 LACTATED RINGERS PER 1000 ML: Performed by: NURSE ANESTHETIST, CERTIFIED REGISTERED

## 2024-07-22 PROCEDURE — 25010000002 MIDAZOLAM PER 1 MG: Performed by: NURSE ANESTHETIST, CERTIFIED REGISTERED

## 2024-07-22 PROCEDURE — 80048 BASIC METABOLIC PNL TOTAL CA: CPT | Performed by: NURSE PRACTITIONER

## 2024-07-22 PROCEDURE — 25810000003 SODIUM CHLORIDE 0.9 % SOLUTION: Performed by: NURSE PRACTITIONER

## 2024-07-22 PROCEDURE — C1769 GUIDE WIRE: HCPCS | Performed by: UROLOGY

## 2024-07-22 PROCEDURE — 25010000002 ONDANSETRON PER 1 MG: Performed by: NURSE ANESTHETIST, CERTIFIED REGISTERED

## 2024-07-22 PROCEDURE — G0378 HOSPITAL OBSERVATION PER HR: HCPCS

## 2024-07-22 PROCEDURE — 76000 FLUOROSCOPY <1 HR PHYS/QHP: CPT

## 2024-07-22 PROCEDURE — C1758 CATHETER, URETERAL: HCPCS | Performed by: UROLOGY

## 2024-07-22 PROCEDURE — 25010000002 PROPOFOL 200 MG/20ML EMULSION: Performed by: NURSE ANESTHETIST, CERTIFIED REGISTERED

## 2024-07-22 PROCEDURE — 25010000002 FENTANYL CITRATE (PF) 100 MCG/2ML SOLUTION: Performed by: NURSE ANESTHETIST, CERTIFIED REGISTERED

## 2024-07-22 PROCEDURE — 96376 TX/PRO/DX INJ SAME DRUG ADON: CPT

## 2024-07-22 PROCEDURE — 25010000002 FENTANYL CITRATE (PF) 50 MCG/ML SOLUTION: Performed by: ANESTHESIOLOGY

## 2024-07-22 PROCEDURE — 25010000002 CEFTRIAXONE PER 250 MG: Performed by: NURSE PRACTITIONER

## 2024-07-22 PROCEDURE — 25010000002 KETOROLAC TROMETHAMINE PER 15 MG: Performed by: NURSE ANESTHETIST, CERTIFIED REGISTERED

## 2024-07-22 PROCEDURE — C2617 STENT, NON-COR, TEM W/O DEL: HCPCS | Performed by: UROLOGY

## 2024-07-22 PROCEDURE — 25010000002 HYDROMORPHONE 1 MG/ML SOLUTION: Performed by: NURSE PRACTITIONER

## 2024-07-22 PROCEDURE — 25010000002 DEXAMETHASONE PER 1 MG: Performed by: NURSE ANESTHETIST, CERTIFIED REGISTERED

## 2024-07-22 PROCEDURE — 85025 COMPLETE CBC W/AUTO DIFF WBC: CPT | Performed by: NURSE PRACTITIONER

## 2024-07-22 PROCEDURE — 0 IOHEXOL 300 MG/ML SOLUTION: Performed by: UROLOGY

## 2024-07-22 DEVICE — VARIABLE LENGTH URETERAL STENT
Type: IMPLANTABLE DEVICE | Site: URETER | Status: NON-FUNCTIONAL
Brand: CONTOUR VL™
Removed: 2024-08-05

## 2024-07-22 RX ORDER — HYDROCODONE BITARTRATE AND ACETAMINOPHEN 10; 325 MG/1; MG/1
1 TABLET ORAL EVERY 6 HOURS PRN
Qty: 8 TABLET | Refills: 0 | Status: ON HOLD | OUTPATIENT
Start: 2024-07-22 | End: 2024-08-05

## 2024-07-22 RX ORDER — FENTANYL CITRATE 50 UG/ML
50 INJECTION, SOLUTION INTRAMUSCULAR; INTRAVENOUS
Status: DISCONTINUED | OUTPATIENT
Start: 2024-07-22 | End: 2024-07-22 | Stop reason: HOSPADM

## 2024-07-22 RX ORDER — SULFAMETHOXAZOLE AND TRIMETHOPRIM 800; 160 MG/1; MG/1
1 TABLET ORAL EVERY 12 HOURS SCHEDULED
Status: DISCONTINUED | OUTPATIENT
Start: 2024-07-22 | End: 2024-07-22 | Stop reason: HOSPADM

## 2024-07-22 RX ORDER — MIDAZOLAM HYDROCHLORIDE 1 MG/ML
2 INJECTION INTRAMUSCULAR; INTRAVENOUS
Status: DISCONTINUED | OUTPATIENT
Start: 2024-07-22 | End: 2024-07-22 | Stop reason: HOSPADM

## 2024-07-22 RX ORDER — DEXAMETHASONE SODIUM PHOSPHATE 4 MG/ML
INJECTION, SOLUTION INTRA-ARTICULAR; INTRALESIONAL; INTRAMUSCULAR; INTRAVENOUS; SOFT TISSUE AS NEEDED
Status: DISCONTINUED | OUTPATIENT
Start: 2024-07-22 | End: 2024-07-22 | Stop reason: SURG

## 2024-07-22 RX ORDER — NITROFURANTOIN 25; 75 MG/1; MG/1
100 CAPSULE ORAL 2 TIMES DAILY
Qty: 28 CAPSULE | Refills: 0 | Status: SHIPPED | OUTPATIENT
Start: 2024-07-22 | End: 2024-08-05 | Stop reason: HOSPADM

## 2024-07-22 RX ORDER — SODIUM CHLORIDE, SODIUM LACTATE, POTASSIUM CHLORIDE, CALCIUM CHLORIDE 600; 310; 30; 20 MG/100ML; MG/100ML; MG/100ML; MG/100ML
INJECTION, SOLUTION INTRAVENOUS CONTINUOUS PRN
Status: DISCONTINUED | OUTPATIENT
Start: 2024-07-22 | End: 2024-07-22 | Stop reason: SURG

## 2024-07-22 RX ORDER — PHENYLEPHRINE HCL IN 0.9% NACL 1 MG/10 ML
SYRINGE (ML) INTRAVENOUS AS NEEDED
Status: DISCONTINUED | OUTPATIENT
Start: 2024-07-22 | End: 2024-07-22 | Stop reason: SURG

## 2024-07-22 RX ORDER — FENTANYL CITRATE 50 UG/ML
INJECTION, SOLUTION INTRAMUSCULAR; INTRAVENOUS AS NEEDED
Status: DISCONTINUED | OUTPATIENT
Start: 2024-07-22 | End: 2024-07-22 | Stop reason: SURG

## 2024-07-22 RX ORDER — KETOROLAC TROMETHAMINE 30 MG/ML
INJECTION, SOLUTION INTRAMUSCULAR; INTRAVENOUS AS NEEDED
Status: DISCONTINUED | OUTPATIENT
Start: 2024-07-22 | End: 2024-07-22 | Stop reason: SURG

## 2024-07-22 RX ORDER — ONDANSETRON 4 MG/1
4 TABLET, FILM COATED ORAL EVERY 8 HOURS PRN
Qty: 30 TABLET | Refills: 0 | Status: SHIPPED | OUTPATIENT
Start: 2024-07-22

## 2024-07-22 RX ORDER — LIDOCAINE HYDROCHLORIDE 10 MG/ML
INJECTION, SOLUTION EPIDURAL; INFILTRATION; INTRACAUDAL; PERINEURAL AS NEEDED
Status: DISCONTINUED | OUTPATIENT
Start: 2024-07-22 | End: 2024-07-22 | Stop reason: SURG

## 2024-07-22 RX ORDER — MIDAZOLAM HYDROCHLORIDE 1 MG/ML
INJECTION INTRAMUSCULAR; INTRAVENOUS AS NEEDED
Status: DISCONTINUED | OUTPATIENT
Start: 2024-07-22 | End: 2024-07-22 | Stop reason: SURG

## 2024-07-22 RX ORDER — ONDANSETRON 2 MG/ML
4 INJECTION INTRAMUSCULAR; INTRAVENOUS ONCE AS NEEDED
Status: DISCONTINUED | OUTPATIENT
Start: 2024-07-22 | End: 2024-07-22 | Stop reason: HOSPADM

## 2024-07-22 RX ORDER — PROPOFOL 10 MG/ML
INJECTION, EMULSION INTRAVENOUS AS NEEDED
Status: DISCONTINUED | OUTPATIENT
Start: 2024-07-22 | End: 2024-07-22 | Stop reason: SURG

## 2024-07-22 RX ORDER — HYDROCODONE BITARTRATE AND ACETAMINOPHEN 7.5; 325 MG/1; MG/1
1 TABLET ORAL 4 TIMES DAILY PRN
Qty: 12 TABLET | Refills: 0 | Status: SHIPPED | OUTPATIENT
Start: 2024-07-22 | End: 2024-07-22 | Stop reason: HOSPADM

## 2024-07-22 RX ORDER — ONDANSETRON 2 MG/ML
INJECTION INTRAMUSCULAR; INTRAVENOUS AS NEEDED
Status: DISCONTINUED | OUTPATIENT
Start: 2024-07-22 | End: 2024-07-22 | Stop reason: SURG

## 2024-07-22 RX ADMIN — KETOROLAC TROMETHAMINE 30 MG: 30 INJECTION, SOLUTION INTRAMUSCULAR at 11:12

## 2024-07-22 RX ADMIN — Medication 100 MCG: at 10:59

## 2024-07-22 RX ADMIN — LISINOPRIL 10 MG: 5 TABLET ORAL at 09:00

## 2024-07-22 RX ADMIN — DEXAMETHASONE SODIUM PHOSPHATE 4 MG: 4 INJECTION, SOLUTION INTRAMUSCULAR; INTRAVENOUS at 11:04

## 2024-07-22 RX ADMIN — Medication 100 MCG: at 11:02

## 2024-07-22 RX ADMIN — HYDROMORPHONE HYDROCHLORIDE 1 MG: 1 INJECTION, SOLUTION INTRAMUSCULAR; INTRAVENOUS; SUBCUTANEOUS at 07:33

## 2024-07-22 RX ADMIN — FENTANYL CITRATE 50 MCG: 50 INJECTION, SOLUTION INTRAMUSCULAR; INTRAVENOUS at 10:48

## 2024-07-22 RX ADMIN — Medication 10 ML: at 09:04

## 2024-07-22 RX ADMIN — BUPROPION HYDROCHLORIDE 300 MG: 150 TABLET, EXTENDED RELEASE ORAL at 09:00

## 2024-07-22 RX ADMIN — LIDOCAINE HYDROCHLORIDE 50 MG: 10 INJECTION, SOLUTION EPIDURAL; INFILTRATION; INTRACAUDAL; PERINEURAL at 10:48

## 2024-07-22 RX ADMIN — ONDANSETRON 4 MG: 2 INJECTION INTRAMUSCULAR; INTRAVENOUS at 11:04

## 2024-07-22 RX ADMIN — SODIUM CHLORIDE, SODIUM LACTATE, POTASSIUM CHLORIDE, AND CALCIUM CHLORIDE: .6; .31; .03; .02 INJECTION, SOLUTION INTRAVENOUS at 10:48

## 2024-07-22 RX ADMIN — PROPOFOL 50 MG: 10 INJECTION, EMULSION INTRAVENOUS at 11:01

## 2024-07-22 RX ADMIN — FENTANYL CITRATE 50 MCG: 50 INJECTION, SOLUTION INTRAMUSCULAR; INTRAVENOUS at 11:54

## 2024-07-22 RX ADMIN — Medication 100 MCG: at 10:55

## 2024-07-22 RX ADMIN — PROPOFOL 100 MG: 10 INJECTION, EMULSION INTRAVENOUS at 10:58

## 2024-07-22 RX ADMIN — FENTANYL CITRATE 50 MCG: 50 INJECTION, SOLUTION INTRAMUSCULAR; INTRAVENOUS at 10:57

## 2024-07-22 RX ADMIN — SODIUM CHLORIDE 100 ML/HR: 9 INJECTION, SOLUTION INTRAVENOUS at 09:04

## 2024-07-22 RX ADMIN — HYDROMORPHONE HYDROCHLORIDE 1 MG: 1 INJECTION, SOLUTION INTRAMUSCULAR; INTRAVENOUS; SUBCUTANEOUS at 00:55

## 2024-07-22 RX ADMIN — MIDAZOLAM 2 MG: 1 INJECTION INTRAMUSCULAR; INTRAVENOUS at 10:48

## 2024-07-22 RX ADMIN — PROPOFOL 200 MG: 10 INJECTION, EMULSION INTRAVENOUS at 10:48

## 2024-07-22 RX ADMIN — CEFTRIAXONE 2000 MG: 2 INJECTION, POWDER, FOR SOLUTION INTRAMUSCULAR; INTRAVENOUS at 09:00

## 2024-07-22 RX ADMIN — HYDROCHLOROTHIAZIDE 12.5 MG: 12.5 TABLET ORAL at 09:00

## 2024-07-22 NOTE — PLAN OF CARE
Problem: Adult Inpatient Plan of Care  Goal: Plan of Care Review  Outcome: Ongoing, Progressing  Flowsheets (Taken 7/22/2024 0631)  Progress: no change  Plan of Care Reviewed With: patient  Outcome Evaluation: Patient to have Urology consult this AM, currently NPO. Bed in lowest position, call light within reach.     Problem: Adult Inpatient Plan of Care  Goal: Absence of Hospital-Acquired Illness or Injury  Intervention: Identify and Manage Fall Risk  Description: Perform standard risk assessment on admission using a validated tool or comprehensive approach appropriate to the patient; reassess fall risk frequently, with change in status or transfer to another level of care.  Communicate fall injury risk to interprofessional healthcare team.  Determine need for increased observation, equipment and environmental modification, such as low bed, signage and supportive, nonskid footwear.  Adjust safety measures to individual developmental age, stage and identified risk factors.  Reinforce the importance of safety and physical activity with patient and family.  Perform regular intentional rounding to assess need for position change, pain assessment and personal needs, including assistance with toileting.  Recent Flowsheet Documentation  Taken 7/22/2024 0400 by Sudha Jang, RN  Safety Promotion/Fall Prevention: safety round/check completed  Taken 7/22/2024 0200 by Sudha Jang, RN  Safety Promotion/Fall Prevention: safety round/check completed  Taken 7/21/2024 2212 by Sudha Jang, RN  Safety Promotion/Fall Prevention: safety round/check completed  Intervention: Prevent Skin Injury  Description: Perform a screening for skin injury risk, such as pressure or moisture associated skin damage on admission and at regular intervals throughout hospital stay.  Keep all areas of skin (especially folds) clean and dry.  Maintain adequate skin hydration.  Relieve and redistribute pressure and protect bony prominences;  implement measures based on patient-specific risk factors.  Match turning and repositioning schedule to clinical condition.  Encourage weight shift frequently; assist with reposition if unable to complete independently.  Float heels off bed; avoid pressure on the Achilles tendon.  Keep skin free from extended contact with medical devices.  Encourage functional activity and mobility, as early as tolerated.  Use aids (e.g., slide boards, mechanical lift) during transfer.  Recent Flowsheet Documentation  Taken 7/21/2024 2212 by Sudha Jang RN  Body Position: position changed independently  Intervention: Prevent and Manage VTE (Venous Thromboembolism) Risk  Description: Assess for VTE (venous thromboembolism) risk.  Encourage and assist with early ambulation.  Initiate and maintain compression or other therapy, as indicated, based on identified risk in accordance with organizational protocol and provider order.  Encourage both active and passive leg exercises while in bed, if unable to ambulate.  Recent Flowsheet Documentation  Taken 7/21/2024 2212 by Sudha Jang RN  Activity Management: ambulated to bathroom  VTE Prevention/Management:   bilateral   sequential compression devices off  Range of Motion: active ROM (range of motion) encouraged  Intervention: Prevent Infection  Description: Maintain skin and mucous membrane integrity; promote hand, oral and pulmonary hygiene.  Optimize fluid balance, nutrition, sleep and glycemic control to maximize infection resistance.  Identify potential sources of infection early to prevent or mitigate progression of infection (e.g., wound, lines, devices).  Evaluate ongoing need for invasive devices; remove promptly when no longer indicated.  Recent Flowsheet Documentation  Taken 7/22/2024 0400 by Sudha Jang, RN  Infection Prevention:   hand hygiene promoted   rest/sleep promoted   single patient room provided  Taken 7/22/2024 0200 by Sudha Jang  RN  Infection Prevention:   hand hygiene promoted   rest/sleep promoted   single patient room provided  Taken 7/21/2024 2212 by Sudha Jang RN  Infection Prevention:   hand hygiene promoted   rest/sleep promoted   single patient room provided  Goal: Optimal Comfort and Wellbeing  Intervention: Provide Person-Centered Care  Description: Use a family-focused approach to care.  Develop trust and rapport by proactively providing information, encouraging questions, addressing concerns and offering reassurance.  Acknowledge emotional response to hospitalization.  Recognize and utilize personal coping strategies.  Honor spiritual and cultural preferences.  Recent Flowsheet Documentation  Taken 7/21/2024 2212 by Sudha Jang RN  Trust Relationship/Rapport:   care explained   choices provided   emotional support provided   empathic listening provided   questions answered   questions encouraged   reassurance provided   thoughts/feelings acknowledged  Goal: Readiness for Transition of Care  Intervention: Mutually Develop Transition Plan  Description: Identify available resources for support (e.g., family, friends, community).  Identify and address barriers to ongoing treatment and home management (e.g., environmental, financial).  Provide opportunities to practice self-management skills.  Assess and monitor emotional readiness for transition.  Establish or reconnect linkage with outpatient providers or community-based services.  Recent Flowsheet Documentation  Taken 7/21/2024 2212 by Sudha Jang RN  Transportation Anticipated: family or friend will provide  Patient/Family Anticipated Services at Transition: none  Patient/Family Anticipates Transition to: home  Taken 7/21/2024 2208 by Sudha Jang RN  Equipment Currently Used at Home: none     Problem: Asthma Comorbidity  Goal: Maintenance of Asthma Control  Intervention: Maintain Asthma Symptom Control  Description: Evaluate adherence to  self-management (asthma action plan), such as medication, symptom-control, trigger-avoidance and self-monitoring.  Advocate for continuation of home regimen, including medication, method of delivery, schedule and symptom monitoring; acknowledge preferred modality and routine.  Minimize exposure to potential triggers, such as perfume, cleaning chemicals and all types of smoke.  Assess for proper use of inhaled medication and delivery technique; assist or reinstruct if needed.  Evaluate effectiveness of coping skills; encourage expression of feelings, expectations and concerns related to disease management and quality of life; reinforce education to enhance management plan and wellbeing.  Recent Flowsheet Documentation  Taken 7/21/2024 2212 by Sudha Jang RN  Medication Review/Management: medications reviewed     Problem: Hypertension Comorbidity  Goal: Blood Pressure in Desired Range  Intervention: Maintain Blood Pressure Management  Description: Evaluate adherence to home antihypertensive regimen (e.g., exercise and activity, diet modification, medication).  Provide scheduled antihypertensive medication; consider administration time and effects (e.g., avoid giving diuretic prior to bedtime).  Monitor response to antihypertensive medication therapy (e.g., blood pressure, electrolyte levels, medication effects).  Minimize risk of orthostatic hypotension; encourage caution with position changes, particularly if elderly.  Recent Flowsheet Documentation  Taken 7/21/2024 2212 by Sudha Jang RN  Medication Review/Management: medications reviewed   Goal Outcome Evaluation:  Plan of Care Reviewed With: patient        Progress: no change  Outcome Evaluation: Patient to have Urology consult this AM, currently NPO. Bed in lowest position, call light within reach.

## 2024-07-22 NOTE — PLAN OF CARE
Goal Outcome Evaluation:  Plan of Care Reviewed With: patient        Progress: improving  Outcome Evaluation: pt had right ureteral cysto with steant placement will f/u with urology outpt.

## 2024-07-22 NOTE — ANESTHESIA POSTPROCEDURE EVALUATION
Patient: Andrea Dumont    Procedure Summary       Date: 07/22/24 Room / Location: [facility] OR  / [facility] MAIN OR    Anesthesia Start: 1040 Anesthesia Stop: 1131    Procedure: CYSTOSCOPY URETEROSCOPY RETROGRADE PYELOGRAM STENT INSERTION (Right: Ureter) Diagnosis:     Surgeons: Curt Valerio MD Provider: Raulito Watt MD    Anesthesia Type: general ASA Status: 2            Anesthesia Type: general    Vitals  Vitals Value Taken Time   BP 97/56 07/22/24 1145   Temp 97.6 °F (36.4 °C) 07/22/24 1123   Pulse 79 07/22/24 1149   Resp 11 07/22/24 1138   SpO2 100 % 07/22/24 1149   Vitals shown include unfiled device data.        Post Anesthesia Care and Evaluation    Patient location during evaluation: PACU  Patient participation: complete - patient participated  Level of consciousness: awake  Pain scale: See nurse's notes for pain score.  Pain management: adequate    Airway patency: patent  Anesthetic complications: No anesthetic complications  PONV Status: none  Cardiovascular status: acceptable  Respiratory status: acceptable and spontaneous ventilation  Hydration status: acceptable    Comments: Patient seen and examined postoperatively; vital signs stable; SpO2 greater than or equal to 90%; cardiopulmonary status stable; nausea/vomiting adequately controlled; pain adequately controlled; no apparent anesthesia complications; patient discharged from anesthesia care when discharge criteria were met

## 2024-07-22 NOTE — ANESTHESIA PREPROCEDURE EVALUATION
Anesthesia Evaluation     NPO Solid Status: > 8 hours  NPO Liquid Status: > 8 hours           Airway   Mallampati: II  TM distance: >3 FB  Neck ROM: full  No difficulty expected  Dental - normal exam     Pulmonary - normal exam   Cardiovascular - normal exam    (+) hypertension well controlled      Neuro/Psych  (+) numbness, psychiatric history Depression  GI/Hepatic/Renal/Endo    (+) obesity, renal disease- stones    Musculoskeletal     Abdominal  - normal exam    Bowel sounds: normal.   Substance History      OB/GYN          Other   arthritis,                 Anesthesia Plan    ASA 2     general     intravenous induction     Anesthetic plan, risks, benefits, and alternatives have been provided, discussed and informed consent has been obtained with: patient.  Pre-procedure education provided  Plan discussed with CRNA.    CODE STATUS:    Level Of Support Discussed With: Patient  Code Status (Patient has no pulse and is not breathing): CPR (Attempt to Resuscitate)  Medical Interventions (Patient has pulse or is breathing): Full Support

## 2024-07-22 NOTE — OP NOTE
Preoperative Diagnosis: Right Distal, Right Proximal, and Right Renal Stones  Postoperative Diagnosis: same but very small ureter and impacted proximal stones  Procedures:  Diagnostic Right Ureteroscopy  Cystoscopy with Right Retrograde Pyelogram and Right Stent Placement  Surgeon: Iman  Anesthesia: General  Complications: none  Findings:  Very small ureter and impacted stones (not surprising given small ureter)  Could only get 4.8 FR VL stent   Plan:  He will need right staged ureteroscopy but will need to wait 2 weeks (my  will call him)    History:   See our note    Description of Procedure:  Patient was brought back to the operating room and anesthetized without problems.  A timeout was done and IV antibiotics were given.  He was placed in the lithotomy position, properly padded then prepped/draped.  Cystoscopy revealed a normal urethral, small prostate and a normal bladder.  I placed a sensor wire into the ureter and was able to negotiate the wire past an impacted distal then an impacted proximal ureteral stone.  I tried to dilate the distal ureter with a dual lumen catheter but the ureter was too tight.  Right ureteroscopy revealed a normal intramural ureter but his ureter beyond this was very tight with a very small lumen.  I knew at this point he would need stenting and a staged ureteroscopy.  I had to stabilize the wire with the cystoscope to even get a pollack into the renal pelvis.  Retrograde pyelogram showed moderate hydronephrosis but no extravasation.  The only stent I could get in was a 4.8 Upper sorbian VL stent and there were good curls in the renal pelvis and the bladder.  The bladder was drained and the procedure was terminated.    There were no immediate complications.    Plan:  He can go home today from  [facility].  Would ask him if he can take any type of oral pain meds b/c he will need them.  Given him 2 weeks of Macrobid  My  will call him to schedule his  ureteroscopy    My partner will see in am if still here in the am

## 2024-07-22 NOTE — OUTREACH NOTE
Prep Survey      Flowsheet Row Responses   [facility] [place] [facility] patient discharged from? [place]   Is LACE score < 7 ? Yes   Eligibility [facility]   Date of Admission 07/21/24   Date of Discharge 07/22/24   Discharge Disposition Home or Self Care   Discharge diagnosis Ureterolithiasis, CYSTOSCOPY URETEROSCOPY RETROGRADE PYELOGRAM STENT INSERTION   Does the patient have one of the following disease processes/diagnoses(primary or secondary)? Other   Does the patient have Home health ordered? No   Is there a DME ordered? No   Prep survey completed? Yes            Debra Hawthorne Registered Nurse

## 2024-07-22 NOTE — TELEPHONE ENCOUNTER
Caller: Andrea Dumont    Relationship to patient: Self    Best call back number: [phone]    Patient is needing: PT IS CALLING BECAUSE HE HAD SX YESTERDAY AND THEY ARE GOING TO PRESCRIBE HIM HYDROCODONE 10'S TODAY AND HE IS WANTING TO MAKE SURE IT IS OKAY TO  HIS NORMAL MEDICATION ON 7/24/24.. PLEASE ADVISE..

## 2024-07-22 NOTE — DISCHARGE SUMMARY
[facility]    Jalilaparna BrittaniINDERJIT Marks    CHIEF COMPLAINT:     Abdominal pain     HISTORY OF PRESENT ILLNESS:    HPI    49-year-old male describes severe pain in the right flank that started overnight. States he had had a pain in his right abdomen couple of days ago that resolved after a bowel movement. He reports no fevers or chills. He reports no associated vomiting or diarrhea or dysuria. Today he reports no relieving or exacerbating factors.     Past Medical History:   Diagnosis Date    Arthritis     COVID-19     2021    Depression     Guillain Barré syndrome     Hand numbness     left-- cramping    High blood pressure     Leg pain     Multiple sclerosis     Optic neuritis     Pain in both feet     Pneumonia      with covid 2021    Polycystic kidney disease     Right shoulder pain 2022     Past Surgical History:   Procedure Laterality Date    CYST REMOVAL      on left wrist    SHOULDER ARTHROSCOPY Right 2022    Procedure: SHOULDER ARTHROSCOPY,  DISTAL CLAVICLE RESECTION,SUBACROMIAL DECOMPRESSION;  Surgeon: Caden Bain MD;  Location: [facility];  Service: Orthopedics;  Laterality: Right;    SHOULDER SURGERY Left 2018    LEFT SHOULDER SCOPE    VASECTOMY       Family History   Problem Relation Age of Onset    Dementia Mother     Hyperlipidemia Father     Transient ischemic attack Father     Cancer Other     Diabetes Other      Social History     Tobacco Use    Smoking status: Former     Current packs/day: 0.00     Types: Cigarettes     Quit date: 2010     Years since quittin.5    Smokeless tobacco: Never   Vaping Use    Vaping status: Every Day    Substances: Nicotine    Devices: Disposable   Substance Use Topics    Alcohol use: Not Currently     Comment: occ    Drug use: No     Medications Prior to Admission   Medication Sig Dispense Refill Last Dose    buPROPion XL (WELLBUTRIN XL) 300 MG 24 hr tablet Take 1 tablet by mouth Daily.   Past Week     [START ON 8/23/2024] HYDROcodone-acetaminophen (NORCO) 7.5-325 MG per tablet Take 1 tablet by mouth 4 (Four) Times a Day As Needed for Severe Pain. DNF before 8/23/2024 120 tablet 0     Lisdexamfetamine Dimesylate 10 MG chewable tablet CHEW AND SWALLOW 1 TABLET BY MOUTH EVERY DAY 30 tablet 0 7/20/2024    lisinopril-hydrochlorothiazide (PRINZIDE,ZESTORETIC) 10-12.5 MG per tablet Take 1 tablet by mouth Daily. 200001 7/20/2024    multivitamin with minerals (MENS MULTIVITAMIN PO) Take 1 tablet by mouth Daily.   7/20/2024    NON FORMULARY 2 tablets Daily. Lions gisell  Mushroom supplement   7/20/2024    tadalafil (CIALIS) 10 MG tablet Take 1 tablet by mouth Daily As Needed.        Allergies:  Codeine, Nsaids, and Tramadol    Immunization History   Administered Date(s) Administered    H1N1 Inj 12/26/2009    Hepatitis B Adult/Adolescent IM 12/14/2013, 01/29/2014, 07/08/2014    Influenza Injectable Mdck Pf Quad 10/29/2019    Influenza Quad Vaccine (Inpatient) 12/06/2013, 12/01/2014    Influenza, Unspecified 02/03/2016, 11/17/2020    PPD Test 01/26/2015    Tdap 01/01/2013, 12/22/2013           REVIEW OF SYSTEMS:    ROS    Vital Signs  Temp:  [97.6 °F (36.4 °C)-98.1 °F (36.7 °C)] 97.6 °F (36.4 °C)  Heart Rate:  [] 70  Resp:  [10-18] 13  BP: ()/(34-96) 98/62          Physical Exam:  Physical Exam    Emotional Behavior:    WNL   Debilities:   none  Results Review:    I reviewed the patient's new clinical results.  Lab Results (most recent)       Procedure Component Value Units Date/Time    Basic Metabolic Panel [[id number]]  (Abnormal) Collected: 07/22/24 0339    Specimen: Blood from Hand, Right Updated: 07/22/24 0510     Glucose 121 mg/dL      BUN 13 mg/dL      Creatinine 0.91 mg/dL      Sodium 137 mmol/L      Potassium 3.5 mmol/L      Chloride 100 mmol/L      CO2 26.0 mmol/L      Calcium 8.8 mg/dL      BUN/Creatinine Ratio 14.3     Anion Gap 11.0 mmol/L      eGFR 104.6 mL/min/1.73     Narrative:      GFR Normal  >60  Chronic Kidney Disease <60  Kidney Failure <15      CBC & Differential [[id number]]  (Abnormal) Collected: 07/22/24 0339    Specimen: Blood from Hand, Right Updated: 07/22/24 0450    Narrative:      The following orders were created for panel order CBC & Differential.  Procedure                               Abnormality         Status                     ---------                               -----------         ------                     CBC Auto Differential[[id number]]        Abnormal            Final result                 Please view results for these tests on the individual orders.    CBC Auto Differential [[id number]]  (Abnormal) Collected: 07/22/24 0339    Specimen: Blood from Hand, Right Updated: 07/22/24 0450     WBC 12.25 10*3/mm3      RBC 5.01 10*6/mm3      Hemoglobin 13.8 g/dL      Hematocrit 41.5 %      MCV 82.8 fL      MCH 27.5 pg      MCHC 33.3 g/dL      RDW 12.9 %      RDW-SD 38.8 fl      MPV 10.6 fL      Platelets 203 10*3/mm3      Neutrophil % 72.0 %      Lymphocyte % 16.8 %      Monocyte % 9.8 %      Eosinophil % 0.9 %      Basophil % 0.2 %      Immature Grans % 0.3 %      Neutrophils, Absolute 8.82 10*3/mm3      Lymphocytes, Absolute 2.06 10*3/mm3      Monocytes, Absolute 1.20 10*3/mm3      Eosinophils, Absolute 0.11 10*3/mm3      Basophils, Absolute 0.02 10*3/mm3      Immature Grans, Absolute 0.04 10*3/mm3      nRBC 0.0 /100 WBC     Urinalysis, Microscopic Only - Urine, Clean Catch [[id number]]  (Abnormal) Collected: 07/21/24 1000    Specimen: Urine, Clean Catch Updated: 07/21/24 1011     RBC, UA Too Numerous to Count /HPF      WBC, UA 0-2 /HPF      Comment: Urine culture not indicated.        Bacteria, UA None Seen /HPF      Squamous Epithelial Cells, UA 0-2 /HPF      Hyaline Casts, UA None Seen /LPF      Methodology Automated Microscopy    Urinalysis With Culture If Indicated - Urine, Clean Catch [[id number]]  (Abnormal) Collected: 07/21/24 1000    Specimen: Urine, Clean Catch Updated:  07/21/24 1008     Color, UA Yellow     Appearance, UA Clear     pH, UA 8.5     Specific Gravity, UA 1.015     Glucose, UA Negative     Ketones, UA Negative     Bilirubin, UA Negative     Blood, UA Large (3+)     Protein, UA Trace     Leuk Esterase, UA Trace     Nitrite, UA Negative     Urobilinogen, UA 1.0 E.U./dL    Narrative:      In absence of clinical symptoms, the presence of pyuria, bacteria, and/or nitrites on the urinalysis result does not correlate with infection.    Comprehensive Metabolic Panel [[id number]]  (Abnormal) Collected: 07/21/24 0924    Specimen: Blood Updated: 07/21/24 0955     Glucose 126 mg/dL      BUN 17 mg/dL      Creatinine 1.02 mg/dL      Sodium 137 mmol/L      Potassium 4.1 mmol/L      Comment: Slight hemolysis detected by analyzer. Result may be falsely elevated.        Chloride 98 mmol/L      CO2 27.1 mmol/L      Calcium 9.9 mg/dL      Total Protein 8.2 g/dL      Albumin 4.7 g/dL      ALT (SGPT) 31 U/L      AST (SGOT) 37 U/L      Alkaline Phosphatase 102 U/L      Total Bilirubin 0.4 mg/dL      Globulin 3.5 gm/dL      A/G Ratio 1.3 g/dL      BUN/Creatinine Ratio 16.7     Anion Gap 11.9 mmol/L      eGFR 91.2 mL/min/1.73     Narrative:      GFR Normal >60  Chronic Kidney Disease <60  Kidney Failure <15      Lipase [[id number]]  (Normal) Collected: 07/21/24 0924    Specimen: Blood Updated: 07/21/24 0953     Lipase 29 U/L     CBC & Differential [[id number]]  (Abnormal) Collected: 07/21/24 0924    Specimen: Blood Updated: 07/21/24 0929    Narrative:      The following orders were created for panel order CBC & Differential.  Procedure                               Abnormality         Status                     ---------                               -----------         ------                     CBC Auto Differential[[id number]]        Abnormal            Final result                 Please view results for these tests on the individual orders.    CBC Auto Differential [[id number]]  (Abnormal)  Collected: 07/21/24 0924    Specimen: Blood Updated: 07/21/24 0929     WBC 10.17 10*3/mm3      RBC 5.54 10*6/mm3      Hemoglobin 15.2 g/dL      Hematocrit 45.6 %      MCV 82.3 fL      MCH 27.4 pg      MCHC 33.3 g/dL      RDW 12.7 %      RDW-SD 38.3 fl      MPV 9.5 fL      Platelets 207 10*3/mm3      Neutrophil % 82.0 %      Lymphocyte % 9.8 %      Monocyte % 7.0 %      Eosinophil % 0.5 %      Basophil % 0.3 %      Immature Grans % 0.4 %      Neutrophils, Absolute 8.34 10*3/mm3      Lymphocytes, Absolute 1.00 10*3/mm3      Monocytes, Absolute 0.71 10*3/mm3      Eosinophils, Absolute 0.05 10*3/mm3      Basophils, Absolute 0.03 10*3/mm3      Immature Grans, Absolute 0.04 10*3/mm3      nRBC 0.0 /100 WBC             Imaging Results (Most Recent)       Procedure Component Value Units Date/Time    FL < 1 Hour [[id number]] Resulted: 07/22/24 1155     Updated: 07/22/24 1155    Narrative:      This procedure was auto-finalized with no dictation required.    CT Abdomen Pelvis Without Contrast [[id number]] Collected: 07/21/24 0949     Updated: 07/21/24 0957    Narrative:      CT ABDOMEN PELVIS WO CONTRAST    Date of Exam: 7/21/2024 9:38 AM EDT    Indication: pain.    Comparison: None available.    Technique: Axial CT images were obtained of the abdomen and pelvis without the administration of contrast. Sagittal and coronal reconstructions were performed.  Automated exposure control and iterative reconstruction methods were used.    Findings:    Liver: Limited evaluation of the liver due to lack of IV contrast administration. There are innumerable liver cysts and subcentimeter hypodensities which are too small to characterize. No biliary dilation is seen.    Gallbladder: Unremarkable.    Pancreas: Unremarkable.    Spleen: Unremarkable.    Adrenal glands: Unremarkable.    Genitourinary tract: Multiple bilateral renal cysts and subcentimeter hypodensities which are too small to characterize. There appear to be 2 obstructing calculi  within the right ureteropelvic junction, larger measuring 5 mm. There is also a 4 mm   calculus at the right ureterovesical junction. Mild right hydronephrosis. 6 mm calculus or conglomerate of tiny calculi within the right kidney. No hydronephrosis on the left. The left ureter is unremarkable. Urinary bladder and prostate gland appear   unremarkable.    Gastrointestinal tract: Limited evaluation of the hollow viscera due to lack of IV contrast administration. No findings to suggest bowel obstruction.    Appendix: No findings to suggest acute appendicitis.    Other findings: No free air or free fluid. No pathologically enlarged lymph nodes are seen. Mild vascular calcifications are present.    Bones and soft tissues: No acute or suspicious osseous or soft tissue lesion is identified.    Lung bases: The visualized lung bases are clear.      Impression:      Impression:  1.2 obstructing calculi within the right ureteropelvic junction, larger measuring 5 mm. Mild right hydronephrosis. There is also a 4 mm calculus at the right ureterovesical junction.  2.Additional right nonobstructive nephrolithiasis.  3.Polycystic kidneys and liver. Renal and liver lesions are incompletely characterized on this noncontrast study.  4.Additional findings as detailed above.          Electronically Signed: Rory Brown MD    7/21/2024 9:55 AM EDT    Workstation ID: [id number]          reviewed    ECG/EMG Results (most recent)       None          reviewed    Results for orders placed during the hospital encounter of 05/27/23    Duplex Venous Lower Extremity - Bilateral    Interpretation Summary    Normal bilateral lower extremity venous duplex scan.          Microbiology Results (last 10 days)       ** No results found for the last 240 hours. **            Assessment & Plan     Ureterolithiasis     Ureterolithiasis  -Urinalysis showed large blood, trace leukocyte, RBC  -IV Rocephin start empirically for elevated white count of 12.25  -CT  abdomen/pelvis: 2 obstructing calculi within right ureteropelvic junction, larger measuring 5 mm, mild right hydronephrosis, 4 mm calculus at the right ureterovesical junction, polycystic kidneys and liver  -IV/oral analgesics and antiemetics as needed  -Continue IV fluids  -Urology consulted    Anxiety Disorder  -Continue Wellbutrin     Hypertension  BP Readings from Last 1 Encounters:   07/22/24 98/62   - Hold lisinopril and HCTZ   - Monitor while admitted        I discussed the patients findings and my recommendations with patient and family.     Discharge Diagnosis:      Ureterolithiasis      Hospital Course  Patient is a 49 y.o. male presented with right flank pain. Denies fever, vomiting, or dyspnea.  CT abdomen/pelvis: 2 obstructing calculi within right ureteropelvic junction, larger measuring 5 mm, mild right hydronephrosis, 4 mm calculus at the right ureterovesical junction, polycystic kidneys and liver. IV/oral analgesics and antiemetics given as needed. Patient continued on IV Rocephin given elevated WBC of 12.25. UA showed large blood, trace leukocytes, and RBC. Patient was seen by urology and underwent cystoscopy with stent placement.  Patient tolerated procedure well.  Patient to follow-up in 2 weeks with urology.  Patient take medication as prescribed even feeling better.  Patient not to drive while taking narcotics.  Tests and recommendations reviewed with patient and family and they agree to treatment plan.  If symptoms worsen patient call 911 or go to nearest ED.    Past Medical History:     Past Medical History:   Diagnosis Date    Arthritis     COVID-19     1/2/2021    Depression     Guillain Barré syndrome     Hand numbness     left-- cramping    High blood pressure     Leg pain     Multiple sclerosis     Optic neuritis     Pain in both feet     Pneumonia      with covid 1/2/2021    Polycystic kidney disease     Right shoulder pain 08/2022       Past Surgical History:     Past Surgical History:    Procedure Laterality Date    CYST REMOVAL      on left wrist    SHOULDER ARTHROSCOPY Right 2022    Procedure: SHOULDER ARTHROSCOPY,  DISTAL CLAVICLE RESECTION,SUBACROMIAL DECOMPRESSION;  Surgeon: Caden Bain MD;  Location: [facility] MAIN OR;  Service: Orthopedics;  Laterality: Right;    SHOULDER SURGERY Left 2018    LEFT SHOULDER SCOPE    VASECTOMY         Social History:   Social History     Socioeconomic History    Marital status: Legally    Tobacco Use    Smoking status: Former     Current packs/day: 0.00     Types: Cigarettes     Quit date: 2010     Years since quittin.5    Smokeless tobacco: Never   Vaping Use    Vaping status: Every Day    Substances: Nicotine    Devices: Disposable   Substance and Sexual Activity    Alcohol use: Not Currently     Comment: occ    Drug use: No    Sexual activity: Defer       Procedures Performed    Procedure(s):  CYSTOSCOPY URETEROSCOPY RETROGRADE PYELOGRAM HOLMIUM LASER STENT INSERTION  -------------------       Consults:   Consults       Date and Time Order Name Status Description    2024 12:16 PM Inpatient Urology Consult Completed             Condition on Discharge:     Stable    Discharge Disposition  Home or Self Care    Discharge Medications     Discharge Medications        New Medications        Instructions Start Date   nitrofurantoin (macrocrystal-monohydrate) 100 MG capsule  Commonly known as: Macrobid   100 mg, Oral, 2 Times Daily      ondansetron 4 MG tablet  Commonly known as: Zofran   4 mg, Oral, Every 8 Hours PRN             Changes to Medications        Instructions Start Date   HYDROcodone-acetaminophen 7.5-325 MG per tablet  Commonly known as: NORCO  What changed: You were already taking a medication with the same name, and this prescription was added. Make sure you understand how and when to take each.   1 tablet, Oral, 4 Times Daily PRN      HYDROcodone-acetaminophen 7.5-325 MG per tablet  Commonly known as:  JERRI  What changed: Another medication with the same name was added. Make sure you understand how and when to take each.   1 tablet, Oral, 4 Times Daily PRN, DNF before 8/23/2024   Start Date: August 23, 2024            Continue These Medications        Instructions Start Date   buPROPion  MG 24 hr tablet  Commonly known as: WELLBUTRIN XL   1 tablet, Oral, Daily      Lisdexamfetamine Dimesylate 10 MG chewable tablet   10 mg, Oral, Daily, Chew and swallow.      lisinopril-hydrochlorothiazide 10-12.5 MG per tablet  Commonly known as: PRINZIDE,ZESTORETIC   1 tablet, Oral, Daily, 200001      multivitamin with minerals tablet tablet   1 tablet, Oral, Daily      NON FORMULARY   2 tablets, Daily, Lions gisell Mushroom supplement      tadalafil 10 MG tablet  Commonly known as: CIALIS   10 mg, Oral, Daily PRN               Discharge Diet:   Diet Instructions       Diet: Cardiac Diets; Healthy Heart (2-3 Na+); Regular (IDDSI 7); Thin (IDDSI 0)      Discharge Diet: Cardiac Diets    Cardiac Diet: Healthy Heart (2-3 Na+)    Texture: Regular (IDDSI 7)    Fluid Consistency: Thin (IDDSI 0)            Activity at Discharge:   Activity Instructions       Activity as Tolerated      Driving Restrictions      Type of Restriction: Driving    Driving Restrictions: No Driving While Taking Narcotics    Measure Blood Pressure              Follow-up Appointments  Future Appointments   Date Time Provider Department Center   8/9/2024  8:45 AM Brittani Birmingham APRN [facility]   10/17/2024  8:40 AM Eda Pemberton MD [facility]     Additional Instructions for the Follow-ups that You Need to Schedule       Discharge Follow-up with PCP   As directed       Currently Documented PCP:    Brittani Birmingham APRN    PCP Phone Number:    [phone]     Follow Up Details: 7-10 days                Test Results Pending at Discharge       Risk for Readmission (LACE) Score: 1 (7/22/2024  6:00 AM)          Nina Lopez  INDERJIT  07/22/24  12:22 EDT

## 2024-07-22 NOTE — CONSULTS
[facility] CONSULT      Patient Identification:  NAME:  Andrea Dumont  Age:  49 y.o.   Sex:  male   :  1975   MRN:  [id number]       Chief complaint/Reason for consult: right stones    History of present illness:  49 y.o. male with R UVJ, R prox and R renal stones, no left sided stones. Severe pain, admitted. Known PKD. No UTI      Past medical history:  Past Medical History:   Diagnosis Date    Arthritis     COVID-19     2021    Depression     Guillain Barré syndrome     Hand numbness     left-- cramping    High blood pressure     Leg pain     Multiple sclerosis     Optic neuritis     Pain in both feet     Pneumonia      with covid 2021    Polycystic kidney disease     Right shoulder pain 2022       Past surgical history:  Past Surgical History:   Procedure Laterality Date    CYST REMOVAL      on left wrist    SHOULDER ARTHROSCOPY Right 2022    Procedure: SHOULDER ARTHROSCOPY,  DISTAL CLAVICLE RESECTION,SUBACROMIAL DECOMPRESSION;  Surgeon: Caden Bain MD;  Location: [facility] MAIN OR;  Service: Orthopedics;  Laterality: Right;    SHOULDER SURGERY Left 2018    LEFT SHOULDER SCOPE    VASECTOMY         Allergies:  Codeine, Nsaids, and Tramadol    Home medications:  Medications Prior to Admission   Medication Sig Dispense Refill Last Dose    buPROPion XL (WELLBUTRIN XL) 300 MG 24 hr tablet Take 1 tablet by mouth Daily.   Past Week    [START ON 2024] HYDROcodone-acetaminophen (NORCO) 7.5-325 MG per tablet Take 1 tablet by mouth 4 (Four) Times a Day As Needed for Severe Pain. DNF before 2024 120 tablet 0     Lisdexamfetamine Dimesylate 10 MG chewable tablet CHEW AND SWALLOW 1 TABLET BY MOUTH EVERY DAY 30 tablet 0 2024    lisinopril-hydrochlorothiazide (PRINZIDE,ZESTORETIC) 10-12.5 MG per tablet Take 1 tablet by mouth Daily. 2024    multivitamin with minerals (MENS MULTIVITAMIN PO) Take 1 tablet by mouth Daily.   2024    NON FORMULARY 2  tablets Daily. Ashanti jameson  Mushroom supplement   2024    tadalafil (CIALIS) 10 MG tablet Take 1 tablet by mouth Daily As Needed.           Hospital medications:  buPROPion XL, 300 mg, Oral, Daily  cefTRIAXone, 2,000 mg, Intravenous, Q24H  lisinopril, 10 mg, Oral, Q24H   And  hydroCHLOROthiazide, 12.5 mg, Oral, Q24H  sodium chloride, 10 mL, Intravenous, Q12H      sodium chloride, 100 mL/hr, Last Rate: 100 mL/hr (24 0904)        aluminum-magnesium hydroxide-simethicone    senna-docusate sodium **AND** polyethylene glycol **AND** bisacodyl **AND** bisacodyl    [START ON 2024] HYDROcodone-acetaminophen    HYDROmorphone    nitroglycerin    ondansetron ODT **OR** ondansetron    [COMPLETED] Insert Peripheral IV **AND** sodium chloride    sodium chloride    sodium chloride    Family history:  Family History   Problem Relation Age of Onset    Dementia Mother     Hyperlipidemia Father     Transient ischemic attack Father     Cancer Other     Diabetes Other        Social history:  Social History     Tobacco Use    Smoking status: Former     Current packs/day: 0.00     Types: Cigarettes     Quit date: 2010     Years since quittin.5    Smokeless tobacco: Never   Vaping Use    Vaping status: Every Day    Substances: Nicotine    Devices: Disposable   Substance Use Topics    Alcohol use: Not Currently     Comment: occ    Drug use: No       Objective:  TMax 24 hours:   Temp (24hrs), Av.9 °F (36.6 °C), Min:97.7 °F (36.5 °C), Max:98.1 °F (36.7 °C)      Vitals Ranges:   Temp:  [97.7 °F (36.5 °C)-98.1 °F (36.7 °C)] 97.9 °F (36.6 °C)  Heart Rate:  [] 82  Resp:  [15-20] 18  BP: ()/(63-96) 97/65    Intake/Output Last 3 shifts:  No intake/output data recorded.     Physical Exam:    General Appearance:    Alert, cooperative, NAD   Lungs:     Respirations unlabored, no audible wheezing    Heart:    No cyanosis   Abdomen:     Soft, ND    :    No suprapubic distention              Results review:   I  reviewed the patient's new clinical results.    Data review:  Lab Results (last 24 hours)       Procedure Component Value Units Date/Time    Basic Metabolic Panel [[id number]]  (Abnormal) Collected: 07/22/24 0339    Specimen: Blood from Hand, Right Updated: 07/22/24 0510     Glucose 121 mg/dL      BUN 13 mg/dL      Creatinine 0.91 mg/dL      Sodium 137 mmol/L      Potassium 3.5 mmol/L      Chloride 100 mmol/L      CO2 26.0 mmol/L      Calcium 8.8 mg/dL      BUN/Creatinine Ratio 14.3     Anion Gap 11.0 mmol/L      eGFR 104.6 mL/min/1.73     Narrative:      GFR Normal >60  Chronic Kidney Disease <60  Kidney Failure <15      CBC & Differential [[id number]]  (Abnormal) Collected: 07/22/24 0339    Specimen: Blood from Hand, Right Updated: 07/22/24 0450    Narrative:      The following orders were created for panel order CBC & Differential.  Procedure                               Abnormality         Status                     ---------                               -----------         ------                     CBC Auto Differential[[id number]]        Abnormal            Final result                 Please view results for these tests on the individual orders.    CBC Auto Differential [[id number]]  (Abnormal) Collected: 07/22/24 0339    Specimen: Blood from Hand, Right Updated: 07/22/24 0450     WBC 12.25 10*3/mm3      RBC 5.01 10*6/mm3      Hemoglobin 13.8 g/dL      Hematocrit 41.5 %      MCV 82.8 fL      MCH 27.5 pg      MCHC 33.3 g/dL      RDW 12.9 %      RDW-SD 38.8 fl      MPV 10.6 fL      Platelets 203 10*3/mm3      Neutrophil % 72.0 %      Lymphocyte % 16.8 %      Monocyte % 9.8 %      Eosinophil % 0.9 %      Basophil % 0.2 %      Immature Grans % 0.3 %      Neutrophils, Absolute 8.82 10*3/mm3      Lymphocytes, Absolute 2.06 10*3/mm3      Monocytes, Absolute 1.20 10*3/mm3      Eosinophils, Absolute 0.11 10*3/mm3      Basophils, Absolute 0.02 10*3/mm3      Immature Grans, Absolute 0.04 10*3/mm3      nRBC 0.0 /100 WBC      Urinalysis, Microscopic Only - Urine, Clean Catch [[id number]]  (Abnormal) Collected: 07/21/24 1000    Specimen: Urine, Clean Catch Updated: 07/21/24 1011     RBC, UA Too Numerous to Count /HPF      WBC, UA 0-2 /HPF      Comment: Urine culture not indicated.        Bacteria, UA None Seen /HPF      Squamous Epithelial Cells, UA 0-2 /HPF      Hyaline Casts, UA None Seen /LPF      Methodology Automated Microscopy    Urinalysis With Culture If Indicated - Urine, Clean Catch [[id number]]  (Abnormal) Collected: 07/21/24 1000    Specimen: Urine, Clean Catch Updated: 07/21/24 1008     Color, UA Yellow     Appearance, UA Clear     pH, UA 8.5     Specific Gravity, UA 1.015     Glucose, UA Negative     Ketones, UA Negative     Bilirubin, UA Negative     Blood, UA Large (3+)     Protein, UA Trace     Leuk Esterase, UA Trace     Nitrite, UA Negative     Urobilinogen, UA 1.0 E.U./dL    Narrative:      In absence of clinical symptoms, the presence of pyuria, bacteria, and/or nitrites on the urinalysis result does not correlate with infection.    Comprehensive Metabolic Panel [[id number]]  (Abnormal) Collected: 07/21/24 0924    Specimen: Blood Updated: 07/21/24 0955     Glucose 126 mg/dL      BUN 17 mg/dL      Creatinine 1.02 mg/dL      Sodium 137 mmol/L      Potassium 4.1 mmol/L      Comment: Slight hemolysis detected by analyzer. Result may be falsely elevated.        Chloride 98 mmol/L      CO2 27.1 mmol/L      Calcium 9.9 mg/dL      Total Protein 8.2 g/dL      Albumin 4.7 g/dL      ALT (SGPT) 31 U/L      AST (SGOT) 37 U/L      Alkaline Phosphatase 102 U/L      Total Bilirubin 0.4 mg/dL      Globulin 3.5 gm/dL      A/G Ratio 1.3 g/dL      BUN/Creatinine Ratio 16.7     Anion Gap 11.9 mmol/L      eGFR 91.2 mL/min/1.73     Narrative:      GFR Normal >60  Chronic Kidney Disease <60  Kidney Failure <15      Lipase [[id number]]  (Normal) Collected: 07/21/24 0924    Specimen: Blood Updated: 07/21/24 0953     Lipase 29 U/L     CBC &  Differential [[id number]]  (Abnormal) Collected: 07/21/24 0924    Specimen: Blood Updated: 07/21/24 0929    Narrative:      The following orders were created for panel order CBC & Differential.  Procedure                               Abnormality         Status                     ---------                               -----------         ------                     CBC Auto Differential[[id number]]        Abnormal            Final result                 Please view results for these tests on the individual orders.    CBC Auto Differential [[id number]]  (Abnormal) Collected: 07/21/24 0924    Specimen: Blood Updated: 07/21/24 0929     WBC 10.17 10*3/mm3      RBC 5.54 10*6/mm3      Hemoglobin 15.2 g/dL      Hematocrit 45.6 %      MCV 82.3 fL      MCH 27.4 pg      MCHC 33.3 g/dL      RDW 12.7 %      RDW-SD 38.3 fl      MPV 9.5 fL      Platelets 207 10*3/mm3      Neutrophil % 82.0 %      Lymphocyte % 9.8 %      Monocyte % 7.0 %      Eosinophil % 0.5 %      Basophil % 0.3 %      Immature Grans % 0.4 %      Neutrophils, Absolute 8.34 10*3/mm3      Lymphocytes, Absolute 1.00 10*3/mm3      Monocytes, Absolute 0.71 10*3/mm3      Eosinophils, Absolute 0.05 10*3/mm3      Basophils, Absolute 0.03 10*3/mm3      Immature Grans, Absolute 0.04 10*3/mm3      nRBC 0.0 /100 WBC              Imaging:  Imaging Results (Last 24 Hours)       Procedure Component Value Units Date/Time    CT Abdomen Pelvis Without Contrast [[id number]] Collected: 07/21/24 0949     Updated: 07/21/24 0957    Narrative:      CT ABDOMEN PELVIS WO CONTRAST    Date of Exam: 7/21/2024 9:38 AM EDT    Indication: pain.    Comparison: None available.    Technique: Axial CT images were obtained of the abdomen and pelvis without the administration of contrast. Sagittal and coronal reconstructions were performed.  Automated exposure control and iterative reconstruction methods were used.    Findings:    Liver: Limited evaluation of the liver due to lack of IV contrast  administration. There are innumerable liver cysts and subcentimeter hypodensities which are too small to characterize. No biliary dilation is seen.    Gallbladder: Unremarkable.    Pancreas: Unremarkable.    Spleen: Unremarkable.    Adrenal glands: Unremarkable.    Genitourinary tract: Multiple bilateral renal cysts and subcentimeter hypodensities which are too small to characterize. There appear to be 2 obstructing calculi within the right ureteropelvic junction, larger measuring 5 mm. There is also a 4 mm   calculus at the right ureterovesical junction. Mild right hydronephrosis. 6 mm calculus or conglomerate of tiny calculi within the right kidney. No hydronephrosis on the left. The left ureter is unremarkable. Urinary bladder and prostate gland appear   unremarkable.    Gastrointestinal tract: Limited evaluation of the hollow viscera due to lack of IV contrast administration. No findings to suggest bowel obstruction.    Appendix: No findings to suggest acute appendicitis.    Other findings: No free air or free fluid. No pathologically enlarged lymph nodes are seen. Mild vascular calcifications are present.    Bones and soft tissues: No acute or suspicious osseous or soft tissue lesion is identified.    Lung bases: The visualized lung bases are clear.      Impression:      Impression:  1.2 obstructing calculi within the right ureteropelvic junction, larger measuring 5 mm. Mild right hydronephrosis. There is also a 4 mm calculus at the right ureterovesical junction.  2.Additional right nonobstructive nephrolithiasis.  3.Polycystic kidneys and liver. Renal and liver lesions are incompletely characterized on this noncontrast study.  4.Additional findings as detailed above.          Electronically Signed: Rory Brown MD    7/21/2024 9:55 AM EDT    Workstation ID: [id number]               Assessment:       Ureterolithiasis      Right UVJ stone 4mm  Right prox stone 5mm  Right renal stone 6mm  PKD    Plan:     CT  images reviewed with obstructing stones and PKD  Discussed options, discussed distal stone may pass but prox stone has a long way to go  He had severe pain and desires intervention, discussed can likely get distal stone out but may not get prox stone or renal stones and may require additional procedures, discussed need for stent and stent related issues and that one of my partners may perform the procedure today  All risks, benefits and alternatives to surgery were discussed with the patient preoperatively including but not limited to need for multiple procedures, infection, sepsis, bleeding, risk of anesthesia and damage to other organs. The details of the procedure have been fully reviewed with the patient and informed consent has been obtained.      Livan Latif MD  [facility]  Count includes the [facility] [facility], [address]  [place] [address]  Office: [phone]  Available via [facility] Secure Chat  07/22/24  09:20 EDT

## 2024-07-22 NOTE — H&P (VIEW-ONLY)
[facility]      Patient Identification:  NAME:  Andrea Dumont  Age:  49 y.o.   Sex:  male   :  1975   MRN:  [id number]       Chief complaint/Reason for consult: right stones    History of present illness:  49 y.o. male with R UVJ, R prox and R renal stones, no left sided stones. Severe pain, admitted. Known PKD. No UTI      Past medical history:  Past Medical History:   Diagnosis Date    Arthritis     COVID-19     2021    Depression     Guillain Barré syndrome     Hand numbness     left-- cramping    High blood pressure     Leg pain     Multiple sclerosis     Optic neuritis     Pain in both feet     Pneumonia      with covid 2021    Polycystic kidney disease     Right shoulder pain 2022       Past surgical history:  Past Surgical History:   Procedure Laterality Date    CYST REMOVAL      on left wrist    SHOULDER ARTHROSCOPY Right 2022    Procedure: SHOULDER ARTHROSCOPY,  DISTAL CLAVICLE RESECTION,SUBACROMIAL DECOMPRESSION;  Surgeon: Caden Bain MD;  Location: [facility] MAIN OR;  Service: Orthopedics;  Laterality: Right;    SHOULDER SURGERY Left 2018    LEFT SHOULDER SCOPE    VASECTOMY         Allergies:  Codeine, Nsaids, and Tramadol    Home medications:  Medications Prior to Admission   Medication Sig Dispense Refill Last Dose    buPROPion XL (WELLBUTRIN XL) 300 MG 24 hr tablet Take 1 tablet by mouth Daily.   Past Week    [START ON 2024] HYDROcodone-acetaminophen (NORCO) 7.5-325 MG per tablet Take 1 tablet by mouth 4 (Four) Times a Day As Needed for Severe Pain. DNF before 2024 120 tablet 0     Lisdexamfetamine Dimesylate 10 MG chewable tablet CHEW AND SWALLOW 1 TABLET BY MOUTH EVERY DAY 30 tablet 0 2024    lisinopril-hydrochlorothiazide (PRINZIDE,ZESTORETIC) 10-12.5 MG per tablet Take 1 tablet by mouth Daily. 2024    multivitamin with minerals (MENS MULTIVITAMIN PO) Take 1 tablet by mouth Daily.   2024    NON FORMULARY 2  tablets Daily. Ashanti jameson  Mushroom supplement   2024    tadalafil (CIALIS) 10 MG tablet Take 1 tablet by mouth Daily As Needed.           Hospital medications:  buPROPion XL, 300 mg, Oral, Daily  cefTRIAXone, 2,000 mg, Intravenous, Q24H  lisinopril, 10 mg, Oral, Q24H   And  hydroCHLOROthiazide, 12.5 mg, Oral, Q24H  sodium chloride, 10 mL, Intravenous, Q12H      sodium chloride, 100 mL/hr, Last Rate: 100 mL/hr (24 0904)        aluminum-magnesium hydroxide-simethicone    senna-docusate sodium **AND** polyethylene glycol **AND** bisacodyl **AND** bisacodyl    [START ON 2024] HYDROcodone-acetaminophen    HYDROmorphone    nitroglycerin    ondansetron ODT **OR** ondansetron    [COMPLETED] Insert Peripheral IV **AND** sodium chloride    sodium chloride    sodium chloride    Family history:  Family History   Problem Relation Age of Onset    Dementia Mother     Hyperlipidemia Father     Transient ischemic attack Father     Cancer Other     Diabetes Other        Social history:  Social History     Tobacco Use    Smoking status: Former     Current packs/day: 0.00     Types: Cigarettes     Quit date: 2010     Years since quittin.5    Smokeless tobacco: Never   Vaping Use    Vaping status: Every Day    Substances: Nicotine    Devices: Disposable   Substance Use Topics    Alcohol use: Not Currently     Comment: occ    Drug use: No       Objective:  TMax 24 hours:   Temp (24hrs), Av.9 °F (36.6 °C), Min:97.7 °F (36.5 °C), Max:98.1 °F (36.7 °C)      Vitals Ranges:   Temp:  [97.7 °F (36.5 °C)-98.1 °F (36.7 °C)] 97.9 °F (36.6 °C)  Heart Rate:  [] 82  Resp:  [15-20] 18  BP: ()/(63-96) 97/65    Intake/Output Last 3 shifts:  No intake/output data recorded.     Physical Exam:    General Appearance:    Alert, cooperative, NAD   Lungs:     Respirations unlabored, no audible wheezing    Heart:    No cyanosis   Abdomen:     Soft, ND    :    No suprapubic distention              Results review:   I  reviewed the patient's new clinical results.    Data review:  Lab Results (last 24 hours)       Procedure Component Value Units Date/Time    Basic Metabolic Panel [[id number]]  (Abnormal) Collected: 07/22/24 0339    Specimen: Blood from Hand, Right Updated: 07/22/24 0510     Glucose 121 mg/dL      BUN 13 mg/dL      Creatinine 0.91 mg/dL      Sodium 137 mmol/L      Potassium 3.5 mmol/L      Chloride 100 mmol/L      CO2 26.0 mmol/L      Calcium 8.8 mg/dL      BUN/Creatinine Ratio 14.3     Anion Gap 11.0 mmol/L      eGFR 104.6 mL/min/1.73     Narrative:      GFR Normal >60  Chronic Kidney Disease <60  Kidney Failure <15      CBC & Differential [[id number]]  (Abnormal) Collected: 07/22/24 0339    Specimen: Blood from Hand, Right Updated: 07/22/24 0450    Narrative:      The following orders were created for panel order CBC & Differential.  Procedure                               Abnormality         Status                     ---------                               -----------         ------                     CBC Auto Differential[[id number]]        Abnormal            Final result                 Please view results for these tests on the individual orders.    CBC Auto Differential [[id number]]  (Abnormal) Collected: 07/22/24 0339    Specimen: Blood from Hand, Right Updated: 07/22/24 0450     WBC 12.25 10*3/mm3      RBC 5.01 10*6/mm3      Hemoglobin 13.8 g/dL      Hematocrit 41.5 %      MCV 82.8 fL      MCH 27.5 pg      MCHC 33.3 g/dL      RDW 12.9 %      RDW-SD 38.8 fl      MPV 10.6 fL      Platelets 203 10*3/mm3      Neutrophil % 72.0 %      Lymphocyte % 16.8 %      Monocyte % 9.8 %      Eosinophil % 0.9 %      Basophil % 0.2 %      Immature Grans % 0.3 %      Neutrophils, Absolute 8.82 10*3/mm3      Lymphocytes, Absolute 2.06 10*3/mm3      Monocytes, Absolute 1.20 10*3/mm3      Eosinophils, Absolute 0.11 10*3/mm3      Basophils, Absolute 0.02 10*3/mm3      Immature Grans, Absolute 0.04 10*3/mm3      nRBC 0.0 /100 WBC      Urinalysis, Microscopic Only - Urine, Clean Catch [[id number]]  (Abnormal) Collected: 07/21/24 1000    Specimen: Urine, Clean Catch Updated: 07/21/24 1011     RBC, UA Too Numerous to Count /HPF      WBC, UA 0-2 /HPF      Comment: Urine culture not indicated.        Bacteria, UA None Seen /HPF      Squamous Epithelial Cells, UA 0-2 /HPF      Hyaline Casts, UA None Seen /LPF      Methodology Automated Microscopy    Urinalysis With Culture If Indicated - Urine, Clean Catch [[id number]]  (Abnormal) Collected: 07/21/24 1000    Specimen: Urine, Clean Catch Updated: 07/21/24 1008     Color, UA Yellow     Appearance, UA Clear     pH, UA 8.5     Specific Gravity, UA 1.015     Glucose, UA Negative     Ketones, UA Negative     Bilirubin, UA Negative     Blood, UA Large (3+)     Protein, UA Trace     Leuk Esterase, UA Trace     Nitrite, UA Negative     Urobilinogen, UA 1.0 E.U./dL    Narrative:      In absence of clinical symptoms, the presence of pyuria, bacteria, and/or nitrites on the urinalysis result does not correlate with infection.    Comprehensive Metabolic Panel [[id number]]  (Abnormal) Collected: 07/21/24 0924    Specimen: Blood Updated: 07/21/24 0955     Glucose 126 mg/dL      BUN 17 mg/dL      Creatinine 1.02 mg/dL      Sodium 137 mmol/L      Potassium 4.1 mmol/L      Comment: Slight hemolysis detected by analyzer. Result may be falsely elevated.        Chloride 98 mmol/L      CO2 27.1 mmol/L      Calcium 9.9 mg/dL      Total Protein 8.2 g/dL      Albumin 4.7 g/dL      ALT (SGPT) 31 U/L      AST (SGOT) 37 U/L      Alkaline Phosphatase 102 U/L      Total Bilirubin 0.4 mg/dL      Globulin 3.5 gm/dL      A/G Ratio 1.3 g/dL      BUN/Creatinine Ratio 16.7     Anion Gap 11.9 mmol/L      eGFR 91.2 mL/min/1.73     Narrative:      GFR Normal >60  Chronic Kidney Disease <60  Kidney Failure <15      Lipase [[id number]]  (Normal) Collected: 07/21/24 0924    Specimen: Blood Updated: 07/21/24 0953     Lipase 29 U/L     CBC &  Differential [[id number]]  (Abnormal) Collected: 07/21/24 0924    Specimen: Blood Updated: 07/21/24 0929    Narrative:      The following orders were created for panel order CBC & Differential.  Procedure                               Abnormality         Status                     ---------                               -----------         ------                     CBC Auto Differential[[id number]]        Abnormal            Final result                 Please view results for these tests on the individual orders.    CBC Auto Differential [[id number]]  (Abnormal) Collected: 07/21/24 0924    Specimen: Blood Updated: 07/21/24 0929     WBC 10.17 10*3/mm3      RBC 5.54 10*6/mm3      Hemoglobin 15.2 g/dL      Hematocrit 45.6 %      MCV 82.3 fL      MCH 27.4 pg      MCHC 33.3 g/dL      RDW 12.7 %      RDW-SD 38.3 fl      MPV 9.5 fL      Platelets 207 10*3/mm3      Neutrophil % 82.0 %      Lymphocyte % 9.8 %      Monocyte % 7.0 %      Eosinophil % 0.5 %      Basophil % 0.3 %      Immature Grans % 0.4 %      Neutrophils, Absolute 8.34 10*3/mm3      Lymphocytes, Absolute 1.00 10*3/mm3      Monocytes, Absolute 0.71 10*3/mm3      Eosinophils, Absolute 0.05 10*3/mm3      Basophils, Absolute 0.03 10*3/mm3      Immature Grans, Absolute 0.04 10*3/mm3      nRBC 0.0 /100 WBC              Imaging:  Imaging Results (Last 24 Hours)       Procedure Component Value Units Date/Time    CT Abdomen Pelvis Without Contrast [[id number]] Collected: 07/21/24 0949     Updated: 07/21/24 0957    Narrative:      CT ABDOMEN PELVIS WO CONTRAST    Date of Exam: 7/21/2024 9:38 AM EDT    Indication: pain.    Comparison: None available.    Technique: Axial CT images were obtained of the abdomen and pelvis without the administration of contrast. Sagittal and coronal reconstructions were performed.  Automated exposure control and iterative reconstruction methods were used.    Findings:    Liver: Limited evaluation of the liver due to lack of IV contrast  administration. There are innumerable liver cysts and subcentimeter hypodensities which are too small to characterize. No biliary dilation is seen.    Gallbladder: Unremarkable.    Pancreas: Unremarkable.    Spleen: Unremarkable.    Adrenal glands: Unremarkable.    Genitourinary tract: Multiple bilateral renal cysts and subcentimeter hypodensities which are too small to characterize. There appear to be 2 obstructing calculi within the right ureteropelvic junction, larger measuring 5 mm. There is also a 4 mm   calculus at the right ureterovesical junction. Mild right hydronephrosis. 6 mm calculus or conglomerate of tiny calculi within the right kidney. No hydronephrosis on the left. The left ureter is unremarkable. Urinary bladder and prostate gland appear   unremarkable.    Gastrointestinal tract: Limited evaluation of the hollow viscera due to lack of IV contrast administration. No findings to suggest bowel obstruction.    Appendix: No findings to suggest acute appendicitis.    Other findings: No free air or free fluid. No pathologically enlarged lymph nodes are seen. Mild vascular calcifications are present.    Bones and soft tissues: No acute or suspicious osseous or soft tissue lesion is identified.    Lung bases: The visualized lung bases are clear.      Impression:      Impression:  1.2 obstructing calculi within the right ureteropelvic junction, larger measuring 5 mm. Mild right hydronephrosis. There is also a 4 mm calculus at the right ureterovesical junction.  2.Additional right nonobstructive nephrolithiasis.  3.Polycystic kidneys and liver. Renal and liver lesions are incompletely characterized on this noncontrast study.  4.Additional findings as detailed above.          Electronically Signed: Rory Brown MD    7/21/2024 9:55 AM EDT    Workstation ID: [id number]               Assessment:       Ureterolithiasis      Right UVJ stone 4mm  Right prox stone 5mm  Right renal stone 6mm  PKD    Plan:     CT  images reviewed with obstructing stones and PKD  Discussed options, discussed distal stone may pass but prox stone has a long way to go  He had severe pain and desires intervention, discussed can likely get distal stone out but may not get prox stone or renal stones and may require additional procedures, discussed need for stent and stent related issues and that one of my partners may perform the procedure today  All risks, benefits and alternatives to surgery were discussed with the patient preoperatively including but not limited to need for multiple procedures, infection, sepsis, bleeding, risk of anesthesia and damage to other organs. The details of the procedure have been fully reviewed with the patient and informed consent has been obtained.      Livan Latif MD  [facility]  [facility], [address]  [place] [address]  Office: [phone]  Available via [employer] Secure Chat  07/22/24  09:20 EDT

## 2024-07-23 ENCOUNTER — TRANSITIONAL CARE MANAGEMENT TELEPHONE ENCOUNTER (OUTPATIENT)
Dept: CALL CENTER | Facility: HOSPITAL | Age: 49
End: 2024-07-23
Payer: COMMERCIAL

## 2024-07-23 NOTE — OUTREACH NOTE
Call Center TCM Note      Flowsheet Row Responses   [facility] patient discharged from? [facility]   Does the patient have one of the following disease processes/diagnoses(primary or secondary)? Other   TCM attempt successful? Yes   Call start time 0844   Call end time 0846   Discharge diagnosis Ureterolithiasis, CYSTOSCOPY URETEROSCOPY RETROGRADE PYELOGRAM STENT INSERTION   Meds reviewed with patient/caregiver? Yes   Is the patient having any side effects they believe may be caused by any medication additions or changes? No   Does the patient have all medications ordered at discharge? Yes   Is the patient taking all medications as directed (includes completed medication regime)? Yes   Does the patient have an appointment with their PCP within 7-14 days of discharge? No   Nursing Interventions Patient declined scheduling/rescheduling appointment at this time, Patient desires to follow up with specialty only   Has home health visited the patient within 72 hours of discharge? N/A   Psychosocial issues? No   Did the patient receive a copy of their discharge instructions? Yes   Nursing interventions Reviewed instructions with patient   What is the patient's perception of their health status since discharge? Improving   Is the patient/caregiver able to teach back signs and symptoms related to disease process for when to call PCP? Yes   Is the patient/caregiver able to teach back signs and symptoms related to disease process for when to call 911? Yes   Is the patient/caregiver able to teach back the hierarchy of who to call/visit for symptoms/problems? PCP, Specialist, Home health nurse, Urgent Care, ED, 911 Yes   If the patient is a current smoker, are they able to teach back resources for cessation? Not a smoker   TCM call completed? Yes   Call end time 0846   Would this patient benefit from a Referral to Amb Social Work? No   Is the patient interested in additional calls from an ambulatory ? No             Kait Rene LPN    7/23/2024, 08:55 EDT

## 2024-07-23 NOTE — CASE MANAGEMENT/SOCIAL WORK
Case Management Discharge Note      Final Note: Routine home         Selected Continued Care - Discharged on 7/22/2024 Admission date: 7/21/2024 - Discharge disposition: Home or Self Care       Transportation Services  Private: Car    Final Discharge Disposition Code: 01 - home or self-care

## 2024-07-24 PROBLEM — N20.0 KIDNEY STONE: Status: ACTIVE | Noted: 2024-07-24

## 2024-07-26 ENCOUNTER — HOSPITAL ENCOUNTER (OUTPATIENT)
Dept: CARDIOLOGY | Facility: HOSPITAL | Age: 49
Discharge: HOME | End: 2024-07-26
Admitting: UROLOGY
Payer: COMMERCIAL

## 2024-07-26 PROCEDURE — 93005 ELECTROCARDIOGRAM TRACING: CPT | Performed by: UROLOGY

## 2024-07-28 LAB
QT INTERVAL: 374 MS
QTC INTERVAL: 418 MS

## 2024-07-29 RX ORDER — BUPROPION HYDROCHLORIDE 300 MG/1
300 TABLET ORAL DAILY
Qty: 90 TABLET | Refills: 1 | Status: SHIPPED | OUTPATIENT
Start: 2024-07-29

## 2024-08-04 ENCOUNTER — ANESTHESIA EVENT (OUTPATIENT)
Dept: PERIOP | Facility: HOSPITAL | Age: 49
End: 2024-08-04
Payer: COMMERCIAL

## 2024-08-04 NOTE — ANESTHESIA PREPROCEDURE EVALUATION
Anesthesia Evaluation     Patient summary reviewed and Nursing notes reviewed   NPO Solid Status: > 8 hours  NPO Liquid Status: > 8 hours           Airway   Mallampati: II  TM distance: >3 FB  Neck ROM: full  No difficulty expected  Dental - normal exam     Pulmonary - normal exam   Cardiovascular - normal exam    ECG reviewed    (+) hypertension      Neuro/Psych  GI/Hepatic/Renal/Endo    (+) obesity, renal disease- stones    Musculoskeletal     Abdominal  - normal exam    Bowel sounds: normal.   Substance History      OB/GYN          Other   arthritis,                 Anesthesia Plan    ASA 2     general     intravenous induction     Anesthetic plan, risks, benefits, and alternatives have been provided, discussed and informed consent has been obtained with: patient.    Plan discussed with CRNA.    CODE STATUS:

## 2024-08-05 ENCOUNTER — ANESTHESIA (OUTPATIENT)
Dept: PERIOP | Facility: HOSPITAL | Age: 49
End: 2024-08-05
Payer: COMMERCIAL

## 2024-08-05 ENCOUNTER — APPOINTMENT (OUTPATIENT)
Dept: GENERAL RADIOLOGY | Facility: HOSPITAL | Age: 49
End: 2024-08-05
Payer: COMMERCIAL

## 2024-08-05 ENCOUNTER — HOSPITAL ENCOUNTER (OUTPATIENT)
Facility: HOSPITAL | Age: 49
Setting detail: HOSPITAL OUTPATIENT SURGERY
Discharge: HOME | End: 2024-08-05
Attending: UROLOGY | Admitting: UROLOGY
Payer: COMMERCIAL

## 2024-08-05 VITALS
BODY MASS INDEX: 33.68 KG/M2 | TEMPERATURE: 98.5 F | HEART RATE: 96 BPM | WEIGHT: 240.6 LBS | OXYGEN SATURATION: 96 % | DIASTOLIC BLOOD PRESSURE: 90 MMHG | RESPIRATION RATE: 17 BRPM | HEIGHT: 71 IN | SYSTOLIC BLOOD PRESSURE: 143 MMHG

## 2024-08-05 DIAGNOSIS — N20.0 KIDNEY STONE: Primary | ICD-10-CM

## 2024-08-05 DIAGNOSIS — N20.1 URETEROLITHIASIS: ICD-10-CM

## 2024-08-05 PROCEDURE — 25010000002 FENTANYL CITRATE (PF) 100 MCG/2ML SOLUTION: Performed by: NURSE ANESTHETIST, CERTIFIED REGISTERED

## 2024-08-05 PROCEDURE — 76000 FLUOROSCOPY <1 HR PHYS/QHP: CPT

## 2024-08-05 PROCEDURE — C1769 GUIDE WIRE: HCPCS | Performed by: UROLOGY

## 2024-08-05 PROCEDURE — 25010000002 KETOROLAC TROMETHAMINE PER 15 MG: Performed by: NURSE ANESTHETIST, CERTIFIED REGISTERED

## 2024-08-05 PROCEDURE — 25010000002 ONDANSETRON PER 1 MG: Performed by: NURSE ANESTHETIST, CERTIFIED REGISTERED

## 2024-08-05 PROCEDURE — 25010000002 MEPERIDINE PER 100 MG: Performed by: NURSE ANESTHETIST, CERTIFIED REGISTERED

## 2024-08-05 PROCEDURE — C1758 CATHETER, URETERAL: HCPCS | Performed by: UROLOGY

## 2024-08-05 PROCEDURE — 25010000002 DEXAMETHASONE PER 1 MG: Performed by: NURSE ANESTHETIST, CERTIFIED REGISTERED

## 2024-08-05 PROCEDURE — 25010000002 HYDROMORPHONE 1 MG/ML SOLUTION: Performed by: NURSE ANESTHETIST, CERTIFIED REGISTERED

## 2024-08-05 PROCEDURE — 25810000003 LACTATED RINGERS PER 1000 ML: Performed by: ANESTHESIOLOGY

## 2024-08-05 PROCEDURE — 25010000002 PROPOFOL 10 MG/ML EMULSION: Performed by: NURSE ANESTHETIST, CERTIFIED REGISTERED

## 2024-08-05 PROCEDURE — C2617 STENT, NON-COR, TEM W/O DEL: HCPCS | Performed by: UROLOGY

## 2024-08-05 PROCEDURE — C1894 INTRO/SHEATH, NON-LASER: HCPCS | Performed by: UROLOGY

## 2024-08-05 PROCEDURE — 25010000002 CEFAZOLIN PER 500 MG: Performed by: UROLOGY

## 2024-08-05 DEVICE — URETERAL STENT
Type: IMPLANTABLE DEVICE | Site: URETER | Status: FUNCTIONAL
Brand: PERCUFLEX™ PLUS

## 2024-08-05 RX ORDER — PROPOFOL 10 MG/ML
VIAL (ML) INTRAVENOUS AS NEEDED
Status: DISCONTINUED | OUTPATIENT
Start: 2024-08-05 | End: 2024-08-05 | Stop reason: SURG

## 2024-08-05 RX ORDER — OXYCODONE HYDROCHLORIDE 5 MG/1
5 TABLET ORAL ONCE AS NEEDED
Status: DISCONTINUED | OUTPATIENT
Start: 2024-08-05 | End: 2024-08-05 | Stop reason: HOSPADM

## 2024-08-05 RX ORDER — FENTANYL CITRATE 50 UG/ML
INJECTION, SOLUTION INTRAMUSCULAR; INTRAVENOUS AS NEEDED
Status: DISCONTINUED | OUTPATIENT
Start: 2024-08-05 | End: 2024-08-05 | Stop reason: SURG

## 2024-08-05 RX ORDER — ONDANSETRON 2 MG/ML
4 INJECTION INTRAMUSCULAR; INTRAVENOUS ONCE AS NEEDED
Status: DISCONTINUED | OUTPATIENT
Start: 2024-08-05 | End: 2024-08-05 | Stop reason: HOSPADM

## 2024-08-05 RX ORDER — HYDROCODONE BITARTRATE AND ACETAMINOPHEN 10; 325 MG/1; MG/1
1 TABLET ORAL EVERY 6 HOURS PRN
Qty: 10 TABLET | Refills: 0 | Status: SHIPPED | OUTPATIENT
Start: 2024-08-05

## 2024-08-05 RX ORDER — NALOXONE HCL 0.4 MG/ML
0.4 VIAL (ML) INJECTION AS NEEDED
Status: DISCONTINUED | OUTPATIENT
Start: 2024-08-05 | End: 2024-08-05 | Stop reason: HOSPADM

## 2024-08-05 RX ORDER — ACETAMINOPHEN 325 MG/1
650 TABLET ORAL ONCE AS NEEDED
Status: DISCONTINUED | OUTPATIENT
Start: 2024-08-05 | End: 2024-08-05 | Stop reason: HOSPADM

## 2024-08-05 RX ORDER — CEPHALEXIN 500 MG/1
500 CAPSULE ORAL 2 TIMES DAILY
Qty: 10 CAPSULE | Refills: 0 | Status: SHIPPED | OUTPATIENT
Start: 2024-08-05 | End: 2024-08-09

## 2024-08-05 RX ORDER — OXYCODONE HYDROCHLORIDE 5 MG/1
10 TABLET ORAL EVERY 4 HOURS PRN
Status: DISCONTINUED | OUTPATIENT
Start: 2024-08-05 | End: 2024-08-05 | Stop reason: HOSPADM

## 2024-08-05 RX ORDER — KETOROLAC TROMETHAMINE 30 MG/ML
INJECTION, SOLUTION INTRAMUSCULAR; INTRAVENOUS AS NEEDED
Status: DISCONTINUED | OUTPATIENT
Start: 2024-08-05 | End: 2024-08-05 | Stop reason: SURG

## 2024-08-05 RX ORDER — LABETALOL HYDROCHLORIDE 5 MG/ML
5 INJECTION, SOLUTION INTRAVENOUS
Status: DISCONTINUED | OUTPATIENT
Start: 2024-08-05 | End: 2024-08-05 | Stop reason: HOSPADM

## 2024-08-05 RX ORDER — HYDRALAZINE HYDROCHLORIDE 20 MG/ML
5 INJECTION INTRAMUSCULAR; INTRAVENOUS
Status: DISCONTINUED | OUTPATIENT
Start: 2024-08-05 | End: 2024-08-05 | Stop reason: HOSPADM

## 2024-08-05 RX ORDER — IPRATROPIUM BROMIDE AND ALBUTEROL SULFATE 2.5; .5 MG/3ML; MG/3ML
3 SOLUTION RESPIRATORY (INHALATION) ONCE AS NEEDED
Status: DISCONTINUED | OUTPATIENT
Start: 2024-08-05 | End: 2024-08-05 | Stop reason: HOSPADM

## 2024-08-05 RX ORDER — MEPERIDINE HYDROCHLORIDE 25 MG/ML
12.5 INJECTION INTRAMUSCULAR; INTRAVENOUS; SUBCUTANEOUS
Status: DISCONTINUED | OUTPATIENT
Start: 2024-08-05 | End: 2024-08-05 | Stop reason: HOSPADM

## 2024-08-05 RX ORDER — SODIUM CHLORIDE, SODIUM LACTATE, POTASSIUM CHLORIDE, CALCIUM CHLORIDE 600; 310; 30; 20 MG/100ML; MG/100ML; MG/100ML; MG/100ML
1000 INJECTION, SOLUTION INTRAVENOUS CONTINUOUS
Status: DISCONTINUED | OUTPATIENT
Start: 2024-08-05 | End: 2024-08-05 | Stop reason: HOSPADM

## 2024-08-05 RX ORDER — DEXAMETHASONE SODIUM PHOSPHATE 4 MG/ML
INJECTION, SOLUTION INTRA-ARTICULAR; INTRALESIONAL; INTRAMUSCULAR; INTRAVENOUS; SOFT TISSUE AS NEEDED
Status: DISCONTINUED | OUTPATIENT
Start: 2024-08-05 | End: 2024-08-05 | Stop reason: SURG

## 2024-08-05 RX ORDER — EPHEDRINE SULFATE 5 MG/ML
5 INJECTION INTRAVENOUS ONCE AS NEEDED
Status: DISCONTINUED | OUTPATIENT
Start: 2024-08-05 | End: 2024-08-05 | Stop reason: HOSPADM

## 2024-08-05 RX ORDER — ONDANSETRON 2 MG/ML
INJECTION INTRAMUSCULAR; INTRAVENOUS AS NEEDED
Status: DISCONTINUED | OUTPATIENT
Start: 2024-08-05 | End: 2024-08-05 | Stop reason: SURG

## 2024-08-05 RX ORDER — DIPHENHYDRAMINE HYDROCHLORIDE 50 MG/ML
12.5 INJECTION INTRAMUSCULAR; INTRAVENOUS ONCE AS NEEDED
Status: DISCONTINUED | OUTPATIENT
Start: 2024-08-05 | End: 2024-08-05 | Stop reason: HOSPADM

## 2024-08-05 RX ORDER — DIPHENHYDRAMINE HYDROCHLORIDE 50 MG/ML
12.5 INJECTION INTRAMUSCULAR; INTRAVENOUS
Status: DISCONTINUED | OUTPATIENT
Start: 2024-08-05 | End: 2024-08-05 | Stop reason: HOSPADM

## 2024-08-05 RX ORDER — LIDOCAINE HYDROCHLORIDE 20 MG/ML
INJECTION, SOLUTION EPIDURAL; INFILTRATION; INTRACAUDAL; PERINEURAL AS NEEDED
Status: DISCONTINUED | OUTPATIENT
Start: 2024-08-05 | End: 2024-08-05 | Stop reason: SURG

## 2024-08-05 RX ORDER — OXYBUTYNIN CHLORIDE 5 MG/1
5 TABLET ORAL 3 TIMES DAILY PRN
Qty: 15 TABLET | Refills: 0 | Status: SHIPPED | OUTPATIENT
Start: 2024-08-05

## 2024-08-05 RX ADMIN — HYDROMORPHONE HYDROCHLORIDE 0.5 MG: 1 INJECTION, SOLUTION INTRAMUSCULAR; INTRAVENOUS; SUBCUTANEOUS at 09:24

## 2024-08-05 RX ADMIN — HYDROMORPHONE HYDROCHLORIDE 0.5 MG: 1 INJECTION, SOLUTION INTRAMUSCULAR; INTRAVENOUS; SUBCUTANEOUS at 09:44

## 2024-08-05 RX ADMIN — HYDROMORPHONE HYDROCHLORIDE 0.5 MG: 1 INJECTION, SOLUTION INTRAMUSCULAR; INTRAVENOUS; SUBCUTANEOUS at 08:49

## 2024-08-05 RX ADMIN — DEXAMETHASONE SODIUM PHOSPHATE 4 MG: 4 INJECTION, SOLUTION INTRAMUSCULAR; INTRAVENOUS at 08:01

## 2024-08-05 RX ADMIN — FENTANYL CITRATE 50 MCG: 50 INJECTION, SOLUTION INTRAMUSCULAR; INTRAVENOUS at 07:49

## 2024-08-05 RX ADMIN — SODIUM CHLORIDE, POTASSIUM CHLORIDE, SODIUM LACTATE AND CALCIUM CHLORIDE 1000 ML: 600; 310; 30; 20 INJECTION, SOLUTION INTRAVENOUS at 06:38

## 2024-08-05 RX ADMIN — SODIUM CHLORIDE 2000 MG: 900 INJECTION INTRAVENOUS at 07:21

## 2024-08-05 RX ADMIN — MEPERIDINE HYDROCHLORIDE 12.5 MG: 25 INJECTION INTRAMUSCULAR; INTRAVENOUS; SUBCUTANEOUS at 08:56

## 2024-08-05 RX ADMIN — LIDOCAINE HYDROCHLORIDE 100 MG: 20 INJECTION, SOLUTION EPIDURAL; INFILTRATION; INTRACAUDAL; PERINEURAL at 07:41

## 2024-08-05 RX ADMIN — ONDANSETRON 4 MG: 2 INJECTION INTRAMUSCULAR; INTRAVENOUS at 08:01

## 2024-08-05 RX ADMIN — KETOROLAC TROMETHAMINE 30 MG: 30 INJECTION, SOLUTION INTRAMUSCULAR at 08:28

## 2024-08-05 RX ADMIN — OXYCODONE HYDROCHLORIDE 10 MG: 5 TABLET ORAL at 09:18

## 2024-08-05 RX ADMIN — FENTANYL CITRATE 50 MCG: 50 INJECTION, SOLUTION INTRAMUSCULAR; INTRAVENOUS at 07:33

## 2024-08-05 RX ADMIN — PROPOFOL 200 MG: 10 INJECTION, EMULSION INTRAVENOUS at 07:41

## 2024-08-05 NOTE — OP NOTE
Urology Operative Note    8/5/2024    Andrea Dumont  49 y.o.  1975  male  [id number]      Surgeon(s) and Role:  Livan Latif MD - Primary     Pre-operative Diagnosis: Right renal and ureteral stones    Post-operative Diagnosis: Same    Complications: None    Procedures:  Cystoscopy  Right ureteroscopy  Laser Lithotripsy of ureteral stones  Laser lithotripsy of renal stones  Stent replacement  Fluoroscopy with Interpretation     Indications   Andrea Dumont is a 49 y.o. male who was found to have obstructing right ureteral stones as well as renal stones.  Distal right and proximal right ureteral stones as well as renal stones and history of polycystic kidney disease.  Underwent stent placement with a small stent due to his narrow ureter now here 2 weeks later for planned intervention    During the informed consent process, the procedure was discussed in detail including the risks of bleeding, infection, damage to surrounding structures and need for staged procedure.      Findings     Fluoroscopy with interpretation: Stent removed and wire placed the upper pole the kidney.  Stent placed with partial curl in the kidney    Description of procedure:  The patient was properly identified in the preoperative holding area and taken to the operating room where general anesthesia was induced. The patient was placed in the cystolithotomy position and prepped and draped in a sterile fashion. The patient was given antibiotics intravenously before the start of the surgery. After ensuring that all of the required equipment was ready and available a surgical timeout was performed.     A 21 Faroese rigid cystoscope was inserted into the bladder under direct visualization.  Stent was grasped and removed.  A Sensor wire was passed up the ureter under fluoroscopic guidance.  Semirigid ureteroscope was passed into the ureter.  The ureter was quite narrow and somewhat tortuous so could not be navigated up beyond the mid  ureter.  The flexible ureteroscope was then advanced over a second wire and stone was encountered in the ureter.  The stone was visualized. The stone was lasered into tiny fragments.   No more stones were seen within the ureter under direct visualization.   Scope was then advanced up into the kidney additional stones were seen in upper pole calyx and laser used to break these up into tiny passable pieces.  Due to his narrow ureter no basketing was done the stones were dusted into tiny pieces.  The cystoscope was then replaced over the wire and a 7 Turkish x 26cm stent was placed under direct and fluoroscopic visualization.  String was left on the stent  The bladder was then emptied and scope removed.    There were no apparent complications. The patient woke up in the operating room and was taken to the recovery room in stable condition.     I was present and scrubbed for the entire procedure.     Specimens: None    Estimated Blood Loss: minimal      Plan   -Follow up with Dr. Latif 3 to 4 days string stent removal  -Follow-up 2 months renal ultrasound and KUB prior         Livan Latif MD  [facility]  [facility] [facility] [facility], [address]  [place] [address] [phone]

## 2024-08-05 NOTE — DISCHARGE INSTRUCTIONS
Plan   -Follow up with Dr. Latif 3 to 4 days string stent removal  -Follow-up 2 months renal ultrasound and KUB prior

## 2024-08-05 NOTE — ANESTHESIA POSTPROCEDURE EVALUATION
Patient: Andrea Dumont    Procedure Summary       Date: 08/05/24 Room / Location: [facility] OR 06 / [facility] MAIN OR    Anesthesia Start: 0733 Anesthesia Stop: 0839    Procedure: CYSTOSCOPY URETEROSCOPY  LASER LITHOTRIPSY, , STENT EXCHANGE (Right) Diagnosis:       Calculus, renal      (Calculus, renal [N20.0])    Surgeons: Livan Latif MD Provider: Jd Parham MD    Anesthesia Type: general ASA Status: 2            Anesthesia Type: general    Vitals  Vitals Value Taken Time   /82 08/05/24 0957   Temp 98 °F (36.7 °C) 08/05/24 0957   Pulse 78 08/05/24 1000   Resp 14 08/05/24 0957   SpO2 96 % 08/05/24 1000   Vitals shown include unfiled device data.        Post Anesthesia Care and Evaluation    Patient location during evaluation: PACU  Patient participation: complete - patient participated  Level of consciousness: awake  Pain scale: See nurse's notes for pain score.  Pain management: adequate    Airway patency: patent  Anesthetic complications: No anesthetic complications  PONV Status: none  Cardiovascular status: acceptable  Respiratory status: acceptable and spontaneous ventilation  Hydration status: acceptable    Comments: Patient seen and examined postoperatively; vital signs stable; SpO2 greater than or equal to 90%; cardiopulmonary status stable; nausea/vomiting adequately controlled; pain adequately controlled; no apparent anesthesia complications; patient discharged from anesthesia care when discharge criteria were met

## 2024-08-05 NOTE — ANESTHESIA PROCEDURE NOTES
Airway  Urgency: elective    Date/Time: 8/5/2024 7:43 AM  End Time:8/5/2024 7:43 AM  Airway not difficult    General Information and Staff    Patient location during procedure: OR  CRNA/CAA: Matteo Morocho CRNA    Indications and Patient Condition  Indications for airway management: airway protection    Preoxygenated: yes  MILS maintained throughout  Mask difficulty assessment: 1 - vent by mask    Final Airway Details  Final airway type: supraglottic airway      Successful airway: I-gel  Size 5  Airway Seal Pressure (cm H2O): 20     Number of attempts at approach: 1  Assessment: lips, teeth, and gum same as pre-op and atraumatic intubation

## 2024-08-07 NOTE — PROGRESS NOTES
"Chief Complaint  Chief Complaint   Patient presents with    ADHD        Subjective          Andrea Dumont is a 49-year-old male who reports to the office today for follow-up on ADHD.    ADHD- He is doing ok on the Vyvanse. He reports it felt like it was wearing off real quick. He would like to go up in dosage.     Kidney stones- Had 3 stones. Tried lithotripsy and put in a small stent. Then they put in a big stent. He was seen by Dr. Latif. They stones were busted up on Monday. He had the stent removed yesterday.          Review of Systems   Constitutional:  Negative for chills and fever.   HENT:  Negative for congestion.    Respiratory:  Negative for shortness of breath.    Cardiovascular:  Negative for chest pain.   Gastrointestinal:  Positive for abdominal pain. Negative for nausea and vomiting.   Genitourinary:  Positive for flank pain. Negative for difficulty urinating.   Musculoskeletal:  Negative for myalgias.   Skin: Negative.    Neurological:  Negative for dizziness, light-headedness and headache.   Psychiatric/Behavioral:  Positive for decreased concentration. Negative for self-injury, suicidal ideas and depressed mood. The patient is not nervous/anxious.         Objective   Vital Signs:   Vitals:    08/09/24 0851   BP: 131/98   Pulse: 90   Temp: 97.3 °F (36.3 °C)   SpO2: 96%      Estimated body mass index is 33.21 kg/m² as calculated from the following:    Height as of this encounter: 180.3 cm (70.98\").    Weight as of this encounter: 108 kg (238 lb).                          Physical Exam  Vitals reviewed.   Constitutional:       Appearance: Normal appearance. He is obese.   HENT:      Head: Normocephalic and atraumatic.      Nose: Nose normal.      Mouth/Throat:      Mouth: Mucous membranes are moist.      Pharynx: Oropharynx is clear.   Eyes:      Extraocular Movements: Extraocular movements intact.      Conjunctiva/sclera: Conjunctivae normal.   Cardiovascular:      Rate and Rhythm: Normal rate " and regular rhythm.      Pulses: Normal pulses.      Heart sounds: Normal heart sounds.      Comments: S1, S2 audible  Pulmonary:      Effort: Pulmonary effort is normal.      Breath sounds: Normal breath sounds.      Comments: On room air   Abdominal:      General: Abdomen is flat.      Tenderness: There is right CVA tenderness.   Musculoskeletal:         General: Normal range of motion.      Cervical back: Normal range of motion.   Skin:     General: Skin is warm and dry.   Neurological:      General: No focal deficit present.      Mental Status: He is alert and oriented to person, place, and time. Mental status is at baseline.   Psychiatric:         Mood and Affect: Mood normal.         Behavior: Behavior normal.         Thought Content: Thought content normal.         Judgment: Judgment normal.                Physical Exam   Result Review :             Procedures       Assessment and Plan     Diagnoses and all orders for this visit:    1. Kidney stone (Primary)  Assessment & Plan:  Kidney stones- Had 3 stones. Tried lithotripsy and put in a small stent. Then they put in a big stent. He was seen by Dr. Latif. They stones were busted up on Monday. He had the stent removed yesterday.     Could be secondary to tums - He reports he was taking a lot of tums previously      2. Attention deficit hyperactivity disorder (ADHD), unspecified ADHD type  Assessment & Plan:  ADHD- He is doing ok on the Vyvanse. He reports it felt like it was wearing off real quick. He would like to go up in dosage.     Increased Vyvanse to 20mg daily   UDS ordered  Controlled substance agreement signed     Orders:  -     Lisdexamfetamine Dimesylate 20 MG chewable tablet; Chew 1 tablet Daily Chew and swallow tablet once a day  Dispense: 30 tablet; Refill: 0  -     POC Urine Drug Screen, Triage              Follow Up   Return in about 4 months (around 12/9/2024) for Annual physical.   Patient was given instructions and counseling regarding her  condition or for health maintenance advice. Please see specific information pulled into the AVS if appropriate.

## 2024-08-09 ENCOUNTER — OFFICE VISIT (OUTPATIENT)
Dept: FAMILY MEDICINE CLINIC | Facility: CLINIC | Age: 49
End: 2024-08-09
Payer: COMMERCIAL

## 2024-08-09 VITALS
TEMPERATURE: 97.3 F | HEART RATE: 90 BPM | OXYGEN SATURATION: 96 % | HEIGHT: 71 IN | SYSTOLIC BLOOD PRESSURE: 131 MMHG | WEIGHT: 238 LBS | BODY MASS INDEX: 33.32 KG/M2 | DIASTOLIC BLOOD PRESSURE: 98 MMHG

## 2024-08-09 DIAGNOSIS — F90.9 ATTENTION DEFICIT HYPERACTIVITY DISORDER (ADHD), UNSPECIFIED ADHD TYPE: ICD-10-CM

## 2024-08-09 DIAGNOSIS — N20.0 KIDNEY STONE: Primary | ICD-10-CM

## 2024-08-09 LAB
POC AMPHETAMINES: POSITIVE
POC BARBITURATES: NEGATIVE
POC BENZODIAZEPHINES: NEGATIVE
POC COCAINE: NEGATIVE
POC METHADONE: NEGATIVE
POC METHAMPHETAMINE SCREEN URINE: NEGATIVE
POC OPIATES: NEGATIVE
POC OXYCODONE: NEGATIVE
POC PHENCYCLIDINE: NEGATIVE
POC PROPOXYPHENE: NEGATIVE
POC THC: NEGATIVE
POC TRICYCLIC ANTIDEPRESSANTS: NEGATIVE

## 2024-08-09 PROCEDURE — 99214 OFFICE O/P EST MOD 30 MIN: CPT | Performed by: NURSE PRACTITIONER

## 2024-08-09 PROCEDURE — 80305 DRUG TEST PRSMV DIR OPT OBS: CPT | Performed by: NURSE PRACTITIONER

## 2024-08-09 RX ORDER — LISDEXAMFETAMINE DIMESYLATE 20 MG/1
20 TABLET, CHEWABLE ORAL DAILY
Qty: 30 TABLET | Refills: 0 | Status: SHIPPED | OUTPATIENT
Start: 2024-08-09

## 2024-08-09 NOTE — ASSESSMENT & PLAN NOTE
ADHD- He is doing ok on the Vyvanse. He reports it felt like it was wearing off real quick. He would like to go up in dosage.     Increased Vyvanse to 20mg daily   UDS ordered  Controlled substance agreement signed

## 2024-08-09 NOTE — ASSESSMENT & PLAN NOTE
Kidney stones- Had 3 stones. Tried lithotripsy and put in a small stent. Then they put in a big stent. He was seen by Dr. Latif. They stones were busted up on Monday. He had the stent removed yesterday.     Could be secondary to tums - He reports he was taking a lot of tums previously

## 2024-08-13 ENCOUNTER — PREP FOR SURGERY (OUTPATIENT)
Dept: OTHER | Facility: HOSPITAL | Age: 49
End: 2024-08-13
Payer: COMMERCIAL

## 2024-08-13 DIAGNOSIS — Z12.11 ENCOUNTER FOR SCREENING COLONOSCOPY: Primary | ICD-10-CM

## 2024-08-13 RX ORDER — SODIUM, POTASSIUM,MAG SULFATES 17.5-3.13G
SOLUTION, RECONSTITUTED, ORAL ORAL
Qty: 177 ML | Refills: 0 | Status: SHIPPED | OUTPATIENT
Start: 2024-08-13

## 2024-08-13 RX ORDER — SODIUM CHLORIDE, SODIUM LACTATE, POTASSIUM CHLORIDE, CALCIUM CHLORIDE 600; 310; 30; 20 MG/100ML; MG/100ML; MG/100ML; MG/100ML
30 INJECTION, SOLUTION INTRAVENOUS CONTINUOUS
OUTPATIENT
Start: 2024-08-13

## 2024-09-09 RX ORDER — TADALAFIL 10 MG/1
10 TABLET ORAL DAILY
Qty: 30 TABLET | Refills: 0 | Status: SHIPPED | OUTPATIENT
Start: 2024-09-09

## 2024-09-11 DIAGNOSIS — F90.9 ATTENTION DEFICIT HYPERACTIVITY DISORDER (ADHD), UNSPECIFIED ADHD TYPE: ICD-10-CM

## 2024-09-11 RX ORDER — LISDEXAMFETAMINE DIMESYLATE 20 MG/1
1 TABLET, CHEWABLE ORAL DAILY
Qty: 30 TABLET | Refills: 0 | Status: SHIPPED | OUTPATIENT
Start: 2024-09-11

## 2024-09-11 NOTE — TELEPHONE ENCOUNTER
[facility]    Rx Refill Note  Requested Prescriptions     Pending Prescriptions Disp Refills    Lisdexamfetamine Dimesylate 20 MG chewable tablet [Pharmacy Med Name: Lisdexamfetamine Dimesylate Oral Tablet Chewable 20 MG] 30 tablet 0     Sig: CHEW AND SWALLOW 1 TABLET BY MOUTH EVERY DAY      Last office visit with prescribing clinician: 8/9/2024   Last telemedicine visit with prescribing clinician: Visit date not found   Next office visit with prescribing clinician: 12/13/2024                         Would you like a call back once the refill request has been completed: [] Yes [] No    If the office needs to give you a call back, can they leave a voicemail: [] Yes [] No    Mague Myles, RT  09/11/24, 14:16 EDT

## 2024-09-16 DIAGNOSIS — N20.1 URETEROLITHIASIS: ICD-10-CM

## 2024-09-16 RX ORDER — HYDROCODONE BITARTRATE AND ACETAMINOPHEN 10; 325 MG/1; MG/1
1 TABLET ORAL EVERY 6 HOURS PRN
Qty: 10 TABLET | Refills: 0 | Status: CANCELLED | OUTPATIENT
Start: 2024-09-16

## 2024-09-17 DIAGNOSIS — G89.4 CHRONIC PAIN SYNDROME: Primary | ICD-10-CM

## 2024-09-17 RX ORDER — HYDROCODONE BITARTRATE AND ACETAMINOPHEN 7.5; 325 MG/1; MG/1
1 TABLET ORAL 4 TIMES DAILY PRN
Qty: 120 TABLET | Refills: 0 | Status: SHIPPED | OUTPATIENT
Start: 2024-09-21

## 2024-10-09 DIAGNOSIS — F90.9 ATTENTION DEFICIT HYPERACTIVITY DISORDER (ADHD), UNSPECIFIED ADHD TYPE: ICD-10-CM

## 2024-10-09 RX ORDER — LISDEXAMFETAMINE DIMESYLATE 20 MG/1
1 TABLET, CHEWABLE ORAL DAILY
Qty: 30 TABLET | Refills: 0 | Status: SHIPPED | OUTPATIENT
Start: 2024-10-09

## 2024-10-09 RX ORDER — TADALAFIL 10 MG/1
10 TABLET ORAL DAILY
Qty: 30 TABLET | Refills: 0 | Status: SHIPPED | OUTPATIENT
Start: 2024-10-09

## 2024-10-09 NOTE — TELEPHONE ENCOUNTER
[facility]    Rx Refill Note  Requested Prescriptions     Pending Prescriptions Disp Refills    Lisdexamfetamine Dimesylate 20 MG chewable tablet [Pharmacy Med Name: Lisdexamfetamine Dimesylate Oral Tablet Chewable 20 MG] 30 tablet 0     Sig: CHEW AND SWALLOW 1 TABLET BY MOUTH EVERY DAY    tadalafil (CIALIS) 10 MG tablet [Pharmacy Med Name: Tadalafil Oral Tablet 10 MG] 30 tablet 0     Sig: TAKE 1 TABLET BY MOUTH EVERY DAY      Last office visit with prescribing clinician: 8/9/2024   Last telemedicine visit with prescribing clinician: Visit date not found   Next office visit with prescribing clinician: 12/13/2024                         Would you like a call back once the refill request has been completed: [] Yes [] No    If the office needs to give you a call back, can they leave a voicemail: [] Yes [] No    Mague Myles, RT  10/09/24, 10:47 EDT

## 2024-10-17 ENCOUNTER — OFFICE VISIT (OUTPATIENT)
Dept: PAIN MEDICINE | Facility: CLINIC | Age: 49
End: 2024-10-17
Payer: COMMERCIAL

## 2024-10-17 VITALS
SYSTOLIC BLOOD PRESSURE: 136 MMHG | HEART RATE: 84 BPM | OXYGEN SATURATION: 95 % | DIASTOLIC BLOOD PRESSURE: 86 MMHG | WEIGHT: 239 LBS | RESPIRATION RATE: 16 BRPM | BODY MASS INDEX: 33.35 KG/M2

## 2024-10-17 DIAGNOSIS — G89.4 CHRONIC PAIN SYNDROME: ICD-10-CM

## 2024-10-17 DIAGNOSIS — M79.2 NEUROPATHIC PAIN: ICD-10-CM

## 2024-10-17 DIAGNOSIS — M79.18 MYOFASCIAL PAIN SYNDROME: ICD-10-CM

## 2024-10-17 DIAGNOSIS — Z79.899 HIGH RISK MEDICATION USE: Primary | ICD-10-CM

## 2024-10-17 DIAGNOSIS — G35 MULTIPLE SCLEROSIS: ICD-10-CM

## 2024-10-17 RX ORDER — HYDROCODONE BITARTRATE AND ACETAMINOPHEN 7.5; 325 MG/1; MG/1
1 TABLET ORAL 4 TIMES DAILY PRN
Qty: 120 TABLET | Refills: 0 | Status: SHIPPED | OUTPATIENT
Start: 2024-10-21

## 2024-10-17 RX ORDER — HYDROCODONE BITARTRATE AND ACETAMINOPHEN 7.5; 325 MG/1; MG/1
1 TABLET ORAL 4 TIMES DAILY PRN
Qty: 120 TABLET | Refills: 0 | Status: SHIPPED | OUTPATIENT
Start: 2024-11-20

## 2024-10-17 NOTE — PROGRESS NOTES
Subjective    CC bilateral feet and leg pain.  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  Continued chronic bilateral feet and lower leg neuropathic pain and myofascial pain constant but worse with activity.  Denies new weakness saddle anesthesia bladder bowel continence.  Chronic axial back pain without radicular pain.  Chronic polyarthralgia.  EMG/NCS bilateral lower extremity within normal limit.  Sees neurology for MS  Pain interfering with daily activity work and sleep.  Tried gabapentin did not tolerate.  Currently utilizes Lyrica but can only tolerate once a day 75 mg.  Prescribed by PCP.  Cannot take NSAIDs due to polycystic kidney disease    C-spine MRI  No abnormal cervical spinal cord signal or enhancement is seen. Mild degenerative changes at C3-C4 causing stable mild to moderate bilateral neural foraminal narrowing at this level. No significant spinal canal stenosis.  L-spine and T-spine MRI 2022 Probable old demyelinating plaque in the posterior thoracic cord at the T8 level with no abnormal cervical or thoracic cord enhancement to suggest active demyelination. No new demyelinating plaque is identified. At C3-C4, left greater than right uncinate process hypertrophy results in mild to moderate left neural foraminal narrowing.  Otherwise, no significant spinal canal stenosis or neural foraminal narrowing.    Pain Assessment   Location of Pain:  ana Leg, joint, back  Description of Pain: Dull/Aching, Throbbing, Stabbing  Previous Pain Rating :5  Current Pain Ratin  Aggravating Factors: Activity  Alleviating Factors: Rest, Medication    PEG Assessment   What number best describes your pain on average in the past week?3  What number best describes how, during the past week, pain has interfered with your enjoyment of life?4  What number best describes how, during the past week, pain has interfered with your general  activity? 7    The following portions of the patient's history were reviewed and updated as appropriate: allergies, current medications, past family history, past medical history, past social history, past surgical history and problem list.    Review of Systems   Musculoskeletal:  Positive for arthralgias, back pain and myalgias.   All other systems reviewed and are negative.    Objective   Physical Exam   Constitutional: No distress.   Pulmonary/Chest: Effort normal.   Neurological: A sensory deficit is present.   Vitals reviewed.    PHQ 9 on chart  Opioid risk tool moderate risk (age)    Assessment & Plan    Diagnoses and all orders for this visit:    1. Chronic pain syndrome (Primary)  -     HYDROcodone-acetaminophen (NORCO) 7.5-325 MG per tablet; Take 1 tablet by mouth 4 (Four) Times a Day As Needed for Severe Pain.  Dispense: 120 tablet; Refill: 0  -     HYDROcodone-acetaminophen (NORCO) 7.5-325 MG per tablet; Take 1 tablet by mouth 4 (Four) Times a Day As Needed for Severe Pain. DNF before 11/20/2024  Dispense: 120 tablet; Refill: 0    2. Multiple sclerosis    3. Neuropathic pain    4. Myofascial pain syndrome    5. High risk medication use    Summary  Andrea Dumont is a 49 y.o. male with multiple sclerosis, peripheral neuropathy here for f/u.   Chronic bilateral lower extremity neuropathic pain/myofascial pain related to MS.   Cannot take NSAIDs due to polycystic kidney disease.  EMG/NCS bilateral lower extremity within normal limit.  Keeps follow up with neurology.    Stable the last 2 to 3 months.  Denies any new issues or concerns.  Continues to have good relief and control benefit with hydrocodone she denies any side effects.    Continue hydrocodone 7.5/325 4 daily as needed for severe pain.  UDS and Inspect reviewed.   Discussed risk of tolerance, dependence, respiratory depression, coma and death associated with use of oral opioids for treatment of chronic nonmalignant pain.     RTC 2-3  month

## 2024-11-09 DIAGNOSIS — F90.9 ATTENTION DEFICIT HYPERACTIVITY DISORDER (ADHD), UNSPECIFIED ADHD TYPE: ICD-10-CM

## 2024-11-11 RX ORDER — LISDEXAMFETAMINE DIMESYLATE 20 MG/1
1 TABLET, CHEWABLE ORAL DAILY
Qty: 30 TABLET | Refills: 0 | Status: SHIPPED | OUTPATIENT
Start: 2024-11-11

## 2024-11-11 NOTE — TELEPHONE ENCOUNTER
[facility]    Rx Refill Note  Requested Prescriptions     Pending Prescriptions Disp Refills    Lisdexamfetamine Dimesylate 20 MG chewable tablet [Pharmacy Med Name: Lisdexamfetamine Dimesylate Oral Tablet Chewable 20 MG] 30 tablet 0     Sig: CHEW AND SWALLOW 1 TABLET BY MOUTH EVERY DAY      Last office visit with prescribing clinician: 8/9/2024   Last telemedicine visit with prescribing clinician: Visit date not found   Next office visit with prescribing clinician: 12/13/2024                         Would you like a call back once the refill request has been completed: [] Yes [] No    If the office needs to give you a call back, can they leave a voicemail: [] Yes [] No    Mague Myles, RT  11/11/24, 10:02 EST

## 2024-12-06 DIAGNOSIS — F90.9 ATTENTION DEFICIT HYPERACTIVITY DISORDER (ADHD), UNSPECIFIED ADHD TYPE: ICD-10-CM

## 2024-12-06 RX ORDER — TADALAFIL 10 MG/1
10 TABLET ORAL DAILY
Qty: 30 TABLET | Refills: 0 | Status: SHIPPED | OUTPATIENT
Start: 2024-12-06

## 2024-12-06 RX ORDER — LISDEXAMFETAMINE DIMESYLATE 20 MG/1
TABLET, CHEWABLE ORAL
Qty: 30 TABLET | Refills: 0 | Status: SHIPPED | OUTPATIENT
Start: 2024-12-06

## 2024-12-06 NOTE — TELEPHONE ENCOUNTER
[facility]    Rx Refill Note  Requested Prescriptions     Pending Prescriptions Disp Refills    Lisdexamfetamine Dimesylate 20 MG chewable tablet [Pharmacy Med Name: Lisdexamfetamine Dimesylate Oral Tablet Chewable 20 MG] 30 tablet 0     Sig: CHEW AND SWALLOW 1 TABLET BY MOUTH IN THE MORNING    tadalafil (CIALIS) 10 MG tablet [Pharmacy Med Name: Tadalafil Oral Tablet 10 MG] 30 tablet 0     Sig: TAKE 1 TABLET BY MOUTH EVERY DAY      Last office visit with prescribing clinician: 8/9/2024   Last telemedicine visit with prescribing clinician: Visit date not found   Next office visit with prescribing clinician: 12/13/2024                         Would you like a call back once the refill request has been completed: [] Yes [] No    If the office needs to give you a call back, can they leave a voicemail: [] Yes [] No    Mague Myles, RT  12/06/24, 12:13 EST

## 2024-12-10 NOTE — PROGRESS NOTES
Chief Complaint  Chief Complaint   Patient presents with    Annual Exam    referral to dermatologist        Subjective          Andrea Dumont is a 49-year-old male who reports to the office today for annual physical.    Annual physical-Denies any new family history. Family history: Mother- MS, Lewy body dementia. Both sides of family- HTN. Paternal side- PCKD.    Social history: Denies smoking, drinking, or illegal drugs      Multiple sclerosis- He reports he was diagnosed in 2005 and gets flare ups that last a while. He will get feet pain and weakness.  He sees pain management at [facility]. He is on Norco.      HTN-Denies CP, SOA, dizziness, lightheadedness, or HA. He complaint on medication. He checks his BP at home and has been good.      Polycystic kidney disease- He does not see nephrologist. He reports he gets annual MRI's every year. His levels have been ok.      Degenerative issues with shoulder- He got scopes and they are fine now. He saw Dr. Bain.      ADD- He was diagnosed in 2015. He is on Vyanse and would like to go up in dosage.      Obesity- He has not been exercising. He has not been eating healthy.      Moles- He goes to dermatology for skin issues and would like to get his skin checked.      Depression- On wellbutrin. He sees therapist. Denies SI or HI.            Labs-Due  Colonoscopy-Due  PSA- Due      Vaccines:  Flu-Refused- History of Emani [place]   Covid-19-Refused     Dental exam-Due   Eye exam-Due         Review of Systems   Constitutional:  Negative for chills and fever.   HENT:  Negative for congestion.    Eyes: Negative.    Respiratory:  Negative for shortness of breath.    Cardiovascular:  Negative for chest pain.   Gastrointestinal:  Negative for abdominal pain, nausea and vomiting.   Genitourinary:  Negative for difficulty urinating.   Musculoskeletal:  Negative for myalgias.   Skin: Negative.    Neurological:  Negative for dizziness, light-headedness and headache.  "  Psychiatric/Behavioral:  Negative for self-injury, suicidal ideas and depressed mood. The patient is not nervous/anxious.         Objective   Vital Signs:   Vitals:    12/13/24 1544   BP: (!) 147/101   Pulse:    Temp:    SpO2:       Estimated body mass index is 33.91 kg/m² as calculated from the following:    Height as of this encounter: 180.3 cm (70.98\").    Weight as of this encounter: 110 kg (243 lb).                  Patient screened negative for depression based on a PHQ-9 score of 0  on 12/13/2024 . Follow-up recommendations include: PCP managing depression.        Physical Exam  Vitals reviewed.   Constitutional:       Appearance: Normal appearance. He is obese.   HENT:      Head: Normocephalic and atraumatic.      Right Ear: Tympanic membrane, ear canal and external ear normal.      Left Ear: Tympanic membrane, ear canal and external ear normal.      Nose: Nose normal.      Mouth/Throat:      Mouth: Mucous membranes are moist.      Pharynx: Oropharynx is clear.   Eyes:      Extraocular Movements: Extraocular movements intact.      Conjunctiva/sclera: Conjunctivae normal.      Pupils: Pupils are equal, round, and reactive to light.   Cardiovascular:      Rate and Rhythm: Normal rate and regular rhythm.      Pulses: Normal pulses.      Heart sounds: Normal heart sounds.      Comments: S1, S2 audible  Pulmonary:      Effort: Pulmonary effort is normal.      Breath sounds: Normal breath sounds.      Comments: On room air   Abdominal:      General: Abdomen is flat. Bowel sounds are normal.      Palpations: Abdomen is soft.      Tenderness: There is abdominal tenderness.      Comments: LLQ tenderness   Musculoskeletal:         General: Normal range of motion.      Cervical back: Normal range of motion and neck supple.   Skin:     General: Skin is warm and dry.      Comments: Tattoo noted on arm    Neurological:      General: No focal deficit present.      Mental Status: He is alert and oriented to person, place, " and time. Mental status is at baseline.   Psychiatric:         Mood and Affect: Mood normal.         Behavior: Behavior normal.         Thought Content: Thought content normal.         Judgment: Judgment normal.                Physical Exam   Result Review :             Procedures       Assessment and Plan     Diagnoses and all orders for this visit:    1. Secondary hypertension (Primary)  Assessment & Plan:  BP: 147/88    On lisinopril-hydrochlorothiazide-monitor kidney function - Increased lisinopril to 20mg daily     HTN-Denies CP, SOA, dizziness, lightheadedness, or HA. He complaint on medication. He checks his BP at home and has been good.     Keep BP log- Check BP 1-2 hours after taking medication   Bring BP monitor to next appointment   Encourage low sodium diet   Check BP if symptomatic- Chest pain, shortness of breath, dizziness, lightheadedness, heart palpitations, or headache        2. Adult polycystic kidney disease  Assessment & Plan:     Polycystic kidney disease- He does not see nephrologist. He reports he gets annual MRI's every year. His levels have been ok.     Check CMP      3. Colon cancer screening  -     Ambulatory Referral to Gastroenterology    4. Numerous moles  Assessment & Plan:  Moles- He goes to dermatology for skin issues and would like to get his skin checked.     Orders:  -     Ambulatory Referral to Dermatology    5. Annual physical exam  Assessment & Plan:  Annual physical-Denies any new family history. Family history: Mother- MS, Lewy body dementia. Both sides of family- HTN. Paternal side- PCKD. Social history: Denies smoking, drinking, or illegal drugs     Labs-Due  Colonoscopy-Due  PSA- Due      Vaccines:  Flu-Refused- History of Emani [place]   Covid-19-Refused     Dental exam-Due   Eye exam-Due    Encourage healthy diet and exercise   Encourage monthly self testicular exams   Check labs  Encourage dental and eye exam  Referral to [facility] for colonoscopy     Orders:  -      Comprehensive Metabolic Panel  -     Hemoglobin A1c  -     Lipid Panel  -     Testosterone; Future    6. Attention deficit hyperactivity disorder (ADHD), unspecified ADHD type  Assessment & Plan:  On Vyvanse   Last urine drug screen reviewed   Controlled substance agreement up-to-date     ADD- He was diagnosed in 2015. He is on Vyanse and would like to go up in dosage.     Orders:  -     lisdexamfetamine (Vyvanse) 30 MG capsule; Take 1 capsule by mouth Every Morning  Dispense: 30 capsule; Refill: 0    7. Vitamin D deficiency  Assessment & Plan:  Check vitamin D level    Orders:  -     Vitamin D,25-Hydroxy    8. Screening PSA (prostate specific antigen)  -     PSA Screen    Other orders  -     lisinopril-hydrochlorothiazide (PRINZIDE,ZESTORETIC) 20-12.5 MG per tablet; Take 1 tablet by mouth Daily.  Dispense: 30 tablet; Refill: 0              Follow Up   Return for ADD, Recheck.   Patient was given instructions and counseling regarding her condition or for health maintenance advice. Please see specific information pulled into the AVS if appropriate.

## 2024-12-11 PROBLEM — Z76.89 ENCOUNTER TO ESTABLISH CARE: Status: RESOLVED | Noted: 2024-05-10 | Resolved: 2024-12-11

## 2024-12-11 NOTE — ASSESSMENT & PLAN NOTE
BP: 147/88    On lisinopril-hydrochlorothiazide-monitor kidney function - Increased lisinopril to 20mg daily     HTN-Denies CP, SOA, dizziness, lightheadedness, or HA. He complaint on medication. He checks his BP at home and has been good.     Keep BP log- Check BP 1-2 hours after taking medication   Bring BP monitor to next appointment   Encourage low sodium diet   Check BP if symptomatic- Chest pain, shortness of breath, dizziness, lightheadedness, heart palpitations, or headache

## 2024-12-11 NOTE — ASSESSMENT & PLAN NOTE
On Vyvanse   Last urine drug screen reviewed   Controlled substance agreement up-to-date     ADD- He was diagnosed in 2015. He is on Vyanse and would like to go up in dosage.

## 2024-12-13 ENCOUNTER — OFFICE VISIT (OUTPATIENT)
Dept: FAMILY MEDICINE CLINIC | Facility: CLINIC | Age: 49
End: 2024-12-13
Payer: COMMERCIAL

## 2024-12-13 ENCOUNTER — PRIOR AUTHORIZATION (OUTPATIENT)
Dept: FAMILY MEDICINE CLINIC | Facility: CLINIC | Age: 49
End: 2024-12-13

## 2024-12-13 VITALS
DIASTOLIC BLOOD PRESSURE: 101 MMHG | OXYGEN SATURATION: 97 % | HEIGHT: 71 IN | BODY MASS INDEX: 34.02 KG/M2 | WEIGHT: 243 LBS | SYSTOLIC BLOOD PRESSURE: 147 MMHG | TEMPERATURE: 98 F | HEART RATE: 87 BPM

## 2024-12-13 DIAGNOSIS — Z12.11 COLON CANCER SCREENING: ICD-10-CM

## 2024-12-13 DIAGNOSIS — I15.9 SECONDARY HYPERTENSION: Primary | ICD-10-CM

## 2024-12-13 DIAGNOSIS — Z12.5 SCREENING PSA (PROSTATE SPECIFIC ANTIGEN): ICD-10-CM

## 2024-12-13 DIAGNOSIS — D22.9 NUMEROUS MOLES: ICD-10-CM

## 2024-12-13 DIAGNOSIS — F90.9 ATTENTION DEFICIT HYPERACTIVITY DISORDER (ADHD), UNSPECIFIED ADHD TYPE: ICD-10-CM

## 2024-12-13 DIAGNOSIS — Q61.2 ADULT POLYCYSTIC KIDNEY DISEASE: ICD-10-CM

## 2024-12-13 DIAGNOSIS — Z00.00 ANNUAL PHYSICAL EXAM: ICD-10-CM

## 2024-12-13 DIAGNOSIS — E55.9 VITAMIN D DEFICIENCY: ICD-10-CM

## 2024-12-13 PROCEDURE — 99213 OFFICE O/P EST LOW 20 MIN: CPT | Performed by: NURSE PRACTITIONER

## 2024-12-13 PROCEDURE — 80061 LIPID PANEL: CPT | Performed by: NURSE PRACTITIONER

## 2024-12-13 PROCEDURE — 83036 HEMOGLOBIN GLYCOSYLATED A1C: CPT | Performed by: NURSE PRACTITIONER

## 2024-12-13 PROCEDURE — G0103 PSA SCREENING: HCPCS | Performed by: NURSE PRACTITIONER

## 2024-12-13 PROCEDURE — 80053 COMPREHEN METABOLIC PANEL: CPT | Performed by: NURSE PRACTITIONER

## 2024-12-13 PROCEDURE — 99396 PREV VISIT EST AGE 40-64: CPT | Performed by: NURSE PRACTITIONER

## 2024-12-13 PROCEDURE — 82306 VITAMIN D 25 HYDROXY: CPT | Performed by: NURSE PRACTITIONER

## 2024-12-13 RX ORDER — LISINOPRIL AND HYDROCHLOROTHIAZIDE 12.5; 2 MG/1; MG/1
1 TABLET ORAL DAILY
Qty: 30 TABLET | Refills: 0 | Status: SHIPPED | OUTPATIENT
Start: 2024-12-13

## 2024-12-13 RX ORDER — LISDEXAMFETAMINE DIMESYLATE 30 MG/1
30 CAPSULE ORAL EVERY MORNING
Qty: 30 CAPSULE | Refills: 0 | Status: SHIPPED | OUTPATIENT
Start: 2024-12-13

## 2024-12-13 NOTE — TELEPHONE ENCOUNTER
PA SUBMITTED FOR Lisdexamfetamine Dimesylate 30MG capsules    WAITING OUTCOME FROM INSURANCE    (Key: [id number])  PA Case ID #: [id number]  Rx #: [id number]

## 2024-12-13 NOTE — PATIENT INSTRUCTIONS
Encourage healthy diet and exercise   Encourage monthly self testicular exams   Check labs  Encourage dental and eye exam  Referral to [facility] for colonoscopy   Referral to dermatology - Dr. Frost   Increased Vyvanse to 30mg   Keep BP log- Check BP 1-2 hours after taking medication   Bring BP monitor to next appointment   Encourage low sodium diet   Check BP if symptomatic- Chest pain, shortness of breath, dizziness, lightheadedness, heart palpitations, or headache    Increased lisinopril to 20mg

## 2024-12-13 NOTE — ASSESSMENT & PLAN NOTE
Polycystic kidney disease- He does not see nephrologist. He reports he gets annual MRI's every year. His levels have been ok.     Check CMP

## 2024-12-13 NOTE — PROGRESS NOTES
Venipuncture performed in right arm with good hemostasis. Patient tolerated well. Sarah SCOTT MA

## 2024-12-13 NOTE — ASSESSMENT & PLAN NOTE
Annual physical-Denies any new family history. Family history: Mother- MS, Lewy body dementia. Both sides of family- HTN. Paternal side- PCKD. Social history: Denies smoking, drinking, or illegal drugs     Labs-Due  Colonoscopy-Due  PSA- Due      Vaccines:  Flu-Refused- History of Emani [place]   Covid-19-Refused     Dental exam-Due   Eye exam-Due    Encourage healthy diet and exercise   Encourage monthly self testicular exams   Check labs  Encourage dental and eye exam  Referral to [facility] for colonoscopy

## 2024-12-14 LAB
25(OH)D3 SERPL-MCNC: 16.9 NG/ML (ref 30–100)
ALBUMIN SERPL-MCNC: 4.5 G/DL (ref 3.5–5.2)
ALBUMIN/GLOB SERPL: 1.2 G/DL
ALP SERPL-CCNC: 103 U/L (ref 39–117)
ALT SERPL W P-5'-P-CCNC: 25 U/L (ref 1–41)
ANION GAP SERPL CALCULATED.3IONS-SCNC: 10.8 MMOL/L (ref 5–15)
AST SERPL-CCNC: 20 U/L (ref 1–40)
BILIRUB SERPL-MCNC: 0.4 MG/DL (ref 0–1.2)
BUN SERPL-MCNC: 14 MG/DL (ref 6–20)
BUN/CREAT SERPL: 15.4 (ref 7–25)
CALCIUM SPEC-SCNC: 9.7 MG/DL (ref 8.6–10.5)
CHLORIDE SERPL-SCNC: 98 MMOL/L (ref 98–107)
CHOLEST SERPL-MCNC: 204 MG/DL (ref 0–200)
CO2 SERPL-SCNC: 26.2 MMOL/L (ref 22–29)
CREAT SERPL-MCNC: 0.91 MG/DL (ref 0.76–1.27)
EGFRCR SERPLBLD CKD-EPI 2021: 104.6 ML/MIN/1.73
GLOBULIN UR ELPH-MCNC: 3.8 GM/DL
GLUCOSE SERPL-MCNC: 104 MG/DL (ref 65–99)
HBA1C MFR BLD: 6.3 % (ref 4.8–5.6)
HDLC SERPL-MCNC: 37 MG/DL (ref 40–60)
LDLC SERPL CALC-MCNC: 119 MG/DL (ref 0–100)
LDLC/HDLC SERPL: 3.04 {RATIO}
POTASSIUM SERPL-SCNC: 3.9 MMOL/L (ref 3.5–5.2)
PROT SERPL-MCNC: 8.3 G/DL (ref 6–8.5)
PSA SERPL-MCNC: 0.33 NG/ML (ref 0–4)
SODIUM SERPL-SCNC: 135 MMOL/L (ref 136–145)
TRIGL SERPL-MCNC: 272 MG/DL (ref 0–150)
VLDLC SERPL-MCNC: 48 MG/DL (ref 5–40)

## 2024-12-16 PROBLEM — R73.03 PRE-DIABETES: Status: ACTIVE | Noted: 2024-12-16

## 2024-12-16 RX ORDER — ERGOCALCIFEROL 1.25 MG/1
50000 CAPSULE, LIQUID FILLED ORAL WEEKLY
Qty: 52 CAPSULE | Refills: 0 | Status: SHIPPED | OUTPATIENT
Start: 2024-12-16

## 2024-12-18 DIAGNOSIS — G89.4 CHRONIC PAIN SYNDROME: ICD-10-CM

## 2024-12-18 RX ORDER — HYDROCODONE BITARTRATE AND ACETAMINOPHEN 7.5; 325 MG/1; MG/1
1 TABLET ORAL 4 TIMES DAILY PRN
Qty: 120 TABLET | Refills: 0 | Status: CANCELLED | OUTPATIENT
Start: 2024-12-18

## 2024-12-18 NOTE — TELEPHONE ENCOUNTER
Caller: EUSEBIOCLIFFORD CANER    Relationship: PATIENT    Best call back number: [phone] -CAN LEAVE  MSG    Requested Prescriptions:   Requested Prescriptions     Pending Prescriptions Disp Refills    HYDROcodone-acetaminophen (NORCO) 7.5-325 MG per tablet 120 tablet 0     Sig: Take 1 tablet by mouth 4 (Four) Times a Day As Needed for Severe Pain.        Pharmacy where request should be sent:  [facility] [phone]    Last office visit with prescribing clinician: 10/17/2024   Last telemedicine visit with prescribing clinician: Visit date not found   Next office visit with prescribing clinician: 2/13/2025     Additional details provided by patient:  PATIENT HAS #5 TABS LEFT. PATIENT HAD F/U APPT. 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 RESCHEDULED APPT. IS NOT UNTIL 2/13/24. CAN PATIENT BE SEEN SOONER OR PATIENT ASKED IF DR CASPER CAN DO A TELEHEALTH VISIT?    Does the patient have less than a 3 day supply:  [x] Yes  [] No    Would you like a call back once the refill request has been completed: [] Yes [] No    If the office needs to give you a call back, can they leave a voicemail: [] Yes [] No    Gaby Jang   12/18/24 13:48 EST

## 2024-12-19 ENCOUNTER — OFFICE VISIT (OUTPATIENT)
Dept: PAIN MEDICINE | Facility: CLINIC | Age: 49
End: 2024-12-19
Payer: COMMERCIAL

## 2024-12-19 VITALS
RESPIRATION RATE: 16 BRPM | HEART RATE: 83 BPM | OXYGEN SATURATION: 97 % | BODY MASS INDEX: 33.35 KG/M2 | SYSTOLIC BLOOD PRESSURE: 129 MMHG | DIASTOLIC BLOOD PRESSURE: 78 MMHG | WEIGHT: 239 LBS

## 2024-12-19 DIAGNOSIS — M79.18 MYOFASCIAL PAIN SYNDROME: ICD-10-CM

## 2024-12-19 DIAGNOSIS — M79.2 NEUROPATHIC PAIN: ICD-10-CM

## 2024-12-19 DIAGNOSIS — G89.4 CHRONIC PAIN SYNDROME: Primary | ICD-10-CM

## 2024-12-19 DIAGNOSIS — G35 MULTIPLE SCLEROSIS: ICD-10-CM

## 2024-12-19 DIAGNOSIS — Z79.899 HIGH RISK MEDICATION USE: ICD-10-CM

## 2024-12-19 RX ORDER — HYDROCODONE BITARTRATE AND ACETAMINOPHEN 7.5; 325 MG/1; MG/1
1 TABLET ORAL 4 TIMES DAILY PRN
Qty: 120 TABLET | Refills: 0 | Status: SHIPPED | OUTPATIENT
Start: 2025-01-18

## 2024-12-19 RX ORDER — HYDROCODONE BITARTRATE AND ACETAMINOPHEN 7.5; 325 MG/1; MG/1
1 TABLET ORAL 4 TIMES DAILY PRN
Qty: 120 TABLET | Refills: 0 | Status: SHIPPED | OUTPATIENT
Start: 2024-12-19

## 2024-12-19 NOTE — PROGRESS NOTES
Subjective    CC bilateral feet and leg pain.  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  Continued chronic bilateral feet and lower leg neuropathic pain and myofascial pain constant but worse with activity.  Denies new weakness saddle anesthesia bladder bowel continence.  Chronic axial back pain without radicular pain.  Chronic polyarthralgia.  EMG/NCS bilateral lower extremity within normal limit.  Sees neurology for MS  Pain interfering with daily activity work and sleep.  Tried gabapentin did not tolerate.  Currently utilizes Lyrica but can only tolerate once a day 75 mg.  Prescribed by PCP.  Cannot take NSAIDs due to polycystic kidney disease    C-spine MRI  No abnormal cervical spinal cord signal or enhancement is seen. Mild degenerative changes at C3-C4 causing stable mild to moderate bilateral neural foraminal narrowing at this level. No significant spinal canal stenosis.  L-spine and T-spine MRI 2022 Probable old demyelinating plaque in the posterior thoracic cord at the T8 level with no abnormal cervical or thoracic cord enhancement to suggest active demyelination. No new demyelinating plaque is identified. At C3-C4, left greater than right uncinate process hypertrophy results in mild to moderate left neural foraminal narrowing.  Otherwise, no significant spinal canal stenosis or neural foraminal narrowing.    Pain Assessment   Location of Pain:  ana Leg, joint, back  Description of Pain: Dull/Aching, Throbbing, Stabbing  Previous Pain Rating :4  Current Pain Ratin  Aggravating Factors: Activity  Alleviating Factors: Rest, Medication    PEG Assessment   What number best describes your pain on average in the past week?3  What number best describes how, during the past week, pain has interfered with your enjoyment of life?4  What number best describes how, during the past week, pain has interfered with your general  activity? 7    The following portions of the patient's history were reviewed and updated as appropriate: allergies, current medications, past family history, past medical history, past social history, past surgical history and problem list.    Review of Systems   Musculoskeletal:  Positive for arthralgias, back pain and myalgias.   All other systems reviewed and are negative.    Objective   Physical Exam   Constitutional: No distress.   Pulmonary/Chest: Effort normal.   Neurological: A sensory deficit is present.   Vitals reviewed.    PHQ 9 on chart  Opioid risk tool moderate risk (age)    Assessment & Plan    Diagnoses and all orders for this visit:    1. Chronic pain syndrome (Primary)  -     HYDROcodone-acetaminophen (NORCO) 7.5-325 MG per tablet; Take 1 tablet by mouth 4 (Four) Times a Day As Needed for Severe Pain.  Dispense: 120 tablet; Refill: 0  -     HYDROcodone-acetaminophen (NORCO) 7.5-325 MG per tablet; Take 1 tablet by mouth 4 (Four) Times a Day As Needed for Severe Pain. DNF before 1/18/2025  Dispense: 120 tablet; Refill: 0    2. Multiple sclerosis    3. Neuropathic pain    4. Myofascial pain syndrome    5. High risk medication use    Summary  Andrea Dumont is a 49 y.o. male with multiple sclerosis, peripheral neuropathy here for f/u.   Chronic bilateral lower extremity neuropathic pain/myofascial pain related to MS.   Cannot take NSAIDs due to polycystic kidney disease.  EMG/NCS bilateral lower extremity within normal limit.  Keeps follow up with neurology.    Continues to have good relief with hydrocodone and denies any side effects.  Denies any new issues or complaints today.    Continue hydrocodone 7.5/325 4 daily as needed for severe pain.  UDS and Inspect reviewed.   Discussed risk of tolerance, dependence, respiratory depression, coma and death associated with use of oral opioids for treatment of chronic nonmalignant pain.     RTC 2-3  month

## 2025-01-07 RX ORDER — LISINOPRIL AND HYDROCHLOROTHIAZIDE 12.5; 2 MG/1; MG/1
1 TABLET ORAL DAILY
Qty: 90 TABLET | Refills: 1 | Status: SHIPPED | OUTPATIENT
Start: 2025-01-07

## 2025-01-10 DIAGNOSIS — F90.9 ATTENTION DEFICIT HYPERACTIVITY DISORDER (ADHD), UNSPECIFIED ADHD TYPE: ICD-10-CM

## 2025-01-10 RX ORDER — LISDEXAMFETAMINE DIMESYLATE 30 MG/1
30 CAPSULE ORAL EVERY MORNING
Qty: 30 CAPSULE | Refills: 0 | Status: SHIPPED | OUTPATIENT
Start: 2025-01-10 | End: 2025-01-13

## 2025-01-10 RX ORDER — LISDEXAMFETAMINE DIMESYLATE 20 MG/1
TABLET, CHEWABLE ORAL
Qty: 30 TABLET | Refills: 0 | OUTPATIENT
Start: 2025-01-10

## 2025-01-10 NOTE — TELEPHONE ENCOUNTER
INSPECT R[facility]    Rx Refill Note  Requested Prescriptions     Pending Prescriptions Disp Refills    lisdexamfetamine (Vyvanse) 30 MG capsule 30 capsule 0     Sig: Take 1 capsule by mouth Every Morning     Refused Prescriptions Disp Refills    Lisdexamfetamine Dimesylate 20 MG chewable tablet [Pharmacy Med Name: Lisdexamfetamine Dimesylate Oral Tablet Chewable 20 MG] 30 tablet 0     Sig: CHEW AND SWALLOW 1 TABLET BY MOUTH EVERY DAY IN THE MORNING      Last office visit with prescribing clinician: 12/13/2024   Last telemedicine visit with prescribing clinician: Visit date not found   Next office visit with prescribing clinician: 3/14/2025                         Would you like a call back once the refill request has been completed: [] Yes [] No    If the office needs to give you a call back, can they leave a voicemail: [] Yes [] No    Mague Myles, RT  01/10/25, 11:15 EST

## 2025-01-13 DIAGNOSIS — F90.9 ATTENTION DEFICIT HYPERACTIVITY DISORDER (ADHD), UNSPECIFIED ADHD TYPE: Primary | ICD-10-CM

## 2025-01-13 RX ORDER — LISDEXAMFETAMINE DIMESYLATE 30 MG/1
30 TABLET, CHEWABLE ORAL DAILY
Qty: 30 TABLET | Refills: 0 | Status: SHIPPED | OUTPATIENT
Start: 2025-01-13

## 2025-01-22 ENCOUNTER — TELEPHONE (OUTPATIENT)
Dept: FAMILY MEDICINE CLINIC | Facility: CLINIC | Age: 50
End: 2025-01-22

## 2025-01-22 NOTE — TELEPHONE ENCOUNTER
Caller: Andrea Dumont    Relationship: Self    Best call back number: [phone]    What medication are you requesting: OZEMPIC    What are your current symptoms: OBESITY     How long have you been experiencing symptoms:     Have you had these symptoms before:    [x] Yes  [] No    Have you been treated for these symptoms before:   [x] Yes  [] No    If a prescription is needed, what is your preferred pharmacy and phone number: [id number] [address] [facility] [phone] [facility] [phone]      Additional notes:WOULD LIKE TO TRY TO GET OZEMPIC AS DISCUSSED

## 2025-01-23 ENCOUNTER — TELEPHONE (OUTPATIENT)
Dept: FAMILY MEDICINE CLINIC | Facility: CLINIC | Age: 50
End: 2025-01-23
Payer: COMMERCIAL

## 2025-01-23 NOTE — TELEPHONE ENCOUNTER
HUB TO RELAY      Brittani Birmingham, INDERJIT      Please call patient and let him know that recently you have been seen and insurance companies deny these medications.      Please tell him to start exercising and eating healthy and at the next visit we will discuss this I am able to chart that he has been doing those 2 things as insurance usually does not approve it if the patient is not exercising and eating healthy.    LEFT MESSAGE AND SENT [employer] MESSAGE

## 2025-02-10 ENCOUNTER — TELEPHONE (OUTPATIENT)
Dept: FAMILY MEDICINE CLINIC | Facility: CLINIC | Age: 50
End: 2025-02-10

## 2025-02-11 NOTE — PROGRESS NOTES
"Chief Complaint  Chief Complaint   Patient presents with    ADHD    Obesity        Subjective          Andrea Dumont is a 49-year-old male who reports to the office today for follow-up on ADHD.    ADHD- He was told the chewable vyvanse would not be kept in stock at his pharmacy. He reports it cost $50 a month. Denies SI or HI. He reports he has tried adderal in the past and would like to go back on this.     Obesity- He reports he is active, but does not have set scheduled exercise. He reports he has started to eat better. He is going to weight watchers. He has lost 5 pounds so far. He was inquiring about Wegovy. He has never tried weight loss medication.          Review of Systems   Constitutional:  Negative for chills and fever.   HENT:  Negative for congestion.    Respiratory:  Negative for shortness of breath.    Cardiovascular:  Negative for chest pain.   Gastrointestinal:  Negative for abdominal pain, nausea and vomiting.   Genitourinary:  Negative for difficulty urinating.   Musculoskeletal:  Negative for myalgias.   Skin: Negative.    Neurological:  Negative for dizziness, light-headedness and headache.   Psychiatric/Behavioral:  Negative for depressed mood. The patient is not nervous/anxious.         Objective   Vital Signs:   Vitals:    02/14/25 1238   BP: 115/79   Pulse: 74   Resp: 18   Temp: 97.3 °F (36.3 °C)   SpO2: 99%      Estimated body mass index is 34.02 kg/m² as calculated from the following:    Height as of this encounter: 180.3 cm (70.98\").    Weight as of this encounter: 111 kg (243 lb 12.8 oz).                          Physical Exam  Vitals reviewed.   Constitutional:       Appearance: Normal appearance. He is obese.   HENT:      Head: Normocephalic and atraumatic.      Nose: Nose normal.      Mouth/Throat:      Mouth: Mucous membranes are moist.      Pharynx: Oropharynx is clear.   Eyes:      Extraocular Movements: Extraocular movements intact.      Conjunctiva/sclera: Conjunctivae " normal.      Comments: Wears glasses   Cardiovascular:      Rate and Rhythm: Normal rate and regular rhythm.      Pulses: Normal pulses.      Heart sounds: Normal heart sounds.      Comments: S1, S2 audible  Pulmonary:      Effort: Pulmonary effort is normal.      Breath sounds: Normal breath sounds.      Comments: On room air   Abdominal:      General: Abdomen is flat.   Musculoskeletal:         General: Normal range of motion.      Cervical back: Normal range of motion.   Skin:     General: Skin is warm and dry.   Neurological:      General: No focal deficit present.      Mental Status: He is alert and oriented to person, place, and time. Mental status is at baseline.   Psychiatric:         Mood and Affect: Mood normal.         Behavior: Behavior normal.         Thought Content: Thought content normal.         Judgment: Judgment normal.                Physical Exam   Result Review :             Procedures       Assessment and Plan     Diagnoses and all orders for this visit:    1. Attention deficit hyperactivity disorder (ADHD), unspecified ADHD type (Primary)  Assessment & Plan:  ADHD- He was told the chewable vyvanse would not be kept in stock at his pharmacy. He reports it cost $50 a month. Denies SI or HI. He reports he has tried adderal in the past and would like to go back on this.     Discontinue Vyvanse - He reports he has been out for 4-5 days  Prescribed adderrall 10mg daily     Orders:  -     amphetamine-dextroamphetamine (Adderall) 10 MG tablet; Take 1 tablet by mouth Daily.  Dispense: 30 tablet; Refill: 0    2. Obesity (BMI 30-39.9)  Assessment & Plan:  Patient's (Body mass index is 34.02 kg/m².)       Obesity- He reports he is active, but does not have set scheduled exercise. He reports he has started to eat better. He is going to weight watchers. He has lost 5 pounds so far. He was inquiring about Wegovy. He has never tried weight loss medication.     Prescribed Wegovy 2.5mg weekly   Encouraged  nutritional counseling, calorie restriction, exercise, behavior modification  Goal weight: 200  Current weight: 243    Estimated end date of therapy:  pending therapy       Other orders  -     Semaglutide-Weight Management (Wegovy) 0.25 MG/0.5ML solution auto-injector; Inject 0.5 mL under the skin into the appropriate area as directed 1 (One) Time Per Week.  Dispense: 2 mL; Refill: 0              Follow Up   Return in about 3 months (around 5/14/2025) for ADHD, weight, Recheck.   Patient was given instructions and counseling regarding her condition or for health maintenance advice. Please see specific information pulled into the AVS if appropriate.

## 2025-02-13 ENCOUNTER — PATIENT MESSAGE (OUTPATIENT)
Dept: FAMILY MEDICINE CLINIC | Facility: CLINIC | Age: 50
End: 2025-02-13

## 2025-02-14 ENCOUNTER — OFFICE VISIT (OUTPATIENT)
Dept: FAMILY MEDICINE CLINIC | Facility: CLINIC | Age: 50
End: 2025-02-14
Payer: COMMERCIAL

## 2025-02-14 VITALS
DIASTOLIC BLOOD PRESSURE: 79 MMHG | BODY MASS INDEX: 34.13 KG/M2 | SYSTOLIC BLOOD PRESSURE: 115 MMHG | HEART RATE: 74 BPM | WEIGHT: 243.8 LBS | OXYGEN SATURATION: 99 % | TEMPERATURE: 97.3 F | RESPIRATION RATE: 18 BRPM | HEIGHT: 71 IN

## 2025-02-14 DIAGNOSIS — F90.9 ATTENTION DEFICIT HYPERACTIVITY DISORDER (ADHD), UNSPECIFIED ADHD TYPE: Primary | ICD-10-CM

## 2025-02-14 DIAGNOSIS — E66.9 OBESITY (BMI 30-39.9): ICD-10-CM

## 2025-02-14 PROCEDURE — 99214 OFFICE O/P EST MOD 30 MIN: CPT | Performed by: NURSE PRACTITIONER

## 2025-02-14 RX ORDER — SEMAGLUTIDE 0.25 MG/.5ML
0.25 INJECTION, SOLUTION SUBCUTANEOUS WEEKLY
Qty: 2 ML | Refills: 0 | Status: SHIPPED | OUTPATIENT
Start: 2025-02-14

## 2025-02-14 RX ORDER — DEXTROAMPHETAMINE SACCHARATE, AMPHETAMINE ASPARTATE, DEXTROAMPHETAMINE SULFATE AND AMPHETAMINE SULFATE 2.5; 2.5; 2.5; 2.5 MG/1; MG/1; MG/1; MG/1
10 TABLET ORAL DAILY
Qty: 30 TABLET | Refills: 0 | Status: SHIPPED | OUTPATIENT
Start: 2025-02-14

## 2025-02-14 NOTE — ASSESSMENT & PLAN NOTE
ADHD- He was told the chewable vyvanse would not be kept in stock at his pharmacy. He reports it cost $50 a month. Denies SI or HI. He reports he has tried adderal in the past and would like to go back on this.     Discontinue Vyvanse - He reports he has been out for 4-5 days  Prescribed adderrall 10mg daily

## 2025-02-14 NOTE — ASSESSMENT & PLAN NOTE
Patient's (Body mass index is 34.02 kg/m².)       Obesity- He reports he is active, but does not have set scheduled exercise. He reports he has started to eat better. He is going to weight watchers. He has lost 5 pounds so far. He was inquiring about Wegovy. He has never tried weight loss medication.     Prescribed Wegovy 2.5mg weekly   Encouraged nutritional counseling, calorie restriction, exercise, behavior modification  Goal weight: 200  Current weight: 243    Estimated end date of therapy:  pending therapy

## 2025-02-20 ENCOUNTER — OFFICE VISIT (OUTPATIENT)
Dept: PAIN MEDICINE | Facility: CLINIC | Age: 50
End: 2025-02-20
Payer: COMMERCIAL

## 2025-02-20 VITALS
HEART RATE: 88 BPM | SYSTOLIC BLOOD PRESSURE: 138 MMHG | RESPIRATION RATE: 16 BRPM | BODY MASS INDEX: 33.49 KG/M2 | OXYGEN SATURATION: 98 % | WEIGHT: 240 LBS | DIASTOLIC BLOOD PRESSURE: 89 MMHG

## 2025-02-20 DIAGNOSIS — M79.2 NEUROPATHIC PAIN: ICD-10-CM

## 2025-02-20 DIAGNOSIS — G89.4 CHRONIC PAIN SYNDROME: ICD-10-CM

## 2025-02-20 DIAGNOSIS — Z79.899 HIGH RISK MEDICATION USE: Primary | ICD-10-CM

## 2025-02-20 DIAGNOSIS — G35 MULTIPLE SCLEROSIS: ICD-10-CM

## 2025-02-20 DIAGNOSIS — M79.18 MYOFASCIAL PAIN SYNDROME: ICD-10-CM

## 2025-02-20 RX ORDER — HYDROCODONE BITARTRATE AND ACETAMINOPHEN 7.5; 325 MG/1; MG/1
1 TABLET ORAL 4 TIMES DAILY PRN
Qty: 120 TABLET | Refills: 0 | Status: SHIPPED | OUTPATIENT
Start: 2025-02-20

## 2025-02-20 RX ORDER — HYDROCODONE BITARTRATE AND ACETAMINOPHEN 7.5; 325 MG/1; MG/1
1 TABLET ORAL 4 TIMES DAILY PRN
Qty: 120 TABLET | Refills: 0 | Status: SHIPPED | OUTPATIENT
Start: 2025-03-20

## 2025-02-20 NOTE — PROGRESS NOTES
Subjective    CC bilateral feet and leg pain.  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  Denies any new issues.  Taking an assignment in  [place] for 10 3 months.  Continues to have good relief and functional benefits without side effects.  Chronic bilateral feet and lower leg neuropathic pain and myofascial pain constant but worse with activity.  Denies new weakness saddle anesthesia bladder bowel continence.  Chronic axial back pain without radicular pain.  Chronic polyarthralgia.  EMG/NCS bilateral lower extremity within normal limit.  Sees neurology for MS  Pain interfering with daily activity work and sleep.  Tried gabapentin did not tolerate.  Currently utilizes Lyrica but can only tolerate once a day 75 mg.  Prescribed by PCP.  Cannot take NSAIDs due to polycystic kidney disease    C-spine MRI 2023 No abnormal cervical spinal cord signal or enhancement is seen. Mild degenerative changes at C3-C4 causing stable mild to moderate bilateral neural foraminal narrowing at this level. No significant spinal canal stenosis.  L-spine and T-spine MRI 5/2022 Probable old demyelinating plaque in the posterior thoracic cord at the T8 level with no abnormal cervical or thoracic cord enhancement to suggest active demyelination. No new demyelinating plaque is identified. At C3-C4, left greater than right uncinate process hypertrophy results in mild to moderate left neural foraminal narrowing.  Otherwise, no significant spinal canal stenosis or neural foraminal narrowing.    Pain Assessment   Location of Pain:  ana Leg, joint, back  Description of Pain: Dull/Aching, Throbbing, Stabbing  Previous Pain Rating :2  Current Pain Rating: 3  Aggravating Factors: Activity  Alleviating Factors: Rest, Medication    PEG Assessment   What number best describes your pain on average in the past week?3  What number best describes how, during the past week,  pain has interfered with your enjoyment of life?2  What number best describes how, during the past week, pain has interfered with your general activity? 7    The following portions of the patient's history were reviewed and updated as appropriate: allergies, current medications, past family history, past medical history, past social history, past surgical history and problem list.    Review of Systems   Musculoskeletal:  Positive for arthralgias, back pain and myalgias.   All other systems reviewed and are negative.    Objective   Physical Exam   Constitutional: No distress.   Pulmonary/Chest: Effort normal.   Neurological: A sensory deficit is present.   Vitals reviewed.    PHQ 9 on chart  Opioid risk tool moderate risk (age)    Assessment & Plan    Diagnoses and all orders for this visit:    1. Chronic pain syndrome (Primary)  -     HYDROcodone-acetaminophen (NORCO) 7.5-325 MG per tablet; Take 1 tablet by mouth 4 (Four) Times a Day As Needed for Severe Pain.  Dispense: 120 tablet; Refill: 0  -     HYDROcodone-acetaminophen (NORCO) 7.5-325 MG per tablet; Take 1 tablet by mouth 4 (Four) Times a Day As Needed for Severe Pain. DNF before 3/20/2025  Dispense: 120 tablet; Refill: 0    2. Multiple sclerosis    3. Neuropathic pain    4. Myofascial pain syndrome    5. High risk medication use    Summary  Andrea Dumont is a 49 y.o. male with multiple sclerosis, peripheral neuropathy here for f/u.   Chronic bilateral lower extremity neuropathic pain/myofascial pain related to MS.   Cannot take NSAIDs due to polycystic kidney disease.  EMG/NCS bilateral lower extremity within normal limit.  Keeps follow up with neurology.    Denies any new issues.  Taking an assignment in  [place] for 10 3 months.  Continues to have good relief and functional benefits without side effects.    Continue hydrocodone 7.5/325 4 daily as needed for severe pain.  UDS and Inspect reviewed.   Discussed risk of tolerance, dependence,  respiratory depression, coma and death associated with use of oral opioids for treatment of chronic nonmalignant pain.     RTC 2-3  month

## 2025-03-03 RX ORDER — TADALAFIL 10 MG/1
10 TABLET ORAL DAILY
Qty: 30 TABLET | Refills: 1 | Status: SHIPPED | OUTPATIENT
Start: 2025-03-03

## 2025-03-10 DIAGNOSIS — F90.9 ATTENTION DEFICIT HYPERACTIVITY DISORDER (ADHD), UNSPECIFIED ADHD TYPE: ICD-10-CM

## 2025-03-10 RX ORDER — DEXTROAMPHETAMINE SACCHARATE, AMPHETAMINE ASPARTATE, DEXTROAMPHETAMINE SULFATE AND AMPHETAMINE SULFATE 2.5; 2.5; 2.5; 2.5 MG/1; MG/1; MG/1; MG/1
1 TABLET ORAL DAILY
Qty: 30 TABLET | Refills: 0 | Status: SHIPPED | OUTPATIENT
Start: 2025-03-10

## 2025-03-10 NOTE — TELEPHONE ENCOUNTER
INSPECT RAN    Rx Refill Note  Requested Prescriptions     Pending Prescriptions Disp Refills    amphetamine-dextroamphetamine (ADDERALL) 10 MG tablet [Pharmacy Med Name: Amphetamine-Dextroamphetamine Oral Tablet 10 MG] 30 tablet 0     Sig: TAKE 1 TABLET BY MOUTH EVERY DAY      Last office visit with prescribing clinician: 2/14/2025   Last telemedicine visit with prescribing clinician: Visit date not found   Next office visit with prescribing clinician: 5/16/2025                         Would you like a call back once the refill request has been completed: [] Yes [] No    If the office needs to give you a call back, can they leave a voicemail: [] Yes [] No    Mague Myles, RT  03/10/25, 11:16 EDT

## 2025-03-24 RX ORDER — BUPROPION HYDROCHLORIDE 300 MG/1
300 TABLET ORAL DAILY
Qty: 90 TABLET | Refills: 0 | Status: SHIPPED | OUTPATIENT
Start: 2025-03-24

## 2025-03-24 NOTE — TELEPHONE ENCOUNTER
Rx Refill Note  Requested Prescriptions     Pending Prescriptions Disp Refills    buPROPion XL (WELLBUTRIN XL) 300 MG 24 hr tablet [Pharmacy Med Name: buPROPion HCl ER (XL) Oral Tablet Extended Release 24 Hour 300 MG] 90 tablet 0     Sig: Take 1 tablet by mouth Daily.      Last office visit with prescribing clinician: 2/14/2025   Last telemedicine visit with prescribing clinician: Visit date not found   Next office visit with prescribing clinician: 5/16/2025                         Would you like a call back once the refill request has been completed: [] Yes [] No    If the office needs to give you a call back, can they leave a voicemail: [] Yes [] No    Liliam Morelos CMA  03/24/25, 15:52 EDT

## 2025-04-10 NOTE — TELEPHONE ENCOUNTER
LVM regarding referral to gastroenterology. Inquiring if has been scheduled or has been seen, Gave phone number to [facility] at [phone] to call and schedule      Relay

## 2025-04-11 DIAGNOSIS — F90.9 ATTENTION DEFICIT HYPERACTIVITY DISORDER (ADHD), UNSPECIFIED ADHD TYPE: ICD-10-CM

## 2025-04-11 RX ORDER — DEXTROAMPHETAMINE SACCHARATE, AMPHETAMINE ASPARTATE, DEXTROAMPHETAMINE SULFATE AND AMPHETAMINE SULFATE 2.5; 2.5; 2.5; 2.5 MG/1; MG/1; MG/1; MG/1
1 TABLET ORAL DAILY
Qty: 30 TABLET | Refills: 0 | Status: SHIPPED | OUTPATIENT
Start: 2025-04-11

## 2025-04-11 RX ORDER — SEMAGLUTIDE 0.25 MG/.5ML
0.25 INJECTION, SOLUTION SUBCUTANEOUS WEEKLY
Qty: 2 ML | Refills: 0 | Status: SHIPPED | OUTPATIENT
Start: 2025-04-11

## 2025-04-11 NOTE — TELEPHONE ENCOUNTER
INSPECT RAN    Rx Refill Note  Requested Prescriptions     Pending Prescriptions Disp Refills    amphetamine-dextroamphetamine (ADDERALL) 10 MG tablet [Pharmacy Med Name: Amphetamine-Dextroamphetamine Oral Tablet 10 MG] 30 tablet 0     Sig: TAKE 1 TABLET BY MOUTH EVERY DAY      Last office visit with prescribing clinician: 2/14/2025   Last telemedicine visit with prescribing clinician: Visit date not found   Next office visit with prescribing clinician: 5/16/2025                         Would you like a call back once the refill request has been completed: [] Yes [] No    If the office needs to give you a call back, can they leave a voicemail: [] Yes [] No    Mague Myles, RT  04/11/25, 14:56 EDT

## 2025-04-17 ENCOUNTER — OFFICE VISIT (OUTPATIENT)
Dept: PAIN MEDICINE | Facility: CLINIC | Age: 50
End: 2025-04-17
Payer: COMMERCIAL

## 2025-04-17 VITALS
SYSTOLIC BLOOD PRESSURE: 134 MMHG | BODY MASS INDEX: 32.38 KG/M2 | HEART RATE: 85 BPM | OXYGEN SATURATION: 96 % | RESPIRATION RATE: 16 BRPM | DIASTOLIC BLOOD PRESSURE: 88 MMHG | WEIGHT: 232 LBS

## 2025-04-17 DIAGNOSIS — G35 MULTIPLE SCLEROSIS: ICD-10-CM

## 2025-04-17 DIAGNOSIS — M79.18 MYOFASCIAL PAIN SYNDROME: ICD-10-CM

## 2025-04-17 DIAGNOSIS — Z79.899 HIGH RISK MEDICATION USE: ICD-10-CM

## 2025-04-17 DIAGNOSIS — G89.4 CHRONIC PAIN SYNDROME: Primary | ICD-10-CM

## 2025-04-17 DIAGNOSIS — M79.2 NEUROPATHIC PAIN: ICD-10-CM

## 2025-04-17 RX ORDER — HYDROCODONE BITARTRATE AND ACETAMINOPHEN 7.5; 325 MG/1; MG/1
1 TABLET ORAL 4 TIMES DAILY PRN
Qty: 120 TABLET | Refills: 0 | Status: SHIPPED | OUTPATIENT
Start: 2025-04-19

## 2025-04-17 RX ORDER — HYDROCODONE BITARTRATE AND ACETAMINOPHEN 7.5; 325 MG/1; MG/1
1 TABLET ORAL 4 TIMES DAILY PRN
Qty: 120 TABLET | Refills: 0 | Status: SHIPPED | OUTPATIENT
Start: 2025-05-19

## 2025-04-17 NOTE — PROGRESS NOTES
Subjective    CC bilateral feet and leg pain.  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  Continued chronic bilateral feet and lower leg neuropathic pain and myofascial pain constant but worse with activity.  Denies new weakness saddle anesthesia bladder bowel continence.  Chronic axial back pain without radicular pain.  Chronic polyarthralgia.  EMG/NCS bilateral lower extremity within normal limit.  Sees neurology for MS  Pain interfering with daily activity work and sleep.  Tried gabapentin did not tolerate.  Currently utilizes Lyrica but can only tolerate once a day 75 mg.  Prescribed by PCP.  Cannot take NSAIDs due to polycystic kidney disease    C-spine MRI  No abnormal cervical spinal cord signal or enhancement is seen. Mild degenerative changes at C3-C4 causing stable mild to moderate bilateral neural foraminal narrowing at this level. No significant spinal canal stenosis.  L-spine and T-spine MRI 2022 Probable old demyelinating plaque in the posterior thoracic cord at the T8 level with no abnormal cervical or thoracic cord enhancement to suggest active demyelination. No new demyelinating plaque is identified. At C3-C4, left greater than right uncinate process hypertrophy results in mild to moderate left neural foraminal narrowing.  Otherwise, no significant spinal canal stenosis or neural foraminal narrowing.    Pain Assessment   Location of Pain:  ana Leg, joint, back  Description of Pain: Dull/Aching, Throbbing, Stabbing  Previous Pain Rating :3  Current Pain Ratin  Aggravating Factors: Activity  Alleviating Factors: Rest, Medication    PEG Assessment   What number best describes your pain on average in the past week?3  What number best describes how, during the past week, pain has interfered with your enjoyment of life?2  What number best describes how, during the past week, pain has interfered with your general  activity? 7    The following portions of the patient's history were reviewed and updated as appropriate: allergies, current medications, past family history, past medical history, past social history, past surgical history and problem list.    Review of Systems   Musculoskeletal:  Positive for arthralgias, back pain and myalgias.   All other systems reviewed and are negative.    Objective   Physical Exam   Constitutional: No distress.   Pulmonary/Chest: Effort normal.   Neurological: A sensory deficit is present.   Vitals reviewed.    PHQ 9 on chart  Opioid risk tool moderate risk (age)    Assessment & Plan    Diagnoses and all orders for this visit:    1. Chronic pain syndrome (Primary)    2. Multiple sclerosis  -     HYDROcodone-acetaminophen (NORCO) 7.5-325 MG per tablet; Take 1 tablet by mouth 4 (Four) Times a Day As Needed for Severe Pain.  Dispense: 120 tablet; Refill: 0  -     HYDROcodone-acetaminophen (NORCO) 7.5-325 MG per tablet; Take 1 tablet by mouth 4 (Four) Times a Day As Needed for Severe Pain. DNF before 5/19/2025  Dispense: 120 tablet; Refill: 0    3. Neuropathic pain  -     HYDROcodone-acetaminophen (NORCO) 7.5-325 MG per tablet; Take 1 tablet by mouth 4 (Four) Times a Day As Needed for Severe Pain.  Dispense: 120 tablet; Refill: 0  -     HYDROcodone-acetaminophen (NORCO) 7.5-325 MG per tablet; Take 1 tablet by mouth 4 (Four) Times a Day As Needed for Severe Pain. DNF before 5/19/2025  Dispense: 120 tablet; Refill: 0    4. Myofascial pain syndrome    5. High risk medication use    Summary  Andrea Dumont is a 50 y.o. male with multiple sclerosis, peripheral neuropathy here for f/u.   Chronic bilateral lower extremity neuropathic pain/myofascial pain related to MS.   Cannot take NSAIDs due to polycystic kidney disease.  EMG/NCS bilateral lower extremity within normal limit.  Keeps follow up with neurology.    Denies any new issues today.  Working on weight loss.    Continue hydrocodone 7.5/325 4  daily as needed for severe pain.  UDS and Inspect reviewed.   Discussed risk of tolerance, dependence, respiratory depression, coma and death associated with use of oral opioids for treatment of chronic nonmalignant pain.     RTC 2-3  month

## 2025-05-08 DIAGNOSIS — F90.9 ATTENTION DEFICIT HYPERACTIVITY DISORDER (ADHD), UNSPECIFIED ADHD TYPE: ICD-10-CM

## 2025-05-08 NOTE — TELEPHONE ENCOUNTER
INSPECT RAN    Rx Refill Note  Requested Prescriptions     Pending Prescriptions Disp Refills    amphetamine-dextroamphetamine (ADDERALL) 10 MG tablet [Pharmacy Med Name: Amphetamine-Dextroamphetamine Oral Tablet 10 MG] 30 tablet 0     Sig: TAKE 1 TABLET BY MOUTH EVERY DAY      Last office visit with prescribing clinician: 2/14/2025   Last telemedicine visit with prescribing clinician: Visit date not found   Next office visit with prescribing clinician: 5/16/2025                         Would you like a call back once the refill request has been completed: [] Yes [] No    If the office needs to give you a call back, can they leave a voicemail: [] Yes [] No    Mague Myles, RT  05/08/25, 13:06 EDT

## 2025-05-09 RX ORDER — DEXTROAMPHETAMINE SACCHARATE, AMPHETAMINE ASPARTATE, DEXTROAMPHETAMINE SULFATE AND AMPHETAMINE SULFATE 2.5; 2.5; 2.5; 2.5 MG/1; MG/1; MG/1; MG/1
1 TABLET ORAL DAILY
Qty: 30 TABLET | Refills: 0 | Status: SHIPPED | OUTPATIENT
Start: 2025-05-09

## 2025-05-20 NOTE — PROGRESS NOTES
"Chief Complaint  Chief Complaint   Patient presents with    ADHD     3 month follow up on ADHD     Weight Check        Subjective          Andrea Dumont is a 50-year-old male who reports to the office today for follow-up on obesity and ADD.    ADD- He is compliant on medication. He denies any issues and reports the medication is controlling this. Denies SI or HI.       Obesity- He has been exercising 3 times a week for 20-30 minutes. He is eating healthy. He reports he feels like the Wegovy injections are helping. He denies any issues.      LLQ abdominal pain/ testicle pain- He reports he notices this with lifting and squating. He reports this started a long time ago. He has a history of testicular torsion. He reports with intercourse he had an extreme pressure. He reports the pain is more in his pelvis area. He reports he feels something on his left testicle that is different.                         Review of Systems   Constitutional:  Negative for chills and fever.   HENT:  Negative for congestion.    Respiratory:  Negative for shortness of breath.    Cardiovascular:  Negative for chest pain.   Gastrointestinal:  Positive for abdominal pain. Negative for nausea and vomiting.   Genitourinary:  Positive for testicular pain. Negative for difficulty urinating.   Musculoskeletal:  Negative for myalgias.   Skin: Negative.    Neurological:  Negative for dizziness, light-headedness and headache.        Objective   Vital Signs:   Vitals:    05/23/25 0858   BP: 131/74   Pulse: 84   Resp: 12   Temp: 97.8 °F (36.6 °C)   SpO2: 98%      Estimated body mass index is 32.38 kg/m² as calculated from the following:    Height as of this encounter: 180.3 cm (70.98\").    Weight as of this encounter: 105 kg (232 lb).                          Physical Exam  Vitals reviewed.   Constitutional:       Appearance: Normal appearance. He is obese.   HENT:      Head: Normocephalic and atraumatic.      Nose: Nose normal.      Mouth/Throat: "      Mouth: Mucous membranes are moist.      Pharynx: Oropharynx is clear.   Eyes:      Extraocular Movements: Extraocular movements intact.      Conjunctiva/sclera: Conjunctivae normal.   Cardiovascular:      Rate and Rhythm: Normal rate and regular rhythm.      Pulses: Normal pulses.      Heart sounds: Normal heart sounds.      Comments: S1, S2 audible  Pulmonary:      Effort: Pulmonary effort is normal.      Breath sounds: Normal breath sounds.      Comments: On room air   Abdominal:      General: Abdomen is flat.   Musculoskeletal:         General: Normal range of motion.      Cervical back: Normal range of motion.   Skin:     General: Skin is warm and dry.   Neurological:      General: No focal deficit present.      Mental Status: He is alert and oriented to person, place, and time. Mental status is at baseline.   Psychiatric:         Mood and Affect: Mood normal.         Behavior: Behavior normal.         Thought Content: Thought content normal.         Judgment: Judgment normal.                Physical Exam   Result Review :             Procedures       Assessment and Plan     Diagnoses and all orders for this visit:    1. Attention deficit hyperactivity disorder (ADHD), unspecified ADHD type (Primary)  Assessment & Plan:    ADD- He is compliant on medication. He denies any issues and reports the medication is controlling this. Denies SI or HI.     On adderall   Controlled substance agreement for 2025 signed   Check UDS next visit       2. Obesity (BMI 30-39.9)  Assessment & Plan:  Patient's (Body mass index is 32.38 kg/m².)       Obesity- He has been exercising 3 times a week for 20-30 minutes. He is eating healthy. He reports he feels like the Wegovy injections are helping. He denies any issues. He is down 15 pounds from the start.     Increased Wegovy to 0.5mg weekly   Encouraged nutritional counseling, calorie restriction, exercise, behavior modification  Goal weight: 200  Current weight: 232      Estimated end date of therapy:  pending therapy         3. Pain in left testicle  Assessment & Plan:  LLQ abdominal pain- He reports he notices this with lifting and squating. He reports this started a long time ago. He has a history of testicular torsion. He reports with intercourse he had an extreme pressure. He reports the pain is more in his pelvis area. He reports he feels something on his left testicle that is different.     US testicle ordered     Orders:  -     US Scrotum & Testicles; Future    4. Colon cancer screening  -     Cologuard - Stool, Per Rectum; Future    Other orders  -     Semaglutide-Weight Management (Wegovy) 0.5 MG/0.5ML solution auto-injector; Inject 0.5 mL under the skin into the appropriate area as directed 1 (One) Time Per Week.  Dispense: 2 mL; Refill: 2              Follow Up   Return in about 3 months (around 8/23/2025) for weight, ADHD, Recheck.   Patient was given instructions and counseling regarding her condition or for health maintenance advice. Please see specific information pulled into the AVS if appropriate.

## 2025-05-23 ENCOUNTER — OFFICE VISIT (OUTPATIENT)
Dept: FAMILY MEDICINE CLINIC | Facility: CLINIC | Age: 50
End: 2025-05-23
Payer: COMMERCIAL

## 2025-05-23 VITALS
HEART RATE: 84 BPM | DIASTOLIC BLOOD PRESSURE: 74 MMHG | SYSTOLIC BLOOD PRESSURE: 131 MMHG | HEIGHT: 71 IN | BODY MASS INDEX: 32.48 KG/M2 | RESPIRATION RATE: 12 BRPM | TEMPERATURE: 97.8 F | OXYGEN SATURATION: 98 % | WEIGHT: 232 LBS

## 2025-05-23 DIAGNOSIS — N50.812 PAIN IN LEFT TESTICLE: ICD-10-CM

## 2025-05-23 DIAGNOSIS — Z12.11 COLON CANCER SCREENING: ICD-10-CM

## 2025-05-23 DIAGNOSIS — E66.9 OBESITY (BMI 30-39.9): ICD-10-CM

## 2025-05-23 DIAGNOSIS — F90.9 ATTENTION DEFICIT HYPERACTIVITY DISORDER (ADHD), UNSPECIFIED ADHD TYPE: Primary | ICD-10-CM

## 2025-05-23 RX ORDER — SEMAGLUTIDE 0.5 MG/.5ML
0.5 INJECTION, SOLUTION SUBCUTANEOUS WEEKLY
Qty: 2 ML | Refills: 2 | Status: SHIPPED | OUTPATIENT
Start: 2025-05-23

## 2025-05-23 NOTE — ASSESSMENT & PLAN NOTE
LLQ abdominal pain- He reports he notices this with lifting and squating. He reports this started a long time ago. He has a history of testicular torsion. He reports with intercourse he had an extreme pressure. He reports the pain is more in his pelvis area. He reports he feels something on his left testicle that is different.     US testicle ordered

## 2025-05-23 NOTE — ASSESSMENT & PLAN NOTE
Patient's (Body mass index is 32.38 kg/m².)       Obesity- He has been exercising 3 times a week for 20-30 minutes. He is eating healthy. He reports he feels like the Wegovy injections are helping. He denies any issues. He is down 15 pounds from the start.     Increased Wegovy to 0.5mg weekly   Encouraged nutritional counseling, calorie restriction, exercise, behavior modification  Goal weight: 200  Current weight: 232     Estimated end date of therapy:  pending therapy

## 2025-05-23 NOTE — ASSESSMENT & PLAN NOTE
ADD- He is compliant on medication. He denies any issues and reports the medication is controlling this. Denies SI or HI.     On adderall   Controlled substance agreement for 2025 signed   Check UDS next visit

## 2025-06-02 RX ORDER — LISINOPRIL AND HYDROCHLOROTHIAZIDE 12.5; 2 MG/1; MG/1
1 TABLET ORAL DAILY
Qty: 90 TABLET | Refills: 0 | Status: SHIPPED | OUTPATIENT
Start: 2025-06-02

## 2025-06-12 ENCOUNTER — TELEPHONE (OUTPATIENT)
Dept: FAMILY MEDICINE CLINIC | Facility: CLINIC | Age: 50
End: 2025-06-12
Payer: COMMERCIAL

## 2025-06-15 DIAGNOSIS — F90.9 ATTENTION DEFICIT HYPERACTIVITY DISORDER (ADHD), UNSPECIFIED ADHD TYPE: ICD-10-CM

## 2025-06-16 RX ORDER — DEXTROAMPHETAMINE SACCHARATE, AMPHETAMINE ASPARTATE, DEXTROAMPHETAMINE SULFATE AND AMPHETAMINE SULFATE 2.5; 2.5; 2.5; 2.5 MG/1; MG/1; MG/1; MG/1
1 TABLET ORAL DAILY
Qty: 30 TABLET | Refills: 0 | Status: SHIPPED | OUTPATIENT
Start: 2025-06-16 | End: 2025-06-19 | Stop reason: SDUPTHER

## 2025-06-16 NOTE — TELEPHONE ENCOUNTER
[facility]    Rx Refill Note  Requested Prescriptions     Pending Prescriptions Disp Refills    amphetamine-dextroamphetamine (ADDERALL) 10 MG tablet [Pharmacy Med Name: Amphetamine-Dextroamphetamine Oral Tablet 10 MG] 30 tablet 0     Sig: TAKE 1 TABLET BY MOUTH EVERY DAY      Last office visit with prescribing clinician: 5/23/2025   Last telemedicine visit with prescribing clinician: Visit date not found   Next office visit with prescribing clinician: 8/22/2025                         Would you like a call back once the refill request has been completed: [] Yes [] No    If the office needs to give you a call back, can they leave a voicemail: [] Yes [] No    Mague Myles, RT  06/16/25, 09:36 EDT

## 2025-06-18 DIAGNOSIS — F90.9 ATTENTION DEFICIT HYPERACTIVITY DISORDER (ADHD), UNSPECIFIED ADHD TYPE: ICD-10-CM

## 2025-06-18 RX ORDER — DEXTROAMPHETAMINE SACCHARATE, AMPHETAMINE ASPARTATE, DEXTROAMPHETAMINE SULFATE AND AMPHETAMINE SULFATE 2.5; 2.5; 2.5; 2.5 MG/1; MG/1; MG/1; MG/1
1 TABLET ORAL DAILY
Qty: 30 TABLET | Refills: 0 | OUTPATIENT
Start: 2025-06-18

## 2025-06-18 NOTE — TELEPHONE ENCOUNTER
Caller: Andrea Dumont    Relationship: Self    Best call back number:     [email or web address] (Self) [phone] (Mobile)     What was the call regarding: PATIENT JUST SPOKE WITH THE PHARMACY / AND THEY HAVE NOT RECEIVED THIS RX YET     CAN YOU CALL THEM OR REFAX?         Is it okay if the provider responds through MyChart: CALL AND ADVISE

## 2025-06-19 ENCOUNTER — OFFICE VISIT (OUTPATIENT)
Dept: PAIN MEDICINE | Facility: CLINIC | Age: 50
End: 2025-06-19
Payer: COMMERCIAL

## 2025-06-19 VITALS
HEART RATE: 72 BPM | RESPIRATION RATE: 16 BRPM | BODY MASS INDEX: 32.1 KG/M2 | OXYGEN SATURATION: 98 % | WEIGHT: 230 LBS | SYSTOLIC BLOOD PRESSURE: 121 MMHG | DIASTOLIC BLOOD PRESSURE: 78 MMHG

## 2025-06-19 DIAGNOSIS — G35 MULTIPLE SCLEROSIS: ICD-10-CM

## 2025-06-19 DIAGNOSIS — Z79.899 HIGH RISK MEDICATION USE: ICD-10-CM

## 2025-06-19 DIAGNOSIS — M79.2 NEUROPATHIC PAIN: ICD-10-CM

## 2025-06-19 DIAGNOSIS — Z79.899 HIGH RISK MEDICATION USE: Primary | ICD-10-CM

## 2025-06-19 DIAGNOSIS — M79.18 MYOFASCIAL PAIN SYNDROME: Primary | ICD-10-CM

## 2025-06-19 DIAGNOSIS — F90.9 ATTENTION DEFICIT HYPERACTIVITY DISORDER (ADHD), UNSPECIFIED ADHD TYPE: ICD-10-CM

## 2025-06-19 RX ORDER — HYDROCODONE BITARTRATE AND ACETAMINOPHEN 7.5; 325 MG/1; MG/1
1 TABLET ORAL 4 TIMES DAILY PRN
Qty: 120 TABLET | Refills: 0 | Status: SHIPPED | OUTPATIENT
Start: 2025-06-19

## 2025-06-19 RX ORDER — DEXTROAMPHETAMINE SACCHARATE, AMPHETAMINE ASPARTATE, DEXTROAMPHETAMINE SULFATE AND AMPHETAMINE SULFATE 2.5; 2.5; 2.5; 2.5 MG/1; MG/1; MG/1; MG/1
1 TABLET ORAL DAILY
Qty: 30 TABLET | Refills: 0 | Status: SHIPPED | OUTPATIENT
Start: 2025-06-19

## 2025-06-19 RX ORDER — HYDROCODONE BITARTRATE AND ACETAMINOPHEN 7.5; 325 MG/1; MG/1
1 TABLET ORAL 4 TIMES DAILY PRN
Qty: 120 TABLET | Refills: 0 | Status: SHIPPED | OUTPATIENT
Start: 2025-07-19

## 2025-06-19 RX ORDER — HYDROCODONE BITARTRATE AND ACETAMINOPHEN 7.5; 325 MG/1; MG/1
1 TABLET ORAL 4 TIMES DAILY PRN
Qty: 120 TABLET | Refills: 0 | Status: SHIPPED | OUTPATIENT
Start: 2025-08-18

## 2025-06-19 NOTE — TELEPHONE ENCOUNTER
Patient called and pharmacy still does not have rx.    Looked at med hx.  Msg states e-prescribe failed.    Called pharmacy and they cannot take a verbal.  Rx needs resent.    amphetamine-dextroamphetamine (ADDERALL) 10 MG tablet        Sig: TAKE 1 TABLET BY MOUTH EVERY DAY        Sent to pharmacy as: Amphetamine-Dextroamphetamine 10 MG Oral Tablet (ADDERALL)        Class: Normal        Earliest Fill Date: 6/16/2025        Route: Oral        E-Prescribing Status: Transmission to pharmacy failed (6/16/2025 10:25 AM EDT)

## 2025-06-19 NOTE — PROGRESS NOTES
Subjective    CC bilateral feet and leg pain.  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  Denies any new complaints or issues today.  Continues to have good relief of functional benefit with hydrocodone without side effects.  Chronic bilateral feet and lower leg neuropathic pain and myofascial pain constant but worse with activity.  Denies new weakness saddle anesthesia bladder bowel continence.  Chronic axial back pain without radicular pain.  Chronic polyarthralgia.  EMG/NCS bilateral lower extremity within normal limit.  Sees neurology for MS  Pain interfering with daily activity work and sleep.  Tried gabapentin did not tolerate.  Currently utilizes Lyrica but can only tolerate once a day 75 mg.  Prescribed by PCP.  Cannot take NSAIDs due to polycystic kidney disease    C-spine MRI  No abnormal cervical spinal cord signal or enhancement is seen. Mild degenerative changes at C3-C4 causing stable mild to moderate bilateral neural foraminal narrowing at this level. No significant spinal canal stenosis.  L-spine and T-spine MRI 2022 Probable old demyelinating plaque in the posterior thoracic cord at the T8 level with no abnormal cervical or thoracic cord enhancement to suggest active demyelination. No new demyelinating plaque is identified. At C3-C4, left greater than right uncinate process hypertrophy results in mild to moderate left neural foraminal narrowing.  Otherwise, no significant spinal canal stenosis or neural foraminal narrowing.    Pain Assessment   Location of Pain:  ana Leg, joint, back  Description of Pain: Dull/Aching, Throbbing, Stabbing  Previous Pain Rating :5  Current Pain Ratin  Aggravating Factors: Activity  Alleviating Factors: Rest, Medication    PEG Assessment   What number best describes your pain on average in the past week?3  What number best describes how, during the past week, pain has interfered  with your enjoyment of life?2  What number best describes how, during the past week, pain has interfered with your general activity? 7    The following portions of the patient's history were reviewed and updated as appropriate: allergies, current medications, past family history, past medical history, past social history, past surgical history and problem list.    Review of Systems   Musculoskeletal:  Positive for arthralgias, back pain and myalgias.   All other systems reviewed and are negative.    Objective   Physical Exam   Constitutional: No distress.   Pulmonary/Chest: Effort normal.   Neurological: A sensory deficit is present.   Vitals reviewed.    PHQ 9 on chart  Opioid risk tool moderate risk (age)    Assessment & Plan    Diagnoses and all orders for this visit:    1. Myofascial pain syndrome (Primary)    2. Multiple sclerosis  -     HYDROcodone-acetaminophen (NORCO) 7.5-325 MG per tablet; Take 1 tablet by mouth 4 (Four) Times a Day As Needed for Severe Pain.  Dispense: 120 tablet; Refill: 0  -     HYDROcodone-acetaminophen (NORCO) 7.5-325 MG per tablet; Take 1 tablet by mouth 4 (Four) Times a Day As Needed for Severe Pain. DNF before 7/19/2025  Dispense: 120 tablet; Refill: 0  -     HYDROcodone-acetaminophen (NORCO) 7.5-325 MG per tablet; Take 1 tablet by mouth 4 (Four) Times a Day As Needed for Severe Pain. DNF before 8/18/2025  Dispense: 120 tablet; Refill: 0    3. Neuropathic pain  -     HYDROcodone-acetaminophen (NORCO) 7.5-325 MG per tablet; Take 1 tablet by mouth 4 (Four) Times a Day As Needed for Severe Pain.  Dispense: 120 tablet; Refill: 0  -     HYDROcodone-acetaminophen (NORCO) 7.5-325 MG per tablet; Take 1 tablet by mouth 4 (Four) Times a Day As Needed for Severe Pain. DNF before 7/19/2025  Dispense: 120 tablet; Refill: 0  -     HYDROcodone-acetaminophen (NORCO) 7.5-325 MG per tablet; Take 1 tablet by mouth 4 (Four) Times a Day As Needed for Severe Pain. DNF before 8/18/2025  Dispense: 120  tablet; Refill: 0    4. High risk medication use    Summary  Andrea Dumont is a 50 y.o. male with multiple sclerosis, peripheral neuropathy here for f/u.   Chronic bilateral lower extremity neuropathic pain/myofascial pain related to MS.   Cannot take NSAIDs due to polycystic kidney disease.  EMG/NCS bilateral lower extremity within normal limit.  Keeps follow up with neurology.    Continue hydrocodone 7.5/325 4 daily as needed for severe pain.  UDS and Inspect reviewed.   Discussed risk of tolerance, dependence, respiratory depression, coma and death associated with use of oral opioids for treatment of chronic nonmalignant pain.     RTC 2-3  month

## 2025-06-27 RX ORDER — BUPROPION HYDROCHLORIDE 300 MG/1
300 TABLET ORAL DAILY
Qty: 90 TABLET | Refills: 0 | Status: SHIPPED | OUTPATIENT
Start: 2025-06-27

## 2025-07-13 DIAGNOSIS — F90.9 ATTENTION DEFICIT HYPERACTIVITY DISORDER (ADHD), UNSPECIFIED ADHD TYPE: ICD-10-CM

## 2025-07-14 RX ORDER — TADALAFIL 10 MG/1
10 TABLET ORAL DAILY
Qty: 30 TABLET | Refills: 2 | Status: SHIPPED | OUTPATIENT
Start: 2025-07-14

## 2025-07-14 RX ORDER — DEXTROAMPHETAMINE SACCHARATE, AMPHETAMINE ASPARTATE, DEXTROAMPHETAMINE SULFATE AND AMPHETAMINE SULFATE 2.5; 2.5; 2.5; 2.5 MG/1; MG/1; MG/1; MG/1
1 TABLET ORAL DAILY
Qty: 30 TABLET | Refills: 0 | Status: SHIPPED | OUTPATIENT
Start: 2025-07-14

## 2025-07-14 NOTE — TELEPHONE ENCOUNTER
[facility]    Rx Refill Note  Requested Prescriptions     Pending Prescriptions Disp Refills    amphetamine-dextroamphetamine (ADDERALL) 10 MG tablet [Pharmacy Med Name: Amphetamine-Dextroamphetamine Oral Tablet 10 MG] 30 tablet 0     Sig: Take 1 tablet by mouth Daily.      Last office visit with prescribing clinician: Visit date not found   Last telemedicine visit with prescribing clinician: Visit date not found   Next office visit with prescribing clinician: Visit date not found                         Would you like a call back once the refill request has been completed: [] Yes [] No    If the office needs to give you a call back, can they leave a voicemail: [] Yes [] No    Mague Myles, RT  07/14/25, 11:36 EDT

## 2025-08-17 DIAGNOSIS — F90.9 ATTENTION DEFICIT HYPERACTIVITY DISORDER (ADHD), UNSPECIFIED ADHD TYPE: ICD-10-CM

## 2025-08-18 RX ORDER — DEXTROAMPHETAMINE SACCHARATE, AMPHETAMINE ASPARTATE, DEXTROAMPHETAMINE SULFATE AND AMPHETAMINE SULFATE 2.5; 2.5; 2.5; 2.5 MG/1; MG/1; MG/1; MG/1
1 TABLET ORAL DAILY
Qty: 30 TABLET | Refills: 0 | Status: SHIPPED | OUTPATIENT
Start: 2025-08-18

## 2025-08-21 ENCOUNTER — OFFICE VISIT (OUTPATIENT)
Dept: PAIN MEDICINE | Facility: CLINIC | Age: 50
End: 2025-08-21
Payer: COMMERCIAL

## 2025-08-21 VITALS
SYSTOLIC BLOOD PRESSURE: 111 MMHG | DIASTOLIC BLOOD PRESSURE: 74 MMHG | OXYGEN SATURATION: 96 % | HEART RATE: 82 BPM | BODY MASS INDEX: 31.4 KG/M2 | RESPIRATION RATE: 16 BRPM | WEIGHT: 225 LBS

## 2025-08-21 DIAGNOSIS — G35 MULTIPLE SCLEROSIS: ICD-10-CM

## 2025-08-21 DIAGNOSIS — Z79.899 HIGH RISK MEDICATION USE: ICD-10-CM

## 2025-08-21 DIAGNOSIS — M79.18 MYOFASCIAL PAIN SYNDROME: ICD-10-CM

## 2025-08-21 DIAGNOSIS — M79.2 NEUROPATHIC PAIN: Primary | ICD-10-CM

## 2025-08-21 RX ORDER — HYDROCODONE BITARTRATE AND ACETAMINOPHEN 7.5; 325 MG/1; MG/1
1 TABLET ORAL 4 TIMES DAILY PRN
Qty: 120 TABLET | Refills: 0 | Status: SHIPPED | OUTPATIENT
Start: 2025-09-18

## 2025-08-21 RX ORDER — HYDROCODONE BITARTRATE AND ACETAMINOPHEN 7.5; 325 MG/1; MG/1
1 TABLET ORAL 4 TIMES DAILY PRN
Qty: 120 TABLET | Refills: 0 | Status: SHIPPED | OUTPATIENT
Start: 2025-10-18

## (undated) DEVICE — ABL ASP APOLLO RF XL 90D

## (undated) DEVICE — NITINOL WIRE WITH HYDROPHILIC TIP: Brand: SENSOR

## (undated) DEVICE — SOL IRRG H2O BG 3000ML STRL

## (undated) DEVICE — KT SURG TURNOVER 050

## (undated) DEVICE — TBG ARTHSCP PT W CONN/REDUC 8FT

## (undated) DEVICE — GOWN,SLEEVE,STERILE,W/CSR WRAP,1/P: Brand: MEDLINE

## (undated) DEVICE — PK SHLDR ARTHROSCOPY 50

## (undated) DEVICE — GLV SURG SENSICARE PI ORTHO SZ8 LF STRL

## (undated) DEVICE — PREP SPRY PVPI 10P 2OZ

## (undated) DEVICE — SUT VIC 2/0 SH 27IN

## (undated) DEVICE — GLV SURG SENSICARE PI ORTHO SZ7.5 LF STRL

## (undated) DEVICE — SYS IRR PUMP SGL ACTN VAC SYR 10CC

## (undated) DEVICE — GLV SURG SIGNATURE ESSENTIAL PF LTX SZ8.5

## (undated) DEVICE — GW FIX CORE JB FLX PTFE .035 15X145CM

## (undated) DEVICE — TUBING, SUCTION, 1/4" X 12', STRAIGHT: Brand: MEDLINE

## (undated) DEVICE — CANN TRPL DAM 7X7MM

## (undated) DEVICE — SLV SCD CALF HEMOFORCE DVT THERP REPROC MD

## (undated) DEVICE — UNDERGLV SURG BIOGEL INDICAT PI SZ8 BLU

## (undated) DEVICE — PK CYSTO 50

## (undated) DEVICE — PRT BIOP SEALS

## (undated) DEVICE — SOLUTION,WATER,IRRIGATION,1000ML,STERILE: Brand: MEDLINE

## (undated) DEVICE — TBG ARTHSCP PUMP W CONN/REDUC 8FT

## (undated) DEVICE — GLV SURG SIGNATURE ESSENTIAL PF LTX SZ8

## (undated) DEVICE — URETERAL ACCESS SHEATH SET: Brand: NAVIGATOR HD

## (undated) DEVICE — BLD CUT FORMLA AGGR ANGL 4MM

## (undated) DEVICE — UNDRGLV SURG BIOGEL PIMICROINDICATOR SYNTH SZ7.5PF STRL PR/2

## (undated) DEVICE — DUAL LUMEN URETERAL CATHETER

## (undated) DEVICE — SPNG GZ AVANT 6PLY 4X4IN STRL PK/2

## (undated) DEVICE — GLV SURG SIGNATURE ESSENTIAL PF LTX SZ6.5

## (undated) DEVICE — PAD,ABDOMINAL,5"X9",STERILE,LF,1/PK: Brand: MEDLINE INDUSTRIES, INC.

## (undated) DEVICE — CATH URETRL OPN/END 5F70CM

## (undated) DEVICE — GLV SURG SIGNATURE ESSENTIAL PF LTX SZ7

## (undated) DEVICE — TBG ARTHSCP DUALWAVE

## (undated) DEVICE — NEEDLE, QUINCKE, 18GX3.5": Brand: MEDLINE

## (undated) DEVICE — FIBR LASR HOLMIUM 200 MICRON DISP

## (undated) DEVICE — BUR RND COOL CUT 8FLUT 4MM 13CM

## (undated) DEVICE — BLD SHAVER EXCALIBUR CRV 4MM